# Patient Record
Sex: FEMALE | Race: WHITE | Employment: OTHER | URBAN - METROPOLITAN AREA
[De-identification: names, ages, dates, MRNs, and addresses within clinical notes are randomized per-mention and may not be internally consistent; named-entity substitution may affect disease eponyms.]

---

## 2017-01-03 ENCOUNTER — OFFICE VISIT (OUTPATIENT)
Dept: PODIATRY CLINIC | Facility: CLINIC | Age: 57
End: 2017-01-03

## 2017-01-03 DIAGNOSIS — S90.822A BLISTER OF FOOT, LEFT, INITIAL ENCOUNTER: Primary | ICD-10-CM

## 2017-01-03 PROCEDURE — 99212 OFFICE O/P EST SF 10 MIN: CPT | Performed by: PODIATRIST

## 2017-01-03 NOTE — PROGRESS NOTES
HPI:    Patient ID: Tiffany Mcknight is a 55year old female. HPI  This 28-year-old female presents for follow-up in reference to the blister on the right great toe. Patient states it is very good seems to be fine she is not aware of any draining.   Re the defined types were placed in this encounter.        Meds This Visit:  No prescriptions requested or ordered in this encounter    Imaging & Referrals:  None       XR#[id number]

## 2017-01-17 ENCOUNTER — OFFICE VISIT (OUTPATIENT)
Dept: PODIATRY CLINIC | Facility: CLINIC | Age: 57
End: 2017-01-17

## 2017-01-17 DIAGNOSIS — S90.822A BLISTER OF FOOT, LEFT, INITIAL ENCOUNTER: Primary | ICD-10-CM

## 2017-01-17 PROCEDURE — 99212 OFFICE O/P EST SF 10 MIN: CPT | Performed by: PODIATRIST

## 2017-01-17 RX ORDER — LANCETS
EACH MISCELLANEOUS
Refills: 0 | COMMUNITY
Start: 2016-12-27

## 2017-01-17 NOTE — PROGRESS NOTES
HPI:    Patient ID: Yaquelin Bolivar is a 55year old female. HPI  This 61-year-old female presents for follow-up in reference to the wound on the distal lateral great toe right.   Patient states that she has no more pain and she is not aware that it is

## 2017-01-30 ENCOUNTER — TELEPHONE (OUTPATIENT)
Dept: OPHTHALMOLOGY | Facility: CLINIC | Age: 57
End: 2017-01-30

## 2017-01-30 NOTE — TELEPHONE ENCOUNTER
Pt states her R eye is very red, states it looks bloodshot, requesting to speak to RN. Pls call thank you.

## 2017-01-30 NOTE — TELEPHONE ENCOUNTER
Spoke to pt and complains of a broken blood vessel in her right eye that she noticed today. Pt denies any blurred vision or eye pain. I explained that its a \"subconjunctival hemorrhage and will subside on its own.  I offered pt an office visit this week or

## 2017-03-27 RX ORDER — LISINOPRIL 5 MG/1
TABLET ORAL
Qty: 30 TABLET | Refills: 11 | Status: SHIPPED | OUTPATIENT
Start: 2017-03-27 | End: 2018-05-01

## 2017-04-06 ENCOUNTER — OFFICE VISIT (OUTPATIENT)
Dept: FAMILY MEDICINE CLINIC | Facility: CLINIC | Age: 57
End: 2017-04-06

## 2017-04-06 ENCOUNTER — TELEPHONE (OUTPATIENT)
Dept: FAMILY MEDICINE CLINIC | Facility: CLINIC | Age: 57
End: 2017-04-06

## 2017-04-06 VITALS
TEMPERATURE: 98 F | HEART RATE: 76 BPM | RESPIRATION RATE: 18 BRPM | SYSTOLIC BLOOD PRESSURE: 114 MMHG | DIASTOLIC BLOOD PRESSURE: 76 MMHG | HEIGHT: 67 IN | WEIGHT: 180 LBS | BODY MASS INDEX: 28.25 KG/M2

## 2017-04-06 DIAGNOSIS — R42 VERTIGO: ICD-10-CM

## 2017-04-06 PROCEDURE — 99212 OFFICE O/P EST SF 10 MIN: CPT | Performed by: FAMILY MEDICINE

## 2017-04-06 PROCEDURE — 99213 OFFICE O/P EST LOW 20 MIN: CPT | Performed by: FAMILY MEDICINE

## 2017-04-06 RX ORDER — MECLIZINE HYDROCHLORIDE 25 MG/1
TABLET ORAL
Qty: 30 TABLET | Refills: 0 | Status: SHIPPED | OUTPATIENT
Start: 2017-04-06 | End: 2018-04-09

## 2017-04-06 NOTE — PROGRESS NOTES
HPI:    Patient ID: More Raymundo is a 55year old female. HPI Comments: Pt had an vertigo symptoms for about one month. Pt had a similar episode a year ago and was seen by Dr Delmar Ramsey and given medication and was fine until about one month ago.  No head Psychiatric: She has a normal mood and affect. Her behavior is normal.              ASSESSMENT/PLAN:   Vertigo:  -  After discussion, antivert as directed; Rest and slow movements encouraged.  To monitor symptoms and call if worse or other symptoms; Consi

## 2017-04-06 NOTE — TELEPHONE ENCOUNTER
Pt calling c/o vertigo, declined appt offered states needs something after 5 pm today. Please advise.

## 2017-04-06 NOTE — TELEPHONE ENCOUNTER
Actions Requested: Pt requesting appt. Situation/Background   Problem: Dizziness   Onset: One year ago, didn't f/u with pcp after finishing the  meclizine. Associated Symptoms: Wakes up middle of night turns head and dizziness worsens.  Walking dizziness

## 2017-04-08 ENCOUNTER — OFFICE VISIT (OUTPATIENT)
Dept: OPHTHALMOLOGY | Facility: CLINIC | Age: 57
End: 2017-04-08

## 2017-04-08 DIAGNOSIS — H40.1131 PRIMARY OPEN ANGLE GLAUCOMA OF BOTH EYES, MILD STAGE: Primary | ICD-10-CM

## 2017-04-08 PROCEDURE — 99212 OFFICE O/P EST SF 10 MIN: CPT | Performed by: OPHTHALMOLOGY

## 2017-04-08 PROCEDURE — 99213 OFFICE O/P EST LOW 20 MIN: CPT | Performed by: OPHTHALMOLOGY

## 2017-04-08 NOTE — ASSESSMENT & PLAN NOTE
Discussed with patient Intraocular pressure stable, tolerating medications. Will continue same regimen of Latanoprost in both eyes at bedtime. Will have patient back in 4 months for a visual field, optic nerve scan and pressure check.

## 2017-04-08 NOTE — PROGRESS NOTES
Elvin Crow is a 55year old female. HPI:     HPI     Patient is here for an IOP check. She is taking Latanoprost OU, qhs. Patient states no ocular complaints or changes.        Last edited by Jenniffer Elizondo O.T. on 4/8/2017  8:41 AM.     Mercedes Bedolla Zi/ OREN); Yag Capsulotomy - OS - Left Eye (Left, 1/6/16) (OREN); and Yag Capsulotomy - OD - Right Eye (Right, 6/22/2016) (Dr. Larissa Oleary).     Family History   Problem Relation Age of Onset   • Cancer Father      NG: Esophageal   • Heart Disease Mother no K spindles    Anterior Chamber Deep and quiet Deep and quiet    Iris No transillumination defects No transillumination defects    Lens PC IOL with YAG PC IOL with YAG      Fundus Exam      Right Left    Disc Good rim,  Peripapillary atrophy Good rim,  P

## 2017-04-08 NOTE — PATIENT INSTRUCTIONS
Primary open angle glaucoma of both eyes  Discussed with patient Intraocular pressure stable, tolerating medications. Will continue same regimen of Latanoprost in both eyes at bedtime.   Will have patient back in 4 months for a visual field, optic nerve sc

## 2017-04-20 ENCOUNTER — OFFICE VISIT (OUTPATIENT)
Dept: OTOLARYNGOLOGY | Facility: CLINIC | Age: 57
End: 2017-04-20

## 2017-04-20 ENCOUNTER — OFFICE VISIT (OUTPATIENT)
Dept: AUDIOLOGY | Facility: CLINIC | Age: 57
End: 2017-04-20

## 2017-04-20 VITALS — WEIGHT: 180 LBS | HEIGHT: 66 IN | TEMPERATURE: 99 F | BODY MASS INDEX: 28.93 KG/M2

## 2017-04-20 DIAGNOSIS — R42 DIZZINESS: Primary | ICD-10-CM

## 2017-04-20 DIAGNOSIS — H90.3 SENSORINEURAL HEARING LOSS, BILATERAL: Primary | ICD-10-CM

## 2017-04-20 PROCEDURE — 99212 OFFICE O/P EST SF 10 MIN: CPT | Performed by: OTOLARYNGOLOGY

## 2017-04-20 PROCEDURE — 99243 OFF/OP CNSLTJ NEW/EST LOW 30: CPT | Performed by: OTOLARYNGOLOGY

## 2017-04-20 PROCEDURE — 92550 TYMPANOMETRY & REFLEX THRESH: CPT | Performed by: AUDIOLOGIST

## 2017-04-21 NOTE — PROGRESS NOTES
AUDIOLOGY REPORT      Luis Carson is a 55year old female     Referring Provider: Jerry Calix   YOB: 1960  Medical Record: [id number]      Patient Hearing History:  Patient reported dizziness.      Otoscopic Inspection:  Right ear:

## 2017-04-21 NOTE — PROGRESS NOTES
Hussein Zavala is a 55year old female. Patient presents with:  Dizziness: Patient c/o dizziness has taken medication for it but never resolved      HISTORY OF PRESENT ILLNESS  She presents with a one-year history of dizziness.  This started with sudden presbyopia 2008     NG:  OU   • Episcleritis 12/14/2009     NG:  OD   • Iritis 12/14/2009     NG: OD   • Ganglion 10/22/2013     NG: Right thumb   • Type II or unspecified type diabetes mellitus without mention of complication, not stated as uncontrolled intolerance and heat intolerance. Neuro Negative Tremors. Psych Negative Anxiety and depression. Integumentary Negative Frequent skin infections, pigment change and rash. Hema/Lymph Negative Easy bleeding and easy bruising.            PHYSICAL EXAM Disp: , Rfl: 0  •  latanoprost 0.005 % Ophthalmic Solution, Instill 1 drop by ophthalmic route every night into both eyes, Disp: 1 Bottle, Rfl: 11  •  MetFORMIN HCl  MG Oral Tablet 24 Hr, Take 2 tablets (1,000 mg total) by mouth 2 (two) times daily w

## 2017-05-08 NOTE — TELEPHONE ENCOUNTER
Pt is calling to follow up on refill request, states she has been out for a few days already. Pt was informed LOV: 9/2016, states she will get labs done on Saturday, please advise on refill.

## 2017-05-09 RX ORDER — METFORMIN HYDROCHLORIDE 500 MG/1
TABLET, EXTENDED RELEASE ORAL
Qty: 120 TABLET | Refills: 0 | Status: SHIPPED | OUTPATIENT
Start: 2017-05-09 | End: 2017-06-16

## 2017-05-09 NOTE — TELEPHONE ENCOUNTER
MJS there is a pending order for a1c, do you want to check liver function tests again, AST was 47 in September

## 2017-05-09 NOTE — TELEPHONE ENCOUNTER
[facility] sent, LM on patient's dedicated VM, get fasting labs done this week, make office appt with MJS within 2 weeks

## 2017-05-13 ENCOUNTER — APPOINTMENT (OUTPATIENT)
Dept: LAB | Facility: HOSPITAL | Age: 57
End: 2017-05-13
Attending: FAMILY MEDICINE
Payer: COMMERCIAL

## 2017-05-13 PROCEDURE — 83036 HEMOGLOBIN GLYCOSYLATED A1C: CPT | Performed by: FAMILY MEDICINE

## 2017-05-25 ENCOUNTER — OFFICE VISIT (OUTPATIENT)
Dept: FAMILY MEDICINE CLINIC | Facility: CLINIC | Age: 57
End: 2017-05-25

## 2017-05-25 VITALS
BODY MASS INDEX: 29 KG/M2 | WEIGHT: 177 LBS | SYSTOLIC BLOOD PRESSURE: 144 MMHG | TEMPERATURE: 98 F | DIASTOLIC BLOOD PRESSURE: 78 MMHG | RESPIRATION RATE: 20 BRPM | HEART RATE: 90 BPM

## 2017-05-25 DIAGNOSIS — J06.9 ACUTE URI: Primary | ICD-10-CM

## 2017-05-25 PROCEDURE — 99213 OFFICE O/P EST LOW 20 MIN: CPT | Performed by: FAMILY MEDICINE

## 2017-05-25 PROCEDURE — 99212 OFFICE O/P EST SF 10 MIN: CPT | Performed by: FAMILY MEDICINE

## 2017-05-25 RX ORDER — AZITHROMYCIN 250 MG/1
TABLET, FILM COATED ORAL
Qty: 6 TABLET | Refills: 0 | Status: SHIPPED | OUTPATIENT
Start: 2017-05-25 | End: 2017-07-28 | Stop reason: ALTCHOICE

## 2017-05-25 RX ORDER — CODEINE PHOSPHATE AND GUAIFENESIN 10; 100 MG/5ML; MG/5ML
5 SOLUTION ORAL EVERY 6 HOURS PRN
Qty: 140 ML | Refills: 0 | Status: SHIPPED | OUTPATIENT
Start: 2017-05-25 | End: 2017-07-28 | Stop reason: ALTCHOICE

## 2017-05-25 NOTE — PROGRESS NOTES
HPI:    Patient ID: Hussein Zavala is a 55year old female. HPI Comments: Pt presents with cold symptoms for 4-5 days. Pt has had cough, sore throat. No fevers. Pt has tried otc remedies without relief.  Pt states sick contacts at work      Cough  Thi 0   Glucose Blood (ACCU-CHEK RADHA) In Vitro Strip place 1 by Intradermal route  every day Disp:  Rfl:      Allergies:No Known Allergies   PHYSICAL EXAM:   Physical Exam   Constitutional: She appears well-developed and well-nourished.    HENT:   Right Ear:

## 2017-05-30 ENCOUNTER — OFFICE VISIT (OUTPATIENT)
Dept: FAMILY MEDICINE CLINIC | Facility: CLINIC | Age: 57
End: 2017-05-30

## 2017-05-30 VITALS
BODY MASS INDEX: 28.13 KG/M2 | HEART RATE: 73 BPM | WEIGHT: 175 LBS | TEMPERATURE: 98 F | DIASTOLIC BLOOD PRESSURE: 76 MMHG | SYSTOLIC BLOOD PRESSURE: 127 MMHG | HEIGHT: 66 IN

## 2017-05-30 DIAGNOSIS — J06.9 ACUTE URI: ICD-10-CM

## 2017-05-30 PROCEDURE — 99213 OFFICE O/P EST LOW 20 MIN: CPT | Performed by: FAMILY MEDICINE

## 2017-05-30 PROCEDURE — 99212 OFFICE O/P EST SF 10 MIN: CPT | Performed by: FAMILY MEDICINE

## 2017-05-30 RX ORDER — ALBUTEROL SULFATE 90 UG/1
2 AEROSOL, METERED RESPIRATORY (INHALATION) EVERY 4 HOURS PRN
Qty: 1 INHALER | Refills: 0 | Status: SHIPPED | OUTPATIENT
Start: 2017-05-30 | End: 2018-03-19

## 2017-05-30 NOTE — PROGRESS NOTES
HPI:    Patient ID: Magui Nick is a 55year old female. HPI Comments: Pt presents for follow up for an URI. Pt has had cough, sore throat but now feels better. Pt has had some occasional cough and chest tightness. NO sig wheezing or SOB.  No fever Blood (ACCU-CHEK RADHA) In Vitro Strip place 1 by Intradermal route  every day Disp:  Rfl:      Allergies:No Known Allergies   PHYSICAL EXAM:   Physical Exam   Constitutional: She appears well-developed and well-nourished.    HENT:   Right Ear: Tympanic mem

## 2017-06-19 RX ORDER — METFORMIN HYDROCHLORIDE 500 MG/1
TABLET, EXTENDED RELEASE ORAL
Qty: 120 TABLET | Refills: 0 | Status: SHIPPED | OUTPATIENT
Start: 2017-06-19 | End: 2017-07-21

## 2017-06-19 NOTE — TELEPHONE ENCOUNTER
Pt contacted and she states she will call back to book an appt. Pt was advised that she was only refilled for 30 days, so she will need to be seen within the next 30 days for additional refills, pt verbalized understanding.

## 2017-06-28 ENCOUNTER — PATIENT OUTREACH (OUTPATIENT)
Dept: FAMILY MEDICINE CLINIC | Facility: CLINIC | Age: 57
End: 2017-06-28

## 2017-07-21 ENCOUNTER — PATIENT OUTREACH (OUTPATIENT)
Dept: FAMILY MEDICINE CLINIC | Facility: CLINIC | Age: 57
End: 2017-07-21

## 2017-07-21 DIAGNOSIS — IMO0001 UNCONTROLLED TYPE 2 DIABETES MELLITUS WITHOUT COMPLICATION, WITHOUT LONG-TERM CURRENT USE OF INSULIN: Primary | ICD-10-CM

## 2017-07-21 RX ORDER — METFORMIN HYDROCHLORIDE 500 MG/1
TABLET, EXTENDED RELEASE ORAL
Qty: 120 TABLET | Refills: 0 | Status: CANCELLED | OUTPATIENT
Start: 2017-07-21

## 2017-07-21 RX ORDER — METFORMIN HYDROCHLORIDE 500 MG/1
TABLET, EXTENDED RELEASE ORAL
Qty: 120 TABLET | Refills: 0 | Status: SHIPPED | OUTPATIENT
Start: 2017-07-21 | End: 2017-08-14

## 2017-07-21 NOTE — TELEPHONE ENCOUNTER
Ok to refill? Pt made a follow up for 7/28/17. Failed protocol due to elevated A1C.   Diabetes Medications  Protocol Criteria:  · Appointment scheduled in the past 6 months or the next 3 months  · A1C < 7.5 in the past 6 months  · Creatinine in the past 12

## 2017-07-21 NOTE — TELEPHONE ENCOUNTER
Current Outpatient Prescriptions:  METFORMIN HCL  MG Oral Tablet 24 Hr TAKE TWO TABLETS BY MOUTH TWICE DAILY WITH MEALS Disp: 120 tablet Rfl: 0     Pt made 7/28 appt with DEBBIE-will be out before appt

## 2017-07-21 NOTE — PROGRESS NOTES
Informed pt I would put the lab orders in for  Upcoming visit and she stated understanding she would be fasting. She then said she needs more metformin as she will be out by Monday of this next week and her appt isn't until next Friday the 28th.  She said s

## 2017-07-23 ENCOUNTER — APPOINTMENT (OUTPATIENT)
Dept: LAB | Facility: HOSPITAL | Age: 57
End: 2017-07-23
Attending: FAMILY MEDICINE
Payer: COMMERCIAL

## 2017-07-23 DIAGNOSIS — IMO0001 UNCONTROLLED TYPE 2 DIABETES MELLITUS WITHOUT COMPLICATION, WITHOUT LONG-TERM CURRENT USE OF INSULIN: ICD-10-CM

## 2017-07-23 LAB
ALBUMIN SERPL BCP-MCNC: 3.9 G/DL (ref 3.5–4.8)
ALBUMIN/GLOB SERPL: 1.4 {RATIO} (ref 1–2)
ALP SERPL-CCNC: 96 U/L (ref 32–100)
ALT SERPL-CCNC: 82 U/L (ref 14–54)
ANION GAP SERPL CALC-SCNC: 8 MMOL/L (ref 0–18)
AST SERPL-CCNC: 53 U/L (ref 15–41)
BILIRUB SERPL-MCNC: 0.7 MG/DL (ref 0.3–1.2)
BUN SERPL-MCNC: 19 MG/DL (ref 8–20)
BUN/CREAT SERPL: 32.2 (ref 10–20)
CALCIUM SERPL-MCNC: 9.1 MG/DL (ref 8.5–10.5)
CHLORIDE SERPL-SCNC: 102 MMOL/L (ref 95–110)
CHOLEST SERPL-MCNC: 143 MG/DL (ref 110–200)
CO2 SERPL-SCNC: 27 MMOL/L (ref 22–32)
CREAT SERPL-MCNC: 0.59 MG/DL (ref 0.5–1.5)
CREAT UR-MCNC: 144.3 MG/DL
GLOBULIN PLAS-MCNC: 2.8 G/DL (ref 2.5–3.7)
GLUCOSE SERPL-MCNC: 287 MG/DL (ref 70–99)
HBA1C MFR BLD: 12.3 % (ref 4–6)
HDLC SERPL-MCNC: 42 MG/DL
LDLC SERPL CALC-MCNC: 83 MG/DL (ref 0–99)
MICROALBUMIN UR-MCNC: 1.1 MG/DL (ref 0–1.8)
MICROALBUMIN/CREAT UR: 7.6 MG/G{CREAT} (ref 0–20)
NONHDLC SERPL-MCNC: 101 MG/DL
OSMOLALITY UR CALC.SUM OF ELEC: 297 MOSM/KG (ref 275–295)
POTASSIUM SERPL-SCNC: 3.9 MMOL/L (ref 3.3–5.1)
PROT SERPL-MCNC: 6.7 G/DL (ref 5.9–8.4)
SODIUM SERPL-SCNC: 137 MMOL/L (ref 136–144)
TRIGL SERPL-MCNC: 92 MG/DL (ref 1–149)
TSH SERPL-ACNC: 1.69 UIU/ML (ref 0.45–5.33)

## 2017-07-23 PROCEDURE — 36415 COLL VENOUS BLD VENIPUNCTURE: CPT

## 2017-07-23 PROCEDURE — 84443 ASSAY THYROID STIM HORMONE: CPT

## 2017-07-23 PROCEDURE — 82043 UR ALBUMIN QUANTITATIVE: CPT

## 2017-07-23 PROCEDURE — 80053 COMPREHEN METABOLIC PANEL: CPT

## 2017-07-23 PROCEDURE — 80061 LIPID PANEL: CPT

## 2017-07-23 PROCEDURE — 82570 ASSAY OF URINE CREATININE: CPT

## 2017-07-23 PROCEDURE — 83036 HEMOGLOBIN GLYCOSYLATED A1C: CPT

## 2017-07-28 ENCOUNTER — OFFICE VISIT (OUTPATIENT)
Dept: FAMILY MEDICINE CLINIC | Facility: CLINIC | Age: 57
End: 2017-07-28

## 2017-07-28 VITALS
HEART RATE: 69 BPM | BODY MASS INDEX: 28 KG/M2 | WEIGHT: 172 LBS | SYSTOLIC BLOOD PRESSURE: 114 MMHG | DIASTOLIC BLOOD PRESSURE: 68 MMHG

## 2017-07-28 DIAGNOSIS — IMO0001 UNCONTROLLED TYPE 2 DIABETES MELLITUS WITHOUT COMPLICATION, WITHOUT LONG-TERM CURRENT USE OF INSULIN: ICD-10-CM

## 2017-07-28 DIAGNOSIS — E11.41 TYPE 2 DIABETES MELLITUS WITH DIABETIC MONONEUROPATHY, WITHOUT LONG-TERM CURRENT USE OF INSULIN (HCC): Primary | ICD-10-CM

## 2017-07-28 PROCEDURE — 99212 OFFICE O/P EST SF 10 MIN: CPT | Performed by: FAMILY MEDICINE

## 2017-07-28 PROCEDURE — 99214 OFFICE O/P EST MOD 30 MIN: CPT | Performed by: FAMILY MEDICINE

## 2017-07-28 NOTE — PROGRESS NOTES
HPI:    Patient ID: Jakob Talamantes is a 55year old female. HPI  Patient presents with:  Diabetes: f/u medication  Labs done. Very elevated Hga1c    Review of Systems   Constitutional: Negative. Eyes: Negative. Respiratory: Negative.     Cardiov long-term current use of insulin (hcc)  (primary encounter diagnosis)    Long discussion regarding adding medication. She stopped taking pioglitasone that was added last visit. Will add tradjenta pending cost eval and recheck labs in three months.  Has eye

## 2017-07-29 ENCOUNTER — TELEPHONE (OUTPATIENT)
Dept: FAMILY MEDICINE CLINIC | Facility: CLINIC | Age: 57
End: 2017-07-29

## 2017-07-29 NOTE — TELEPHONE ENCOUNTER
Pt was prescribed Linagliptin (TRADJENTA) 5 MG Oral Tab and it is very expensive. Do we happen to have any coupons for this medication?

## 2017-07-31 NOTE — TELEPHONE ENCOUNTER
Dr. Junior Coffey stated that the medication is to expensive but, she will pick it up from the Pharmacy this time. Per pt will need a written for Tradjenta 5mg for 90 day supply per pt states she can get it cheap [place]

## 2017-08-07 NOTE — TELEPHONE ENCOUNTER
Wait until first month to tell if she tolerates med or not. I can also change med to alternative to see if lower expense med available.

## 2017-08-09 ENCOUNTER — TELEPHONE (OUTPATIENT)
Dept: FAMILY MEDICINE CLINIC | Facility: CLINIC | Age: 57
End: 2017-08-09

## 2017-08-09 NOTE — TELEPHONE ENCOUNTER
Pt calling regarding medication Linagliptin (TRADJENTA) 5 MG Oral Tab. Pt would like to know what time in the day should she take this medication. .. please advise

## 2017-08-15 RX ORDER — METFORMIN HYDROCHLORIDE 500 MG/1
TABLET, EXTENDED RELEASE ORAL
Qty: 120 TABLET | Refills: 0 | Status: SHIPPED | OUTPATIENT
Start: 2017-08-15 | End: 2017-09-20

## 2017-08-26 ENCOUNTER — OFFICE VISIT (OUTPATIENT)
Dept: OPHTHALMOLOGY | Facility: CLINIC | Age: 57
End: 2017-08-26

## 2017-08-26 DIAGNOSIS — H40.1131 PRIMARY OPEN ANGLE GLAUCOMA OF BOTH EYES, MILD STAGE: ICD-10-CM

## 2017-08-26 PROCEDURE — 92083 EXTENDED VISUAL FIELD XM: CPT | Performed by: OPHTHALMOLOGY

## 2017-08-26 PROCEDURE — 92133 CPTRZD OPH DX IMG PST SGM ON: CPT | Performed by: OPHTHALMOLOGY

## 2017-08-29 NOTE — PROGRESS NOTES
Tomás Ball is a 55year old female. HPI:     HPI     Patient is here for a glaucoma work up with HVF and OCT, no M.FAN.     Last edited by Stephen Vilchis on 8/26/2017  9:57 AM. (History)        Patient History:  Past Medical History:   Diagnosis Yossi (OREN); and Yag Capsulotomy - OD - Right Eye (Right, 6/22/2016) (Dr. Paige Flores).     Family History   Problem Relation Age of Onset   • Cancer Father      NG: Esophageal   • Heart Disease Mother      NG:  CAD   • Cancer Mother    • Diabetes Mother    • Ovaria HS.      No orders of the defined types were placed in this encounter. Meds This Visit:    No prescriptions requested or ordered in this encounter     Follow up instructions:  Return in about 3 weeks (around 9/16/2017) for IOP check.     8/28/2017  Scr

## 2017-09-16 ENCOUNTER — OFFICE VISIT (OUTPATIENT)
Dept: OPHTHALMOLOGY | Facility: CLINIC | Age: 57
End: 2017-09-16

## 2017-09-16 DIAGNOSIS — H40.1131 PRIMARY OPEN ANGLE GLAUCOMA OF BOTH EYES, MILD STAGE: Primary | ICD-10-CM

## 2017-09-16 PROCEDURE — 99212 OFFICE O/P EST SF 10 MIN: CPT | Performed by: OPHTHALMOLOGY

## 2017-09-16 PROCEDURE — 99213 OFFICE O/P EST LOW 20 MIN: CPT | Performed by: OPHTHALMOLOGY

## 2017-09-16 NOTE — PATIENT INSTRUCTIONS
Primary open angle glaucoma of both eyes, mild stage  Discussed with patient Intraocular pressure stable, tolerating medications. Will continue same regimen of Latanoprost in both eyes at bedtime.  Will have patient back in 4 months for a dilated eye exam.

## 2017-09-16 NOTE — ASSESSMENT & PLAN NOTE
Discussed with patient Intraocular pressure stable, tolerating medications. Will continue same regimen of Latanoprost in both eyes at bedtime.  Will have patient back in 4 months for a dilated eye exam.

## 2017-09-16 NOTE — PROGRESS NOTES
Zhou Elias is a 55year old female. HPI:     HPI     Pt has been taking Latanoprost OU qhs as directed. Pt states that her vision is stable and has no ocular complaints.      Last edited by Lio Ahmadi O.T. on 9/16/2017  9:05 AM. (History) Zi/ OREN); Yag Capsulotomy - OS - Left Eye (Left, 1/6/16) (OREN); and Yag Capsulotomy - OD - Right Eye (Right, 6/22/2016) (Dr. Genevieve Delgado).     Family History   Problem Relation Age of Onset   • Cancer Father      NG: Esophageal   • Heart Disease Mother Tonometry (Applanation, 9:05 AM)       Right Left    Pressure 19 18          Pachymetry (10/20/2008)       Right Left    Thickness 567/-1 574/-2          Pupils       Pupils    Right PERRL    Left PERRL            Slit Lamp and Fundus Exam     Slit L

## 2017-09-18 ENCOUNTER — TELEPHONE (OUTPATIENT)
Dept: FAMILY MEDICINE CLINIC | Facility: CLINIC | Age: 57
End: 2017-09-18

## 2017-09-18 NOTE — TELEPHONE ENCOUNTER
Pt is calling state that it is not time for a refill on medication Linagliptin (TRADJENTA) 5 MG Oral Tab  Pt state that she found a pharm in [facility] that is cheaper   Pt want to know what is it that she need to do to get prescription sent to that pharm pt i

## 2017-09-18 NOTE — TELEPHONE ENCOUNTER
Pt called back, and informed we cannot directly send a script to a [address] (contrary to what [employer] below documented). Informed pt, if she would like, she can get a printed script from us and handle that herself. Pt agrees.      Dr Najma Robbins, pt asking

## 2017-09-18 NOTE — TELEPHONE ENCOUNTER
LM on patient's dedicated voice mail, we will ask Dr Maritza Rust if he has any reason for the patient to not use the [address] to call us back with the pharmacies name, address and phone #

## 2017-09-19 NOTE — TELEPHONE ENCOUNTER
Patient called to inquire if her RX was a 90 day supply. I reviewed rx approved for 90 day. Patient will  RX today.

## 2017-09-22 NOTE — TELEPHONE ENCOUNTER
Diabetes Medications. PROTOCOL FAILED. PLEASE ADVISE ON REFILL. THANKS.     Protocol Criteria:  · Appointment scheduled in the past 6 months or the next 3 months  · A1C < 7.5 in the past 6 months  · Creatinine in the past 12 months  · Creatinine result < 1.

## 2017-09-23 RX ORDER — METFORMIN HYDROCHLORIDE 500 MG/1
TABLET, EXTENDED RELEASE ORAL
Qty: 120 TABLET | Refills: 0 | Status: SHIPPED | OUTPATIENT
Start: 2017-09-23 | End: 2017-10-23

## 2017-09-23 NOTE — TELEPHONE ENCOUNTER
Routed to S and lou (LBB) as note below states pt is now out of medication. Most recent A1C-12.3 (07/2017).

## 2017-09-25 ENCOUNTER — TELEPHONE (OUTPATIENT)
Dept: OTHER | Age: 57
End: 2017-09-25

## 2017-09-25 NOTE — TELEPHONE ENCOUNTER
Spoke with pt and verified pt . Pt states she was just started on Tradjenta and wanted to take OTC pseudoephedrine for a cold. Pt wants to make sure there is no interaction between the two meds.     Advised pt to check with pharmacist, and pt agrees,

## 2017-09-28 ENCOUNTER — TELEPHONE (OUTPATIENT)
Dept: OTHER | Age: 57
End: 2017-09-28

## 2017-09-28 NOTE — TELEPHONE ENCOUNTER
Pt had a previously scheduled appt for a rash for Monday 10/2. Pt would like to move appt to Saturday, offered appt for tonight or tomorrow, she declines, is unavailable for any sooner appt. Scheduled for Saturday with Dr. Yeni Mendoza.  Pt has no other questions

## 2017-10-04 ENCOUNTER — NURSE TRIAGE (OUTPATIENT)
Dept: OTHER | Age: 57
End: 2017-10-04

## 2017-10-04 NOTE — TELEPHONE ENCOUNTER
Action Requested: Summary for Provider     []  Critical Lab, Recommendations Needed  [x] Need Additional Advice  []   FYI    []   Need Orders  [] Need Medications Sent to Pharmacy  []  Other     SUMMARY: Pt stts has itchy blistery rash on both legs x 2 wee

## 2017-10-05 NOTE — TELEPHONE ENCOUNTER
Left message re: MJS message below and to call back if symptoms do not improve. Preferred contact numbers:   (1).  Primary: [phone] (Cell) -- Leave Msg: YES

## 2017-10-06 ENCOUNTER — TELEPHONE (OUTPATIENT)
Dept: FAMILY MEDICINE CLINIC | Facility: CLINIC | Age: 57
End: 2017-10-06

## 2017-10-06 NOTE — TELEPHONE ENCOUNTER
Pt stts she sent RX to [address] [place] [address] due to price. Pt stts script has not been shipped. Pt asking if she is okay if she misses an couple days of medication? Linagliptin (TRADJENTA) 5 MG Oral Tab Take 5 mg by mouth daily.  Disp: 90 tablet Rfl: 0

## 2017-10-25 RX ORDER — METFORMIN HYDROCHLORIDE 500 MG/1
TABLET, EXTENDED RELEASE ORAL
Qty: 120 TABLET | Refills: 0 | Status: SHIPPED | OUTPATIENT
Start: 2017-10-25 | End: 2017-11-24

## 2017-10-25 NOTE — TELEPHONE ENCOUNTER
[facility] [phone] is calling for status of medication refill request. Per [facility] pt is out of medication.

## 2017-11-24 RX ORDER — METFORMIN HYDROCHLORIDE 500 MG/1
TABLET, EXTENDED RELEASE ORAL
Qty: 120 TABLET | Refills: 0 | Status: SHIPPED | OUTPATIENT
Start: 2017-11-24 | End: 2017-12-28

## 2017-12-27 NOTE — TELEPHONE ENCOUNTER
Pt is requesting a refill on medication below and states need to fax a new prescription to [phone] to [address] in [facility]). Current Outpatient Prescriptions:   •  Linagliptin (TRADJENTA) 5 MG Oral Tab, Take 5 mg by mouth daily. , Disp: 90 tablet, R

## 2017-12-30 RX ORDER — METFORMIN HYDROCHLORIDE 500 MG/1
TABLET, EXTENDED RELEASE ORAL
Qty: 120 TABLET | Refills: 0 | Status: SHIPPED | OUTPATIENT
Start: 2017-12-30 | End: 2018-02-05

## 2018-01-02 RX ORDER — LATANOPROST 50 UG/ML
SOLUTION/ DROPS OPHTHALMIC
Qty: 1 BOTTLE | Refills: 11 | Status: SHIPPED | OUTPATIENT
Start: 2018-01-02 | End: 2019-01-14

## 2018-01-02 NOTE — TELEPHONE ENCOUNTER
LR 9/18/17    LOV 7/28/17    Rx pended as script printed    Patient requests med to be sent to [facility] in [place]--unable to send outside of [place]--repeat HgbA1C also not completed    Please advise    Please respond to pool: ESTUARDO Rudolph 63 LPN/CMA    Offic

## 2018-01-02 NOTE — TELEPHONE ENCOUNTER
Pt has an upcoming apt on 1/13/18 with [facility]. Rx sent to [facility] to sign off on.

## 2018-01-03 ENCOUNTER — TELEPHONE (OUTPATIENT)
Dept: FAMILY MEDICINE CLINIC | Facility: CLINIC | Age: 58
End: 2018-01-03

## 2018-01-03 NOTE — TELEPHONE ENCOUNTER
Pt is requesting refill. Pt stts script needs to go to [facility] ( [address] [facility] [address] )     Linagliptin (TRADJENTA) 5 MG Oral Tab Take 5 mg by mouth daily.  Disp: 90 tablet Rfl: 0

## 2018-01-04 NOTE — TELEPHONE ENCOUNTER
Pt is calling state that she want to send the prescription for Tradjent  to [place] ([facility])   Pt state that she must  the scription  Also want to know if refill can be add  Pt is requesting a call back

## 2018-01-05 NOTE — TELEPHONE ENCOUNTER
Under medications tab it states script printed - can [address] please call pt to let her know it's ready for ? Unable to confirm, staff is on lunch.

## 2018-01-05 NOTE — TELEPHONE ENCOUNTER
Patient also wants to know if MJS can add refills to this prescription.     Conversation: Refill Request   (Newest Message First)   January 5, 2018     12:43 PM   Juan Daniel Montgomery routed this conversation to Em  Jackie Lpn/Anna Montgomery       12:43 PM

## 2018-01-06 NOTE — TELEPHONE ENCOUNTER
Patient called requesting to  script for     Linagliptin (TRADJENTA) 5 MG Oral Tab Take 5 mg by mouth daily. Disp: 90 tablet Rfl: 0     At the [facility] location 01/08/2018. Patient needs script printed so can send to [facility]) to have refilled.

## 2018-02-06 RX ORDER — METFORMIN HYDROCHLORIDE 500 MG/1
TABLET, EXTENDED RELEASE ORAL
Qty: 120 TABLET | Refills: 0 | Status: SHIPPED | OUTPATIENT
Start: 2018-02-06 | End: 2018-03-12

## 2018-02-06 NOTE — TELEPHONE ENCOUNTER
Refill protocol failed because the patient did not meet the protocol criteria.  Please advise in regards to refill request     Diabetes Medications  Protocol Criteria:  · Appointment scheduled in the past 6 months or the next 3 months  · A1C < 7.5 in the pa

## 2018-03-13 ENCOUNTER — OFFICE VISIT (OUTPATIENT)
Dept: FAMILY MEDICINE CLINIC | Facility: CLINIC | Age: 58
End: 2018-03-13

## 2018-03-13 ENCOUNTER — NURSE TRIAGE (OUTPATIENT)
Dept: OTHER | Age: 58
End: 2018-03-13

## 2018-03-13 VITALS
SYSTOLIC BLOOD PRESSURE: 135 MMHG | BODY MASS INDEX: 28 KG/M2 | WEIGHT: 172 LBS | TEMPERATURE: 99 F | DIASTOLIC BLOOD PRESSURE: 65 MMHG | HEART RATE: 72 BPM

## 2018-03-13 DIAGNOSIS — J06.9 UPPER RESPIRATORY INFECTION, ACUTE: Primary | ICD-10-CM

## 2018-03-13 PROCEDURE — 99212 OFFICE O/P EST SF 10 MIN: CPT | Performed by: FAMILY MEDICINE

## 2018-03-13 PROCEDURE — 99213 OFFICE O/P EST LOW 20 MIN: CPT | Performed by: FAMILY MEDICINE

## 2018-03-13 RX ORDER — AZITHROMYCIN 250 MG/1
TABLET, FILM COATED ORAL
Qty: 6 TABLET | Refills: 0 | Status: SHIPPED | OUTPATIENT
Start: 2018-03-13 | End: 2018-03-19 | Stop reason: ALTCHOICE

## 2018-03-13 NOTE — TELEPHONE ENCOUNTER
Action Requested: Summary for Provider     []  Critical Lab, Recommendations Needed  [] Need Additional Advice  []   FYI    []   Need Orders  [] Need Medications Sent to Pharmacy  []  Other     SUMMARY: Appointment with Dr. Malissa Dennis today 3/13/18 6:15 pm.

## 2018-03-14 RX ORDER — METFORMIN HYDROCHLORIDE 500 MG/1
TABLET, EXTENDED RELEASE ORAL
Qty: 120 TABLET | Refills: 0 | Status: SHIPPED | OUTPATIENT
Start: 2018-03-14 | End: 2018-04-16

## 2018-03-14 NOTE — PROGRESS NOTES
HPI:    Patient ID: Magui Nick is a 65year old female. Cough for several days, thick sputum, nte getting better. No bodyaches, but feels very tired  HPI    Review of Systems   Constitutional: Positive for fatigue.  Negative for activity change, edward respiratory distress. Prolonged exp phase              ASSESSMENT/PLAN:   (J06.9) Upper respiratory infection, acute  (primary encounter diagnosis)  Plan:startt z pack      No orders of the defined types were placed in this encounter.       Meds This Visi

## 2018-03-14 NOTE — TELEPHONE ENCOUNTER
Patient was recently seen yesterday for acute URI visit. Dr. Ria Conn:  Please advise if ok to send refill? Patient due for Hbga1c. I left detailed message to remind patient to have this done and schedule DM f/u visit.

## 2018-03-16 ENCOUNTER — TELEPHONE (OUTPATIENT)
Dept: FAMILY MEDICINE CLINIC | Facility: CLINIC | Age: 58
End: 2018-03-16

## 2018-03-16 NOTE — TELEPHONE ENCOUNTER
Pt is in the middle of zpack course. She was actively coughing while on the phone with me. She asked if she could visit with her new grandchild.   I advised that she should not have contact with the grandchild while in the middle of the antibiotics and wh

## 2018-03-19 ENCOUNTER — OFFICE VISIT (OUTPATIENT)
Dept: FAMILY MEDICINE CLINIC | Facility: CLINIC | Age: 58
End: 2018-03-19

## 2018-03-19 ENCOUNTER — HOSPITAL ENCOUNTER (OUTPATIENT)
Dept: GENERAL RADIOLOGY | Facility: HOSPITAL | Age: 58
Discharge: HOME | End: 2018-03-19
Attending: PHYSICIAN ASSISTANT
Payer: COMMERCIAL

## 2018-03-19 VITALS
BODY MASS INDEX: 27.8 KG/M2 | DIASTOLIC BLOOD PRESSURE: 86 MMHG | WEIGHT: 173 LBS | HEIGHT: 66 IN | HEART RATE: 84 BPM | TEMPERATURE: 98 F | SYSTOLIC BLOOD PRESSURE: 149 MMHG

## 2018-03-19 DIAGNOSIS — J06.9 ACUTE UPPER RESPIRATORY INFECTION: ICD-10-CM

## 2018-03-19 DIAGNOSIS — J06.9 ACUTE UPPER RESPIRATORY INFECTION: Primary | ICD-10-CM

## 2018-03-19 PROCEDURE — 71046 X-RAY EXAM CHEST 2 VIEWS: CPT | Performed by: PHYSICIAN ASSISTANT

## 2018-03-19 PROCEDURE — 99212 OFFICE O/P EST SF 10 MIN: CPT | Performed by: PHYSICIAN ASSISTANT

## 2018-03-19 PROCEDURE — 99213 OFFICE O/P EST LOW 20 MIN: CPT | Performed by: PHYSICIAN ASSISTANT

## 2018-03-19 RX ORDER — BENZONATATE 100 MG/1
100 CAPSULE ORAL 3 TIMES DAILY PRN
Qty: 30 CAPSULE | Refills: 0 | Status: SHIPPED | OUTPATIENT
Start: 2018-03-19 | End: 2018-07-18

## 2018-03-19 RX ORDER — ALBUTEROL SULFATE 90 UG/1
2 AEROSOL, METERED RESPIRATORY (INHALATION) EVERY 4 HOURS PRN
Qty: 1 INHALER | Refills: 0 | Status: SHIPPED | OUTPATIENT
Start: 2018-03-19 | End: 2018-12-11 | Stop reason: ALTCHOICE

## 2018-03-19 NOTE — PROGRESS NOTES
HPI:    Patient ID: Baudilio Chun is a 65year old female. Patient presents for cough for past one week. She denies fever or chills. She has some associated congestion. She denies sinus pain, sore throat, dizziness.   She has some shortness of hina Oral Tab TAKE ONE TABLET BY MOUTH ONCE DAILY Disp: 30 tablet Rfl: 11   ACCU-CHEK SOFTCLIX LANCETS Does not apply Misc  Disp:  Rfl: 0   Blood Glucose Monitoring Suppl (ACCU-CHEK RADHA PLUS) W/DEVICE Does not apply Kit To use as directed.  Disp: 1 kit Rfl: 0 Albuterol Sulfate  (90 Base) MCG/ACT Inhalation Aero Soln 1 Inhaler 0      Sig: Inhale 2 puffs into the lungs every 4 (four) hours as needed for Wheezing or Shortness of Breath.       benzonatate (TESSALON PERLES) 100 MG Oral Cap 30 capsule 0      Si

## 2018-03-22 ENCOUNTER — OFFICE VISIT (OUTPATIENT)
Dept: OPHTHALMOLOGY | Facility: CLINIC | Age: 58
End: 2018-03-22

## 2018-03-22 DIAGNOSIS — Z96.1 PSEUDOPHAKIA OF BOTH EYES: ICD-10-CM

## 2018-03-22 DIAGNOSIS — H40.1131 PRIMARY OPEN ANGLE GLAUCOMA OF BOTH EYES, MILD STAGE: Primary | ICD-10-CM

## 2018-03-22 DIAGNOSIS — E11.9 DIABETES MELLITUS TYPE 2 WITHOUT RETINOPATHY (HCC): ICD-10-CM

## 2018-03-22 PROCEDURE — 92014 COMPRE OPH EXAM EST PT 1/>: CPT | Performed by: OPHTHALMOLOGY

## 2018-03-22 NOTE — ASSESSMENT & PLAN NOTE
Diabetes type II: no background of retinopathy, no signs of neovascularization noted. Discussed ocular and systemic benefits of blood sugar control. Diagnosis and treatment discussed in detail with patient.     To consider referral to endocrinologist to i

## 2018-03-22 NOTE — PROGRESS NOTES
Glendy Godinez is a 65year old female.     HPI:     HPI     Diabetic Eye Exam    Additional comments: Pt has been a diabetic for 10 years  10 years on pills/ 0 years on Insulin   Pt does not check her BS   Pt's last blood sugar was  287 on 7/23/17  Last 10/22/2013 - Arlen Rodney ); Cataract extraction w/  intraocular lens implant (Right, 4/21/14) ( [place] w/ PC IOL/general/vision blue/ RJM);  Cataract extraction w/  intraocular lens implant (Left, 6/23/14) (Phaco with PC IOL/ general/ Vision Blue/ RJM (ACCU-CHEK RADHA PLUS) W/DEVICE Does not apply Kit To use as directed.  Disp: 1 kit Rfl: 0   Glucose Blood (ACCU-CHEK RADHA) In Vitro Strip place 1 by Intradermal route  every day Disp:  Rfl:        Allergies:  No Known Allergies    ROS:       PHYSICAL EXAM tolerating medications. Will continue same regimen of Latanoprost in both eyes at bedtime. Will have patient back in 4 months for a visual field, OCT and pressure check.      Diabetes mellitus type 2 without retinopathy ([address]  Diabetes type II: no backgrou

## 2018-03-22 NOTE — ASSESSMENT & PLAN NOTE
Discussed with patient Intraocular pressure stable, tolerating medications. Will continue same regimen of Latanoprost in both eyes at bedtime. Will have patient back in 4 months for a visual field, OCT and pressure check.

## 2018-03-22 NOTE — PATIENT INSTRUCTIONS
Primary open angle glaucoma of both eyes, mild stage  Discussed with patient Intraocular pressure stable, tolerating medications. Will continue same regimen of Latanoprost in both eyes at bedtime.  Will have patient back in 4 months for a visual field, OCT

## 2018-04-09 ENCOUNTER — HOSPITAL ENCOUNTER (OUTPATIENT)
Age: 58
Discharge: HOME | End: 2018-04-09
Attending: EMERGENCY MEDICINE
Payer: COMMERCIAL

## 2018-04-09 ENCOUNTER — NURSE TRIAGE (OUTPATIENT)
Dept: OTHER | Age: 58
End: 2018-04-09

## 2018-04-09 ENCOUNTER — APPOINTMENT (OUTPATIENT)
Dept: GENERAL RADIOLOGY | Age: 58
End: 2018-04-09
Attending: EMERGENCY MEDICINE
Payer: COMMERCIAL

## 2018-04-09 VITALS
OXYGEN SATURATION: 97 % | SYSTOLIC BLOOD PRESSURE: 133 MMHG | HEART RATE: 78 BPM | RESPIRATION RATE: 16 BRPM | BODY MASS INDEX: 28 KG/M2 | DIASTOLIC BLOOD PRESSURE: 75 MMHG | WEIGHT: 173 LBS | TEMPERATURE: 101 F

## 2018-04-09 DIAGNOSIS — J18.9 PNEUMONIA OF RIGHT LOWER LOBE DUE TO INFECTIOUS ORGANISM: Primary | ICD-10-CM

## 2018-04-09 PROCEDURE — 82962 GLUCOSE BLOOD TEST: CPT

## 2018-04-09 PROCEDURE — 99204 OFFICE O/P NEW MOD 45 MIN: CPT

## 2018-04-09 PROCEDURE — 71046 X-RAY EXAM CHEST 2 VIEWS: CPT | Performed by: EMERGENCY MEDICINE

## 2018-04-09 PROCEDURE — 99213 OFFICE O/P EST LOW 20 MIN: CPT

## 2018-04-09 RX ORDER — AZITHROMYCIN 250 MG/1
TABLET, FILM COATED ORAL
Qty: 1 PACKAGE | Refills: 0 | Status: SHIPPED | OUTPATIENT
Start: 2018-04-09 | End: 2018-07-18

## 2018-04-09 RX ORDER — ALBUTEROL SULFATE 90 UG/1
2 AEROSOL, METERED RESPIRATORY (INHALATION) EVERY 4 HOURS PRN
Qty: 1 INHALER | Refills: 0 | Status: SHIPPED | OUTPATIENT
Start: 2018-04-09 | End: 2018-05-09

## 2018-04-09 NOTE — ED PROVIDER NOTES
Patient Seen in: [facility] In [address] [place] [address]    History   Patient presents with:  Cough/URI    Stated Complaint: chill,cough    HPI    Patient is a 15-year-old female that complains of 2 days of fever and cough and congestion.   There is with PC IOL/ general/ Vision Blue/ RJM  No date: OTHER SURGICAL HISTORY      Comment: NG: Bunion/hammer toes surgery   No date: OTHER SURGICAL HISTORY      Comment: NG:  Excision ganglion, right thumb,                10/22/2013 - [facility]   6/22/ Musculoskeletal: Normal range of motion. Exhibits no edema or tenderness. Lymphadenopathy: No cervical adenopathy. Neurological: Alert and oriented to person, place, and time. Normal reflexes. No cranial nerve deficit.  No motor os sensory defecits no

## 2018-04-09 NOTE — TELEPHONE ENCOUNTER
Action Requested: Summary for Provider     []  Critical Lab, Recommendations Needed  [] Need Additional Advice  []   FYI    []   Need Orders  [] Need Medications Sent to Pharmacy  []  Other     SUMMARY: Marjan Christianson pt stated that she was seen by you on March

## 2018-04-10 NOTE — TELEPHONE ENCOUNTER
Patient taking z-pack as ordered, using inhaler. Follow-up appt scheduled for Friday 4/13/18 9:30 am with Dr King Elkins. Advised patient on 8 glasses water/day, hot tea/fluids, broth, small frequent snacks, use of a vaporizer/humidifier; she agreed.  Advised i

## 2018-04-11 NOTE — TELEPHONE ENCOUNTER
Patient stated a little better today. Reviewed the care advice from yesterday. Confirmed 4/13/18 appt. Advised to call back if needed, if worse; she agreed.

## 2018-04-13 ENCOUNTER — OFFICE VISIT (OUTPATIENT)
Dept: FAMILY MEDICINE CLINIC | Facility: CLINIC | Age: 58
End: 2018-04-13

## 2018-04-13 VITALS
WEIGHT: 169 LBS | SYSTOLIC BLOOD PRESSURE: 118 MMHG | BODY MASS INDEX: 27 KG/M2 | HEART RATE: 63 BPM | DIASTOLIC BLOOD PRESSURE: 75 MMHG | TEMPERATURE: 98 F | RESPIRATION RATE: 98 BRPM

## 2018-04-13 DIAGNOSIS — J06.9 ACUTE UPPER RESPIRATORY INFECTION: Primary | ICD-10-CM

## 2018-04-13 PROCEDURE — 99212 OFFICE O/P EST SF 10 MIN: CPT | Performed by: FAMILY MEDICINE

## 2018-04-13 PROCEDURE — 99214 OFFICE O/P EST MOD 30 MIN: CPT | Performed by: FAMILY MEDICINE

## 2018-04-13 RX ORDER — PREDNISONE 10 MG/1
TABLET ORAL
Qty: 15 TABLET | Refills: 0 | Status: SHIPPED | OUTPATIENT
Start: 2018-04-13 | End: 2018-07-18

## 2018-04-13 NOTE — PROGRESS NOTES
HPI:    Patient ID: Nehal Malloy is a 65year old female. HPI  Patient presents with:  Pneumonia: follow up for pneumonia, mammo referral   reviewed CXR  Review of Systems   Constitutional: Negative. HENT: Negative.     Respiratory: Positive for well-developed. HENT:   Head: Normocephalic. Right Ear: A middle ear effusion is present. Left Ear: A middle ear effusion is present. Nose: Nose normal.   Cardiovascular: Normal heart sounds.     Pulmonary/Chest:   Coarse bs bilateral              A

## 2018-04-16 NOTE — TELEPHONE ENCOUNTER
Per pt, she needs refill on her :  Linagliptin (TRADJENTA  Pls send rx med to pt so that she can send to [address] [place] [address].       Current Outpatient Prescriptions:                                      Linagliptin (TRADJENTA) 5 MG Oral Tab Take 5 mg by mouth d

## 2018-04-17 NOTE — TELEPHONE ENCOUNTER
Please advise. Patient needs paper script to be sent to [facility] ([facility]).           Diabetes Medications  Protocol Criteria:  · Appointment scheduled in the past 6 months or the next 3 months  · A1C < 7.5 in the past 6 months  · Creatinine in the past 12 months  ·

## 2018-04-18 RX ORDER — METFORMIN HYDROCHLORIDE 500 MG/1
TABLET, EXTENDED RELEASE ORAL
Qty: 120 TABLET | Refills: 0 | Status: SHIPPED | OUTPATIENT
Start: 2018-04-18 | End: 2018-05-24

## 2018-04-18 NOTE — TELEPHONE ENCOUNTER
Refill protocol failed due to abnormal A1C results. Pt was instructed by Dr Junior Coffey at [facility] 7/28/18 to recheck hemoglobin A1c and pt did not f/u.   Spoke with pt who states she just had OV with Dr Junior Coffey and he told her she didn't need to get it recheck

## 2018-04-20 ENCOUNTER — OFFICE VISIT (OUTPATIENT)
Dept: FAMILY MEDICINE CLINIC | Facility: CLINIC | Age: 58
End: 2018-04-20

## 2018-04-20 VITALS
TEMPERATURE: 98 F | DIASTOLIC BLOOD PRESSURE: 71 MMHG | HEART RATE: 78 BPM | BODY MASS INDEX: 27 KG/M2 | WEIGHT: 169 LBS | SYSTOLIC BLOOD PRESSURE: 126 MMHG

## 2018-04-20 DIAGNOSIS — J18.9 COMMUNITY ACQUIRED PNEUMONIA, UNSPECIFIED LATERALITY: Primary | ICD-10-CM

## 2018-04-20 PROCEDURE — 99212 OFFICE O/P EST SF 10 MIN: CPT | Performed by: FAMILY MEDICINE

## 2018-04-20 NOTE — PROGRESS NOTES
HPI:    Patient ID: Mansoor Lara is a 65year old female. HPI  Patient presents with:  Forms Completion: FMLA forms to complete for being sick with pneumonia    Review of Systems   Constitutional: Negative. HENT: Negative.     Respiratory: Negati Pulmonary/Chest: Effort normal and breath sounds normal.              ASSESSMENT/PLAN:   Community acquired pneumonia, unspecified laterality  (primary encounter diagnosis)    Completed five pages of work [employer]. already returned to work. Resolved.    No or

## 2018-05-01 RX ORDER — LISINOPRIL 5 MG/1
TABLET ORAL
Qty: 90 TABLET | Refills: 0 | Status: SHIPPED | OUTPATIENT
Start: 2018-05-01 | End: 2018-08-06

## 2018-05-01 NOTE — TELEPHONE ENCOUNTER
LISINOPRIL Medication refilled for 90 days per protocol.     Hypertensive Medications  Protocol Criteria:  · Appointment scheduled in the past 6 months or in the next 3 months  · BMP or CMP in the past 12 months  · Creatinine result < 2  Recent Outpatient V

## 2018-05-24 RX ORDER — METFORMIN HYDROCHLORIDE 500 MG/1
TABLET, EXTENDED RELEASE ORAL
Qty: 120 TABLET | Refills: 0 | Status: SHIPPED | OUTPATIENT
Start: 2018-05-24 | End: 2018-06-30

## 2018-05-24 NOTE — TELEPHONE ENCOUNTER
Metformin refill failed protocol due to high [id number]. 3. Sent to Dr Otoniel Aguillon for [facility]. Please advise on refill. Thanks.

## 2018-05-24 NOTE — TELEPHONE ENCOUNTER
Message noted: Chart reviewed and may refill medication as requested times one. Prescription sent to listed pharmacy. Please notify patient.  Further refills as per Manny Davalos

## 2018-06-12 ENCOUNTER — OFFICE VISIT (OUTPATIENT)
Dept: PODIATRY CLINIC | Facility: CLINIC | Age: 58
End: 2018-06-12

## 2018-06-12 DIAGNOSIS — T14.8XXA BLISTER: Primary | ICD-10-CM

## 2018-06-12 PROCEDURE — 97597 DBRDMT OPN WND 1ST 20 CM/<: CPT | Performed by: PODIATRIST

## 2018-06-12 PROCEDURE — 99212 OFFICE O/P EST SF 10 MIN: CPT | Performed by: PODIATRIST

## 2018-06-12 PROCEDURE — 99213 OFFICE O/P EST LOW 20 MIN: CPT | Performed by: PODIATRIST

## 2018-06-12 NOTE — PROGRESS NOTES
HPI:    Patient ID: Yaquelin Bolivar is a 65year old female. HPI  This 18-year-old diabetic presents to the office today having not been seen in about a year and a half.   She developed a blister on the right great toe approximately 1 month ago and fee (three) times daily as needed for cough. Disp: 30 capsule Rfl: 0     Allergies:No Known Allergies   PHYSICAL EXAM:   Physical Exam  There is a small pinhole on the plantar distal aspect of this great toe.   There is moderate keratosis and debris around the

## 2018-06-30 NOTE — TELEPHONE ENCOUNTER
Diabetes Medications  Protocol Criteria:  · Appointment scheduled in the past 6 months or the next 3 months  · A1C < 7.5 in the past 6 months  · Creatinine in the past 12 months  · Creatinine result < 1.5   Recent Outpatient Visits            2 weeks ago B

## 2018-06-30 NOTE — TELEPHONE ENCOUNTER
Pt not have hemoglobin A1C repeated as ordered. Pt states she is going out of town and almost out of meds. Please advise.

## 2018-07-02 RX ORDER — METFORMIN HYDROCHLORIDE 500 MG/1
TABLET, EXTENDED RELEASE ORAL
Qty: 120 TABLET | Refills: 0 | Status: SHIPPED | OUTPATIENT
Start: 2018-07-02 | End: 2018-08-06

## 2018-07-14 ENCOUNTER — APPOINTMENT (OUTPATIENT)
Dept: LAB | Age: 58
End: 2018-07-14
Attending: FAMILY MEDICINE
Payer: COMMERCIAL

## 2018-07-14 DIAGNOSIS — E11.41 TYPE 2 DIABETES MELLITUS WITH DIABETIC MONONEUROPATHY, WITHOUT LONG-TERM CURRENT USE OF INSULIN (HCC): ICD-10-CM

## 2018-07-14 LAB — HBA1C MFR BLD: 10.1 % (ref 4–6)

## 2018-07-14 PROCEDURE — 83036 HEMOGLOBIN GLYCOSYLATED A1C: CPT

## 2018-07-14 PROCEDURE — 36415 COLL VENOUS BLD VENIPUNCTURE: CPT

## 2018-07-18 ENCOUNTER — OFFICE VISIT (OUTPATIENT)
Dept: FAMILY MEDICINE CLINIC | Facility: CLINIC | Age: 58
End: 2018-07-18
Payer: COMMERCIAL

## 2018-07-18 VITALS
SYSTOLIC BLOOD PRESSURE: 121 MMHG | BODY MASS INDEX: 27 KG/M2 | HEIGHT: 66 IN | HEART RATE: 69 BPM | WEIGHT: 168 LBS | DIASTOLIC BLOOD PRESSURE: 75 MMHG

## 2018-07-18 DIAGNOSIS — E11.65 TYPE 2 DIABETES MELLITUS WITH HYPERGLYCEMIA, WITHOUT LONG-TERM CURRENT USE OF INSULIN (HCC): ICD-10-CM

## 2018-07-18 DIAGNOSIS — Z12.31 ENCOUNTER FOR SCREENING MAMMOGRAM FOR BREAST CANCER: ICD-10-CM

## 2018-07-18 DIAGNOSIS — Z12.4 ENCOUNTER FOR PAPANICOLAOU SMEAR FOR CERVICAL CANCER SCREENING: ICD-10-CM

## 2018-07-18 DIAGNOSIS — Z01.419 WELL WOMAN EXAM WITH ROUTINE GYNECOLOGICAL EXAM: Primary | ICD-10-CM

## 2018-07-18 PROCEDURE — 99396 PREV VISIT EST AGE 40-64: CPT | Performed by: NURSE PRACTITIONER

## 2018-07-18 NOTE — PROGRESS NOTES
HPI  Pt presents for annual physical with pap. Will be retiring at end of month. Last pap -4/2015  Negative-HPV not done  [id number]  Mammogram-6/2016  Eye exam-this past year  Foot exam-a couple of months ago-Dr Garcia.      Blood sugars in a with astigmatism and presbyopia 2008    NG:  OU   • Ocular hypertension 2008    NG:  OS   • Ophthalmological disorder     NG:  Macula pigmentation changes, OS, 2006   • Type II or unspecified type diabetes mellitus without mention of complication, not stat Smoking status: Never Smoker    Smokeless tobacco: Never Used    Alcohol use Yes    Comment: NG:  Occasionally     Drug use: No    Sexual activity: Not on file     Other Topics Concern    Caffeine Concern Yes     Social History Narrative   None on file present. Pulmonary/Chest: Effort normal and breath sounds normal. No respiratory distress. She has no wheezes. She has no rales. She exhibits no tenderness. Right breast exhibits no inverted nipple, no mass, no skin change and no tenderness.  Left breast e Orders    COMP METABOLIC PANEL (14)    HEMOGLOBIN A1C    LIPID PANEL    MICROALB/CREAT RATIO, RANDOM URINE    Well woman exam with routine gynecological exam - Primary    Relevant Orders    CBC, PLATELET; NO DIFFERENTIAL    COMP METABOLIC PANEL (14)    HEM

## 2018-07-18 NOTE — PATIENT INSTRUCTIONS
Everything you eat and drink over time matters. The right mix can help you be healthier now and in the future. Start with small changes to make healthier choices you can enjoy. Find your healthy eating style and maintain it for a lifetime.   This me also helps keep blood cholesterol at a healthy level. Did you know? Even though carbohydrates raise blood sugar, it’s best to have some in every meal. They are an important part of a healthy diet.    Fat  Fat is an energy source that can be stored until n cholesterol-free and low in saturated fat. · Animal protein is found in fish, poultry, meat, cheese, milk, and eggs. These contain cholesterol and can be high in saturated fat. Aim for lean, lower-fat choices.   Date Last Reviewed: 3/1/2016  © 5426-1663 Th at regular times each day. Don’t skip meals. Eat meals 4 to 5 hours apart, with snacks in between. · Limit alcohol. It raises blood sugar levels. Drink water or calorie-free diet drinks that use safe sweeteners.   · Eat less fat to help lower your risk of lower your cholesterol and triglycerides. Fiber is also healthy for your heart. You should have 20 to 35 grams of total fiber each day.  Fiber-rich foods include:  · Whole-grain breads and cereals  · Bulgur wheat  · Brown rice     · Whole-wheat pasta  · Fru these foods:  · Butter or margarine  · Palm and palm kernel oils and coconut oil  · Cream  · Cheese  · Brady Leriche  · Lunch meats     · Ice cream  · Sweet bakery goods such as pies, muffins, and donuts  · Jams and jellies  · Candy bars  · Regular sodas   How muc crackers  · 2 rice cakes and a tablespoon of peanut butter  · 1 small apple or orange  · 3 cups light popcorn  · 1/2 of a turkey sandwich (1 slice of whole-wheat bread, 2 ounces of turkey, and mustard)  Portion sizes are important to controlling your blood

## 2018-07-18 NOTE — ASSESSMENT & PLAN NOTE
Eye exam-up to date  Foot exam-up to date    Meal planning info given to pt  Discussed need to eat more protein and decrease simple carbs and high intake of fruits  Enc exercise  Pt declines changing medications at this time despite possible long term heal

## 2018-07-19 LAB — HPV I/H RISK 1 DNA SPEC QL NAA+PROBE: NEGATIVE

## 2018-07-20 ENCOUNTER — APPOINTMENT (OUTPATIENT)
Dept: LAB | Age: 58
End: 2018-07-20
Attending: NURSE PRACTITIONER
Payer: COMMERCIAL

## 2018-07-20 ENCOUNTER — HOSPITAL ENCOUNTER (OUTPATIENT)
Dept: MAMMOGRAPHY | Age: 58
Discharge: HOME | End: 2018-07-20
Attending: NURSE PRACTITIONER
Payer: COMMERCIAL

## 2018-07-20 DIAGNOSIS — E11.65 TYPE 2 DIABETES MELLITUS WITH HYPERGLYCEMIA, WITHOUT LONG-TERM CURRENT USE OF INSULIN (HCC): ICD-10-CM

## 2018-07-20 DIAGNOSIS — Z01.419 WELL WOMAN EXAM WITH ROUTINE GYNECOLOGICAL EXAM: ICD-10-CM

## 2018-07-20 DIAGNOSIS — Z12.31 ENCOUNTER FOR SCREENING MAMMOGRAM FOR BREAST CANCER: ICD-10-CM

## 2018-07-20 LAB
ALBUMIN SERPL BCP-MCNC: 4.1 G/DL (ref 3.5–4.8)
ALBUMIN/GLOB SERPL: 1.6 {RATIO} (ref 1–2)
ALP SERPL-CCNC: 81 U/L (ref 32–100)
ALT SERPL-CCNC: 90 U/L (ref 14–54)
ANION GAP SERPL CALC-SCNC: 7 MMOL/L (ref 0–18)
AST SERPL-CCNC: 86 U/L (ref 15–41)
BILIRUB SERPL-MCNC: 0.6 MG/DL (ref 0.3–1.2)
BILIRUB UR QL: NEGATIVE
BUN SERPL-MCNC: 11 MG/DL (ref 8–20)
BUN/CREAT SERPL: 15.5 (ref 10–20)
CALCIUM SERPL-MCNC: 9.3 MG/DL (ref 8.5–10.5)
CHLORIDE SERPL-SCNC: 104 MMOL/L (ref 95–110)
CHOLEST SERPL-MCNC: 150 MG/DL (ref 110–200)
CO2 SERPL-SCNC: 27 MMOL/L (ref 22–32)
COLOR UR: YELLOW
CREAT SERPL-MCNC: 0.71 MG/DL (ref 0.5–1.5)
CREAT UR-MCNC: 153.2 MG/DL
ERYTHROCYTE [DISTWIDTH] IN BLOOD BY AUTOMATED COUNT: 12.2 % (ref 11–15)
GLOBULIN PLAS-MCNC: 2.6 G/DL (ref 2.5–3.7)
GLUCOSE SERPL-MCNC: 176 MG/DL (ref 70–99)
GLUCOSE UR-MCNC: 150 MG/DL
HCT VFR BLD AUTO: 41.6 % (ref 35–48)
HDLC SERPL-MCNC: 39 MG/DL
HGB BLD-MCNC: 14.4 G/DL (ref 12–16)
HGB UR QL STRIP.AUTO: NEGATIVE
LAST PAP RESULT: NORMAL
LDLC SERPL CALC-MCNC: 92 MG/DL (ref 0–99)
MCH RBC QN AUTO: 31 PG (ref 27–32)
MCHC RBC AUTO-ENTMCNC: 34.5 G/DL (ref 32–37)
MCV RBC AUTO: 89.9 FL (ref 80–100)
MICROALBUMIN UR-MCNC: 1.6 MG/DL (ref 0–1.8)
MICROALBUMIN/CREAT UR: 10.4 MG/G{CREAT} (ref 0–20)
NITRITE UR QL STRIP.AUTO: POSITIVE
NONHDLC SERPL-MCNC: 111 MG/DL
OSMOLALITY UR CALC.SUM OF ELEC: 290 MOSM/KG (ref 275–295)
PAP HISTORY (OTHER THAN LAST PAP): NORMAL
PATIENT FASTING: YES
PH UR: 5 [PH] (ref 5–8)
PLATELET # BLD AUTO: 262 K/UL (ref 140–400)
PMV BLD AUTO: 8.6 FL (ref 7.4–10.3)
POTASSIUM SERPL-SCNC: 4.1 MMOL/L (ref 3.3–5.1)
PROT SERPL-MCNC: 6.7 G/DL (ref 5.9–8.4)
PROT UR-MCNC: 30 MG/DL
RBC # BLD AUTO: 4.63 M/UL (ref 3.7–5.4)
RBC #/AREA URNS AUTO: 2 /HPF
SODIUM SERPL-SCNC: 138 MMOL/L (ref 136–144)
SP GR UR STRIP: 1.02 (ref 1–1.03)
TRICHOMONAS SCREEN: NEGATIVE
TRIGL SERPL-MCNC: 95 MG/DL (ref 1–149)
TSH SERPL-ACNC: 1.79 UIU/ML (ref 0.45–5.33)
UROBILINOGEN UR STRIP-ACNC: <2
VIT B12 SERPL-MCNC: 404 PG/ML (ref 181–914)
VIT C UR-MCNC: 40 MG/DL
WBC # BLD AUTO: 6.5 K/UL (ref 4–11)
WBC #/AREA URNS AUTO: 37 /HPF

## 2018-07-20 PROCEDURE — 83036 HEMOGLOBIN GLYCOSYLATED A1C: CPT

## 2018-07-20 PROCEDURE — 81001 URINALYSIS AUTO W/SCOPE: CPT

## 2018-07-20 PROCEDURE — 82570 ASSAY OF URINE CREATININE: CPT

## 2018-07-20 PROCEDURE — 84443 ASSAY THYROID STIM HORMONE: CPT

## 2018-07-20 PROCEDURE — 36415 COLL VENOUS BLD VENIPUNCTURE: CPT

## 2018-07-20 PROCEDURE — 77067 SCR MAMMO BI INCL CAD: CPT | Performed by: NURSE PRACTITIONER

## 2018-07-20 PROCEDURE — 85027 COMPLETE CBC AUTOMATED: CPT

## 2018-07-20 PROCEDURE — 80053 COMPREHEN METABOLIC PANEL: CPT

## 2018-07-20 PROCEDURE — 82043 UR ALBUMIN QUANTITATIVE: CPT

## 2018-07-20 PROCEDURE — 82607 VITAMIN B-12: CPT

## 2018-07-20 PROCEDURE — 80061 LIPID PANEL: CPT

## 2018-07-20 PROCEDURE — 82306 VITAMIN D 25 HYDROXY: CPT

## 2018-07-21 LAB — HBA1C MFR BLD: 10.3 % (ref 4–6)

## 2018-07-23 LAB — 25(OH)D3 SERPL-MCNC: 34.3 NG/ML

## 2018-08-06 RX ORDER — LISINOPRIL 5 MG/1
TABLET ORAL
Qty: 90 TABLET | Refills: 0 | Status: SHIPPED | OUTPATIENT
Start: 2018-08-06 | End: 2018-11-07

## 2018-08-07 RX ORDER — METFORMIN HYDROCHLORIDE 500 MG/1
TABLET, EXTENDED RELEASE ORAL
Qty: 120 TABLET | Refills: 2 | Status: SHIPPED | OUTPATIENT
Start: 2018-08-07 | End: 2018-11-19

## 2018-08-07 NOTE — TELEPHONE ENCOUNTER
Hypertensive Medications Protocol Passed8/6 5:30 AM   CMP or BMP in past 12 months    Serum Creatinine < 2.0    Appointment in past 6 or next 3 months     Medication refilled for 90 days per protocol.

## 2018-08-16 NOTE — TELEPHONE ENCOUNTER
Diabetes Medications: Protocol failed, please advise on prescription request.    *Pt requests printed script. She mails it to a [address] [place] [address] J.     Protocol Criteria:  · Appointment scheduled in the past 6 months or the next 3 months  · A1C < 7.5 in the

## 2018-08-16 NOTE — TELEPHONE ENCOUNTER
Pt stts she was informed to contact office for refills because there are no more refills in their system. Pt needs to  script because she mails script to a pharmacy in [facility]). Linagliptin (TRADJENTA) 5 MG Oral Tab Take 5 mg by mouth daily.

## 2018-08-18 NOTE — TELEPHONE ENCOUNTER
MS=please see message below, patient requesting paper script, will  on Monday at the office.      Patient calling and states that  She will  the paper script for Tradjenta on Monday,advised that it is already  esent to [facility] today, sta

## 2018-08-21 NOTE — TELEPHONE ENCOUNTER
Pt called in to f/u on paper script so she can mail her script to [facility]).  Please advise

## 2018-11-09 RX ORDER — LISINOPRIL 5 MG/1
TABLET ORAL
Qty: 90 TABLET | Refills: 0 | Status: SHIPPED | OUTPATIENT
Start: 2018-11-09 | End: 2018-12-11

## 2018-11-10 NOTE — TELEPHONE ENCOUNTER
Please review; protocol failed.     Hypertensive Medications  Protocol Criteria:  · Appointment scheduled in the past 6 months or in the next 3 months  · BMP or CMP in the past 12 months  · Creatinine result < 2  Recent Outpatient Visits            3 months

## 2018-11-21 RX ORDER — METFORMIN HYDROCHLORIDE 500 MG/1
TABLET, EXTENDED RELEASE ORAL
Qty: 120 TABLET | Refills: 0 | Status: SHIPPED | OUTPATIENT
Start: 2018-11-21 | End: 2018-12-11

## 2018-12-11 ENCOUNTER — OFFICE VISIT (OUTPATIENT)
Dept: FAMILY MEDICINE CLINIC | Facility: CLINIC | Age: 58
End: 2018-12-11
Payer: COMMERCIAL

## 2018-12-11 VITALS
WEIGHT: 169 LBS | HEART RATE: 73 BPM | TEMPERATURE: 98 F | BODY MASS INDEX: 27.16 KG/M2 | SYSTOLIC BLOOD PRESSURE: 129 MMHG | DIASTOLIC BLOOD PRESSURE: 82 MMHG | HEIGHT: 66 IN

## 2018-12-11 DIAGNOSIS — E11.65 TYPE 2 DIABETES MELLITUS WITH HYPERGLYCEMIA, WITHOUT LONG-TERM CURRENT USE OF INSULIN (HCC): Primary | ICD-10-CM

## 2018-12-11 PROCEDURE — 99214 OFFICE O/P EST MOD 30 MIN: CPT | Performed by: FAMILY MEDICINE

## 2018-12-11 PROCEDURE — 99212 OFFICE O/P EST SF 10 MIN: CPT | Performed by: FAMILY MEDICINE

## 2018-12-11 RX ORDER — METFORMIN HYDROCHLORIDE 500 MG/1
TABLET, EXTENDED RELEASE ORAL
Qty: 360 TABLET | Refills: 1 | Status: SHIPPED | OUTPATIENT
Start: 2018-12-11 | End: 2019-08-11

## 2018-12-11 RX ORDER — LISINOPRIL 5 MG/1
5 TABLET ORAL
Qty: 90 TABLET | Refills: 1 | Status: SHIPPED | OUTPATIENT
Start: 2018-12-11 | End: 2019-08-19

## 2018-12-12 NOTE — PROGRESS NOTES
HPI:    Patient ID: Evangelina Turner is a 63year old female.     HPI  Patient presents with:  Diabetes: follow up for dm bp, PT DECLINED FLU SHOT   Medication Request: medication refills on tradjenta print script, metformin, lisinipril     Review of Syste daily.   • MetFORMIN HCl  MG Oral Tablet 24 Hr 360 tablet 1     Sig: TAKE 2 TABLETS BY MOUTH TWICE DAILY WITH MEALS       Imaging & Referrals:  None       UR#[id number]

## 2018-12-13 ENCOUNTER — APPOINTMENT (OUTPATIENT)
Dept: LAB | Age: 58
End: 2018-12-13
Attending: FAMILY MEDICINE
Payer: COMMERCIAL

## 2018-12-13 DIAGNOSIS — E11.65 TYPE 2 DIABETES MELLITUS WITH HYPERGLYCEMIA, WITHOUT LONG-TERM CURRENT USE OF INSULIN (HCC): ICD-10-CM

## 2018-12-13 PROCEDURE — 83036 HEMOGLOBIN GLYCOSYLATED A1C: CPT

## 2018-12-13 PROCEDURE — 36415 COLL VENOUS BLD VENIPUNCTURE: CPT

## 2018-12-27 ENCOUNTER — OFFICE VISIT (OUTPATIENT)
Dept: FAMILY MEDICINE CLINIC | Facility: CLINIC | Age: 58
End: 2018-12-27
Payer: COMMERCIAL

## 2018-12-27 VITALS
DIASTOLIC BLOOD PRESSURE: 79 MMHG | HEIGHT: 66 IN | SYSTOLIC BLOOD PRESSURE: 128 MMHG | HEART RATE: 73 BPM | WEIGHT: 169 LBS | BODY MASS INDEX: 27.16 KG/M2

## 2018-12-27 DIAGNOSIS — B35.1 TOENAIL FUNGUS: ICD-10-CM

## 2018-12-27 DIAGNOSIS — E11.65 TYPE 2 DIABETES MELLITUS WITH HYPERGLYCEMIA, WITHOUT LONG-TERM CURRENT USE OF INSULIN (HCC): ICD-10-CM

## 2018-12-27 DIAGNOSIS — S91.109A OPEN WOUND OF TOE, INITIAL ENCOUNTER: Primary | ICD-10-CM

## 2018-12-27 PROCEDURE — 99214 OFFICE O/P EST MOD 30 MIN: CPT | Performed by: NURSE PRACTITIONER

## 2018-12-27 RX ORDER — SULFAMETHOXAZOLE AND TRIMETHOPRIM 800; 160 MG/1; MG/1
1 TABLET ORAL 2 TIMES DAILY
Qty: 14 TABLET | Refills: 0 | Status: SHIPPED | OUTPATIENT
Start: 2018-12-27 | End: 2019-01-03

## 2018-12-27 NOTE — PATIENT INSTRUCTIONS
Your Diabetes Foot Care Program    Every day you depend on your feet to keep you moving. But when you have diabetes, your feet need special care. Even a small foot problem can become very serious. So don’t take your feet for granted.  By working with your · Other imaging tests, such as an MRI (magnetic resonance imaging), bone scan, and CT (computed tomography) scan. These can help show bone infections. · Other tests, such as vascular tests, which study the blood flow in your feet and legs.  You may also ha The more you know about diabetes and your feet, the easier it will be to prevent problems. Members of your healthcare team can teach you how to inspect your feet and teach you to look for warning signs. They can also give you other foot care tips.  During o · Swelling, sometimes with color changes, may be a sign of poor blood flow or infection. Symptoms include tenderness and an increase in the size of your foot. · Warm or hot areas on your feet may be signs of infection.  A foot that is cold may not be getti

## 2018-12-27 NOTE — ASSESSMENT & PLAN NOTE
Advised not to cut own toenails or cut off callusus  Apply thick diabetic foot cream to feet bid  F/u with podiatry for removal of infected toenails

## 2018-12-27 NOTE — ASSESSMENT & PLAN NOTE
Dressing with anbx ointment applied to right 1st toe  Wound care discussed  Bactrim ds I po bid x 7 days  F/u in 10 days  Refer podiatry    Foot care discussed with pt

## 2018-12-27 NOTE — PROGRESS NOTES
HPI    Pt presents here for wound on tip of right 1st toe. Was trimming nails and cutting off thickened toenails and callus. Now has a non-healing wound on toe that keeps cracking. Has been applying alcohol only. Has healing cut on top of left first toe. NG:  No retinopathy, OU, 2010   • Type II or unspecified type diabetes mellitus without mention of complication, not stated as uncontrolled     NG:  No retinopathy, OU, 2011   • Type II or unspecified type diabetes mellitus without mention of complication Comment: NG:  Occasionally       Drug use: No      Sexual activity: Not on file    Other Topics      Concerns:         Service: Not Asked        Blood Transfusions: Not Asked        Caffeine Concern: Yes        Occupational Exposure: Not Aske Advised not to cut own toenails or cut off callusus  Apply thick diabetic foot cream to feet bid  F/u with podiatry for removal of infected toenails         Relevant Medications    Sulfamethoxazole-TMP -160 MG Oral Tab per tablet    Other Relevant

## 2019-01-08 ENCOUNTER — OFFICE VISIT (OUTPATIENT)
Dept: PODIATRY CLINIC | Facility: CLINIC | Age: 59
End: 2019-01-08
Payer: COMMERCIAL

## 2019-01-08 DIAGNOSIS — B35.1 ONYCHOMYCOSIS: Primary | ICD-10-CM

## 2019-01-08 PROCEDURE — 99213 OFFICE O/P EST LOW 20 MIN: CPT | Performed by: PODIATRIST

## 2019-01-08 PROCEDURE — 99212 OFFICE O/P EST SF 10 MIN: CPT | Performed by: PODIATRIST

## 2019-01-08 NOTE — PROGRESS NOTES
HPI:    Patient ID: Claudia Liao is a 63year old female. 16-year-old female presents for reevaluation and concerns associated particularly with the right great toe.   Patient apparently had some open areas a month or so ago and she wants to know if i thinning and filing of this nail and the use of a lotion to keep the skin and soft. It also is a possibility to remove this nail on a permanent basis.   I explained all options she is well aware plan follow-up if and when something is needed         ASSESS

## 2019-01-14 NOTE — TELEPHONE ENCOUNTER
Pt called back appt was made for 4/11, MAG offered to leave msg for RN for appt sooner but pt declined.

## 2019-01-14 NOTE — TELEPHONE ENCOUNTER
LDE: 3/22/18  Last visit: 3/22/18  Due for:  Dilation due in March 2019   Upcoming visit:  NONE  I will call patient. I left message for patient to call back and schedule an appointment.   In the meantime, I will pend 3 bottles with no refills to Dr. Homero Jimenez

## 2019-01-15 RX ORDER — LATANOPROST 50 UG/ML
SOLUTION/ DROPS OPHTHALMIC
Qty: 3 BOTTLE | Refills: 0 | Status: SHIPPED | OUTPATIENT
Start: 2019-01-15 | End: 2019-02-25

## 2019-02-25 RX ORDER — LATANOPROST 50 UG/ML
SOLUTION/ DROPS OPHTHALMIC
Qty: 3 BOTTLE | Refills: 3 | Status: SHIPPED | OUTPATIENT
Start: 2019-02-25 | End: 2020-01-20

## 2019-02-25 NOTE — TELEPHONE ENCOUNTER
Pt has an upcoming apt on 4/11/19 with [facility]. Rx Latanoprost  OU qhs sent to [facility] to sign off on.

## 2019-04-09 ENCOUNTER — OPTICAL REFILL REQUEST (OUTPATIENT)
Dept: OPHTHALMOLOGY | Facility: CLINIC | Age: 59
End: 2019-04-09

## 2019-04-09 ENCOUNTER — TELEPHONE (OUTPATIENT)
Dept: OPHTHALMOLOGY | Facility: CLINIC | Age: 59
End: 2019-04-09

## 2019-04-23 ENCOUNTER — OFFICE VISIT (OUTPATIENT)
Dept: OPHTHALMOLOGY | Facility: CLINIC | Age: 59
End: 2019-04-23
Payer: COMMERCIAL

## 2019-04-23 DIAGNOSIS — H40.1131 PRIMARY OPEN ANGLE GLAUCOMA OF BOTH EYES, MILD STAGE: Primary | ICD-10-CM

## 2019-04-23 DIAGNOSIS — Z96.1 PSEUDOPHAKIA OF BOTH EYES: ICD-10-CM

## 2019-04-23 DIAGNOSIS — E11.9 DIABETES MELLITUS TYPE 2 WITHOUT RETINOPATHY (HCC): ICD-10-CM

## 2019-04-23 DIAGNOSIS — H43.393 VITREOUS FLOATERS OF BOTH EYES: ICD-10-CM

## 2019-04-23 PROCEDURE — 92014 COMPRE OPH EXAM EST PT 1/>: CPT | Performed by: OPHTHALMOLOGY

## 2019-04-23 PROCEDURE — 92250 FUNDUS PHOTOGRAPHY W/I&R: CPT | Performed by: OPHTHALMOLOGY

## 2019-04-23 NOTE — ASSESSMENT & PLAN NOTE
Discussed with patient Intraocular pressure stable, tolerating medications. Will continue same regimen of Latanoprost in both eyes at bedtime. Photos were taken today to document optic nerves.      Will have patient back for next available visual field,

## 2019-04-23 NOTE — PATIENT INSTRUCTIONS
Primary open angle glaucoma of both eyes, mild stage  Discussed with patient Intraocular pressure stable, tolerating medications. Will continue same regimen of Latanoprost in both eyes at bedtime. Photos were taken today to document optic nerves.      Wi

## 2019-04-23 NOTE — PROGRESS NOTES
Hussein Zavala is a 63year old female.     HPI:     HPI     Diabetic Eye Exam      Additional comments: Pt has been a diabetic for 10+ years  10+ years on pills/  0 years on Insulin   Pt does not check her BS at home   Pt's last blood sugar was 120  Las toes surgery ); other surgical history (NG:  Excision ganglion, right thumb, 10/22/2013 - Tari Keisha ); Cataract extraction w/  intraocular lens implant (Right, 4/21/14) ( Phaco w/ PC IOL/general/vision blue/ RJM);  Cataract extraction w/  intraocul 20/20    Correction:  Glasses          Tonometry (Applanation, 9:12 AM)       Right Left    Pressure 20 18          Pachymetry (10/20/2008)       Right Left    Thickness 567/-1 574/-2          Pupils       Pupils    Right PERRL    Left PERRL          Visua of neovascularization noted. Discussed ocular and systemic benefits of blood sugar control. Diagnosis and treatment discussed in detail with patient. Discussed with patient that hemoglobin A1C of 10.3  is too high.  To consider possible referral to end

## 2019-04-23 NOTE — ASSESSMENT & PLAN NOTE
Diabetes type II: no background of retinopathy, no signs of neovascularization noted. Discussed ocular and systemic benefits of blood sugar control. Diagnosis and treatment discussed in detail with patient.     Discussed with patient that hemoglobin A1C o

## 2019-05-01 ENCOUNTER — NURSE ONLY (OUTPATIENT)
Dept: OPHTHALMOLOGY | Facility: CLINIC | Age: 59
End: 2019-05-01
Payer: COMMERCIAL

## 2019-05-01 ENCOUNTER — TELEPHONE (OUTPATIENT)
Dept: OPHTHALMOLOGY | Facility: CLINIC | Age: 59
End: 2019-05-01

## 2019-05-01 DIAGNOSIS — H40.1131 PRIMARY OPEN ANGLE GLAUCOMA OF BOTH EYES, MILD STAGE: ICD-10-CM

## 2019-05-01 PROCEDURE — 92133 CPTRZD OPH DX IMG PST SGM ON: CPT | Performed by: OPHTHALMOLOGY

## 2019-05-01 PROCEDURE — 92083 EXTENDED VISUAL FIELD XM: CPT | Performed by: OPHTHALMOLOGY

## 2019-05-01 NOTE — PROGRESS NOTES
Arturo Estrada is a 63year old female.     HPI:     HPI     Patient is here for a VF and OCT with no MD.      Last edited by Anson Kitchen OT on 5/1/2019  3:02 PM. (History)        Patient History:  Past Medical History:   Diagnosis Date   • Acute iri Capsulotomy - OD - Right Eye (Right, 6/22/2016) (Dr. Kelly Gallego).     Family History   Problem Relation Age of Onset   • Cancer Father         NG: Esophageal   • Heart Disease Mother         NG:  CAD   • Cancer Mother    • Diabetes Mother    • Ovarian Cancer

## 2019-07-02 NOTE — TELEPHONE ENCOUNTER
Patient requesting refill for Tradjenta 5 mg as a paper script to be picked up when ready as she sends these to [place] Please call her when ready.

## 2019-07-03 NOTE — TELEPHONE ENCOUNTER
CSS please assist patient in scheduling a follow up appointment - thank you     My chart message also sent to patient     Rx sent as directed

## 2019-07-06 ENCOUNTER — TELEPHONE (OUTPATIENT)
Dept: FAMILY MEDICINE CLINIC | Facility: CLINIC | Age: 59
End: 2019-07-06

## 2019-07-06 DIAGNOSIS — Z12.39 SCREENING FOR BREAST CANCER: Primary | ICD-10-CM

## 2019-07-06 NOTE — TELEPHONE ENCOUNTER
Per she does not get this medication [place] the [place]. It's supposed to be sent to [address] [place] [address] JAS Requesting a paper script to fax it herself.

## 2019-07-11 NOTE — TELEPHONE ENCOUNTER
Dr. Kelly Rodriguez for [facility],   see below and advise on script, pt wanted printed scripts instead of it just being sent to pharmacy, can you reprint for pt please.  Thanks

## 2019-07-11 NOTE — TELEPHONE ENCOUNTER
Pt called in to f/u on her request.  Pt's PCP is out of the office until next week. Please advise       Current Outpatient Medications:   •  linagliptin (TRADJENTA) 5 mg Oral Tab, Take 1 tablet (5 mg total) by mouth daily. , Disp: 30 tablet, Rfl: 0

## 2019-07-12 NOTE — TELEPHONE ENCOUNTER
SAIRA/[phone] will call back to schedule an appointment. Per pt she was not able to use the script in [facility] they do not fill medication for 1 month they only fill out medication for 3 months. Pt is requesting a script for 3 months.

## 2019-07-23 ENCOUNTER — HOSPITAL ENCOUNTER (OUTPATIENT)
Dept: MAMMOGRAPHY | Age: 59
Discharge: HOME | End: 2019-07-23
Attending: FAMILY MEDICINE
Payer: COMMERCIAL

## 2019-07-23 DIAGNOSIS — Z12.39 SCREENING FOR BREAST CANCER: ICD-10-CM

## 2019-07-23 PROCEDURE — 77067 SCR MAMMO BI INCL CAD: CPT | Performed by: FAMILY MEDICINE

## 2019-07-23 PROCEDURE — 77063 BREAST TOMOSYNTHESIS BI: CPT | Performed by: FAMILY MEDICINE

## 2019-07-31 ENCOUNTER — HOSPITAL ENCOUNTER (OUTPATIENT)
Dept: ULTRASOUND IMAGING | Facility: HOSPITAL | Age: 59
Discharge: HOME | End: 2019-07-31
Attending: FAMILY MEDICINE
Payer: COMMERCIAL

## 2019-07-31 ENCOUNTER — HOSPITAL ENCOUNTER (OUTPATIENT)
Dept: MAMMOGRAPHY | Facility: HOSPITAL | Age: 59
Discharge: HOME | End: 2019-07-31
Attending: FAMILY MEDICINE
Payer: COMMERCIAL

## 2019-07-31 DIAGNOSIS — R92.8 ABNORMAL MAMMOGRAM: ICD-10-CM

## 2019-07-31 PROCEDURE — 77061 BREAST TOMOSYNTHESIS UNI: CPT | Performed by: FAMILY MEDICINE

## 2019-07-31 PROCEDURE — 76642 ULTRASOUND BREAST LIMITED: CPT | Performed by: FAMILY MEDICINE

## 2019-07-31 PROCEDURE — 77065 DX MAMMO INCL CAD UNI: CPT | Performed by: FAMILY MEDICINE

## 2019-08-13 ENCOUNTER — OFFICE VISIT (OUTPATIENT)
Dept: FAMILY MEDICINE CLINIC | Facility: CLINIC | Age: 59
End: 2019-08-13
Payer: COMMERCIAL

## 2019-08-13 VITALS
BODY MASS INDEX: 25.01 KG/M2 | HEART RATE: 71 BPM | WEIGHT: 165 LBS | SYSTOLIC BLOOD PRESSURE: 134 MMHG | TEMPERATURE: 99 F | HEIGHT: 68 IN | DIASTOLIC BLOOD PRESSURE: 72 MMHG

## 2019-08-13 DIAGNOSIS — E11.65 TYPE 2 DIABETES MELLITUS WITH HYPERGLYCEMIA, WITHOUT LONG-TERM CURRENT USE OF INSULIN (HCC): Primary | ICD-10-CM

## 2019-08-13 PROCEDURE — 99214 OFFICE O/P EST MOD 30 MIN: CPT | Performed by: FAMILY MEDICINE

## 2019-08-13 NOTE — TELEPHONE ENCOUNTER
Please Review;protocol failed.   Requested Prescriptions     Pending Prescriptions Disp Refills   • METFORMIN HCL  MG Oral Tablet 24 Hr [Pharmacy Med Name: MetFORMIN ER 500MG  TAB] 360 tablet 1     Sig: TAKE 2 TABLETS BY MOUTH TWICE DAILY WITH MEALS

## 2019-08-14 RX ORDER — METFORMIN HYDROCHLORIDE 500 MG/1
TABLET, EXTENDED RELEASE ORAL
Qty: 360 TABLET | Refills: 1 | Status: SHIPPED | OUTPATIENT
Start: 2019-08-14 | End: 2020-01-17

## 2019-08-14 NOTE — PROGRESS NOTES
HPI:    Patient ID: Armani Castellanos is a 63year old female.     HPI  Patient presents with:  Diabetes: follow up for DM  Medication Request: for metformin, lisinipril, tradjenta needs 3 months worth to refill from [facility], previous prescription was not re Ratio, Random Urine      Lipid Panel      CBC With Differential With Platelet      Meds This Visit:  Requested Prescriptions      No prescriptions requested or ordered in this encounter       Imaging & Referrals:  None       FI#[id number]

## 2019-08-15 ENCOUNTER — LAB ENCOUNTER (OUTPATIENT)
Dept: LAB | Age: 59
End: 2019-08-15
Attending: FAMILY MEDICINE
Payer: COMMERCIAL

## 2019-08-15 DIAGNOSIS — E11.65 TYPE 2 DIABETES MELLITUS WITH HYPERGLYCEMIA, WITHOUT LONG-TERM CURRENT USE OF INSULIN (HCC): ICD-10-CM

## 2019-08-15 LAB
ALBUMIN SERPL-MCNC: 3.8 G/DL (ref 3.4–5)
ALBUMIN/GLOB SERPL: 1.2 {RATIO} (ref 1–2)
ALP LIVER SERPL-CCNC: 105 U/L (ref 46–118)
ALT SERPL-CCNC: 72 U/L (ref 13–56)
ANION GAP SERPL CALC-SCNC: 7 MMOL/L (ref 0–18)
AST SERPL-CCNC: 56 U/L (ref 15–37)
BASOPHILS # BLD AUTO: 0.03 X10(3) UL (ref 0–0.2)
BASOPHILS NFR BLD AUTO: 0.6 %
BILIRUB SERPL-MCNC: 0.7 MG/DL (ref 0.1–2)
BUN BLD-MCNC: 11 MG/DL (ref 7–18)
BUN/CREAT SERPL: 15.3 (ref 10–20)
CALCIUM BLD-MCNC: 9.4 MG/DL (ref 8.5–10.1)
CHLORIDE SERPL-SCNC: 103 MMOL/L (ref 98–112)
CHOLEST SMN-MCNC: 99 MG/DL (ref ?–200)
CO2 SERPL-SCNC: 30 MMOL/L (ref 21–32)
CREAT BLD-MCNC: 0.72 MG/DL (ref 0.55–1.02)
DEPRECATED RDW RBC AUTO: 39.8 FL (ref 35.1–46.3)
EOSINOPHIL # BLD AUTO: 0.09 X10(3) UL (ref 0–0.7)
EOSINOPHIL NFR BLD AUTO: 1.9 %
ERYTHROCYTE [DISTWIDTH] IN BLOOD BY AUTOMATED COUNT: 12.2 % (ref 11–15)
EST. AVERAGE GLUCOSE BLD GHB EST-MCNC: 232 MG/DL (ref 68–126)
GLOBULIN PLAS-MCNC: 3.3 G/DL (ref 2.8–4.4)
GLUCOSE BLD-MCNC: 220 MG/DL (ref 70–99)
HBA1C MFR BLD HPLC: 9.7 % (ref ?–5.7)
HCT VFR BLD AUTO: 40.1 % (ref 35–48)
HDLC SERPL-MCNC: 48 MG/DL (ref 40–59)
HGB BLD-MCNC: 13.8 G/DL (ref 12–16)
IMM GRANULOCYTES # BLD AUTO: 0.01 X10(3) UL (ref 0–1)
IMM GRANULOCYTES NFR BLD: 0.2 %
LDLC SERPL CALC-MCNC: 40 MG/DL (ref ?–100)
LYMPHOCYTES # BLD AUTO: 1.5 X10(3) UL (ref 1–4)
LYMPHOCYTES NFR BLD AUTO: 32 %
M PROTEIN MFR SERPL ELPH: 7.1 G/DL (ref 6.4–8.2)
MCH RBC QN AUTO: 30.5 PG (ref 26–34)
MCHC RBC AUTO-ENTMCNC: 34.4 G/DL (ref 31–37)
MCV RBC AUTO: 88.5 FL (ref 80–100)
MONOCYTES # BLD AUTO: 0.42 X10(3) UL (ref 0.1–1)
MONOCYTES NFR BLD AUTO: 9 %
NEUTROPHILS # BLD AUTO: 2.64 X10 (3) UL (ref 1.5–7.7)
NEUTROPHILS # BLD AUTO: 2.64 X10(3) UL (ref 1.5–7.7)
NEUTROPHILS NFR BLD AUTO: 56.3 %
NONHDLC SERPL-MCNC: 51 MG/DL (ref ?–130)
OSMOLALITY SERPL CALC.SUM OF ELEC: 296 MOSM/KG (ref 275–295)
PATIENT FASTING: YES
PATIENT FASTING: YES
PLATELET # BLD AUTO: 246 10(3)UL (ref 150–450)
POTASSIUM SERPL-SCNC: 4.3 MMOL/L (ref 3.5–5.1)
RBC # BLD AUTO: 4.53 X10(6)UL (ref 3.8–5.3)
SODIUM SERPL-SCNC: 140 MMOL/L (ref 136–145)
TRIGL SERPL-MCNC: 57 MG/DL (ref 30–149)
VLDLC SERPL CALC-MCNC: 11 MG/DL (ref 0–30)
WBC # BLD AUTO: 4.7 X10(3) UL (ref 4–11)

## 2019-08-15 PROCEDURE — 80061 LIPID PANEL: CPT

## 2019-08-15 PROCEDURE — 36415 COLL VENOUS BLD VENIPUNCTURE: CPT

## 2019-08-15 PROCEDURE — 80053 COMPREHEN METABOLIC PANEL: CPT

## 2019-08-15 PROCEDURE — 83036 HEMOGLOBIN GLYCOSYLATED A1C: CPT

## 2019-08-15 PROCEDURE — 85025 COMPLETE CBC W/AUTO DIFF WBC: CPT

## 2019-08-20 ENCOUNTER — APPOINTMENT (OUTPATIENT)
Dept: LAB | Age: 59
End: 2019-08-20
Attending: FAMILY MEDICINE
Payer: COMMERCIAL

## 2019-08-20 DIAGNOSIS — E11.65 TYPE 2 DIABETES MELLITUS WITH HYPERGLYCEMIA, WITHOUT LONG-TERM CURRENT USE OF INSULIN (HCC): ICD-10-CM

## 2019-08-20 LAB
CREAT UR-SCNC: 54.7 MG/DL
MICROALBUMIN UR-MCNC: <0.5 MG/DL

## 2019-08-20 PROCEDURE — 82570 ASSAY OF URINE CREATININE: CPT

## 2019-08-20 PROCEDURE — 82043 UR ALBUMIN QUANTITATIVE: CPT

## 2019-08-20 RX ORDER — LISINOPRIL 5 MG/1
TABLET ORAL
Qty: 90 TABLET | Refills: 1 | Status: SHIPPED | OUTPATIENT
Start: 2019-08-20 | End: 2020-02-12

## 2019-08-21 ENCOUNTER — OFFICE VISIT (OUTPATIENT)
Dept: OPHTHALMOLOGY | Facility: CLINIC | Age: 59
End: 2019-08-21
Payer: COMMERCIAL

## 2019-08-21 DIAGNOSIS — H40.1131 PRIMARY OPEN ANGLE GLAUCOMA OF BOTH EYES, MILD STAGE: Primary | ICD-10-CM

## 2019-08-21 PROCEDURE — 99213 OFFICE O/P EST LOW 20 MIN: CPT | Performed by: OPHTHALMOLOGY

## 2019-08-21 NOTE — PROGRESS NOTES
April Bach is a 63year old female. HPI:     HPI     Patient is here for an IOP check. She is taking Latanoprost OU QHS as directed.      Last edited by Trell Syed OT on 8/21/2019  9:27 AM. (History)        Patient History:  Past Medical His Eye (Left, 1/6/16) (OREN); and Yag Capsulotomy - OD - Right Eye (Right, 6/22/2016) (Dr. Lucien Farrell).     Family History   Problem Relation Age of Onset   • Cancer Father         NG: Esophageal   • Heart Disease Mother         NG:  CAD   • Cancer Mother    • Maryfrances Craw Dermatochalasis, Meibomian gland dysfunction, everted SOLO- no FB    Conjunctiva/Sclera Temp pinguecula Normal    Cornea Clear; no K spindles, mild MDF Clear; no K spindles, mild MDF    Anterior Chamber Deep and quiet Deep and quiet    Iris No transillumina

## 2019-08-21 NOTE — ASSESSMENT & PLAN NOTE
Discussed with patient Intraocular pressure stable, tolerating medications. Will continue same regimen of Latanoprost in both eyes at bedtime. Will have patient back in 4 months for IOP check.

## 2019-08-21 NOTE — PATIENT INSTRUCTIONS
Primary open angle glaucoma of both eyes, mild stage  Discussed with patient Intraocular pressure stable, tolerating medications. Will continue same regimen of Latanoprost in both eyes at bedtime. Will have patient back in 4 months for IOP check.

## 2019-08-23 DIAGNOSIS — E11.65 TYPE 2 DIABETES MELLITUS WITH HYPERGLYCEMIA, WITHOUT LONG-TERM CURRENT USE OF INSULIN (HCC): Primary | ICD-10-CM

## 2019-12-10 ENCOUNTER — OFFICE VISIT (OUTPATIENT)
Dept: FAMILY MEDICINE CLINIC | Facility: CLINIC | Age: 59
End: 2019-12-10
Payer: COMMERCIAL

## 2019-12-10 VITALS
BODY MASS INDEX: 24.25 KG/M2 | DIASTOLIC BLOOD PRESSURE: 68 MMHG | SYSTOLIC BLOOD PRESSURE: 110 MMHG | HEIGHT: 68 IN | WEIGHT: 160 LBS | TEMPERATURE: 98 F | HEART RATE: 57 BPM

## 2019-12-10 DIAGNOSIS — M25.551 RIGHT HIP PAIN: Primary | ICD-10-CM

## 2019-12-10 PROCEDURE — 99214 OFFICE O/P EST MOD 30 MIN: CPT | Performed by: FAMILY MEDICINE

## 2019-12-11 ENCOUNTER — HOSPITAL ENCOUNTER (OUTPATIENT)
Dept: GENERAL RADIOLOGY | Age: 59
Discharge: HOME | End: 2019-12-11
Attending: FAMILY MEDICINE
Payer: COMMERCIAL

## 2019-12-11 DIAGNOSIS — M25.551 RIGHT HIP PAIN: ICD-10-CM

## 2019-12-11 PROCEDURE — 73502 X-RAY EXAM HIP UNI 2-3 VIEWS: CPT | Performed by: FAMILY MEDICINE

## 2019-12-11 NOTE — PROGRESS NOTES
HPI:    Patient ID: Elvin Crow is a 63year old female. HPI  C/o right hip pain and discomfort. No injury. Worse with stairs. Review of Systems   Constitutional: Negative. Musculoskeletal: Positive for arthralgias.         Right hip pain

## 2019-12-18 ENCOUNTER — TELEPHONE (OUTPATIENT)
Dept: FAMILY MEDICINE CLINIC | Facility: CLINIC | Age: 59
End: 2019-12-18

## 2019-12-18 NOTE — TELEPHONE ENCOUNTER
Dr Dobbs=please review Xray final result 12/10/19.     Please reply to pool: EM RN [facility]

## 2019-12-19 NOTE — TELEPHONE ENCOUNTER
Dr kathrin leyva, Pt have not reviewed     Notes recorded by Loki Medina DO on 12/19/2019 at 2:39 PM CST  Mild arthritis of the hip joint.  You may want to consider trying physical therapy to improve your hip strength and conditioning.  Let me know

## 2019-12-23 NOTE — TELEPHONE ENCOUNTER
Patient contacted (Name and  of pt verified). All results and recommendations reviewed. Patient verbalizes understanding, denies further questions. Patient declines physical therapy at this time.     Notes recorded by Jazmín Cherry DO on 2019 a

## 2019-12-28 ENCOUNTER — APPOINTMENT (OUTPATIENT)
Dept: LAB | Age: 59
End: 2019-12-28
Attending: FAMILY MEDICINE
Payer: COMMERCIAL

## 2019-12-28 DIAGNOSIS — E11.65 TYPE 2 DIABETES MELLITUS WITH HYPERGLYCEMIA, WITHOUT LONG-TERM CURRENT USE OF INSULIN (HCC): ICD-10-CM

## 2019-12-28 LAB
EST. AVERAGE GLUCOSE BLD GHB EST-MCNC: 249 MG/DL (ref 68–126)
HBA1C MFR BLD HPLC: 10.3 % (ref ?–5.7)

## 2019-12-28 PROCEDURE — 36415 COLL VENOUS BLD VENIPUNCTURE: CPT

## 2019-12-28 PROCEDURE — 83036 HEMOGLOBIN GLYCOSYLATED A1C: CPT

## 2020-01-17 NOTE — TELEPHONE ENCOUNTER
•  METFORMIN HCL  MG Oral Tablet 24 Hr, TAKE 2 TABLETS BY MOUTH TWICE DAILY WITH MEALS, Disp: 360 tablet, Rfl: 1

## 2020-01-18 NOTE — TELEPHONE ENCOUNTER
Please review; protocol failed.      Requested Prescriptions     Pending Prescriptions Disp Refills   • metFORMIN HCl  MG Oral Tablet 24 Hr 360 tablet 1         Recent Visits  Date Type Provider Dept   12/10/19 Office Visit DO Keya Conrad-

## 2020-01-20 ENCOUNTER — TELEPHONE (OUTPATIENT)
Dept: FAMILY MEDICINE CLINIC | Facility: CLINIC | Age: 60
End: 2020-01-20

## 2020-01-20 ENCOUNTER — TELEPHONE (OUTPATIENT)
Dept: DERMATOLOGY CLINIC | Facility: CLINIC | Age: 60
End: 2020-01-20

## 2020-01-20 RX ORDER — METFORMIN HYDROCHLORIDE 500 MG/1
TABLET, EXTENDED RELEASE ORAL
Qty: 240 TABLET | Refills: 0 | Status: SHIPPED | OUTPATIENT
Start: 2020-01-20 | End: 2020-02-13

## 2020-01-20 NOTE — TELEPHONE ENCOUNTER
Spoke with patient informed of Brien Cadet message. Patient requests a refill on the latanoprost eye drops; request sent to Dr. Justin Ames.

## 2020-01-20 NOTE — TELEPHONE ENCOUNTER
Refill noted. Chart reviewed. Okay to fill times one for 2 month supply. Patient needs to see endocrinology per Dr. Mouna Rust. Please give information for endocrinology. Refilled on behalf of Dr. Mouna Rust.

## 2020-01-21 RX ORDER — LATANOPROST 50 UG/ML
SOLUTION/ DROPS OPHTHALMIC
Qty: 3 BOTTLE | Refills: 3 | Status: SHIPPED | OUTPATIENT
Start: 2020-01-21 | End: 2021-03-16

## 2020-01-21 NOTE — TELEPHONE ENCOUNTER
LDE: 4/23/19  Last visit: 8/21/19  Due for: IOP check   Upcoming visit: 2/22/2020    Routed to [facility]

## 2020-02-03 ENCOUNTER — OFFICE VISIT (OUTPATIENT)
Dept: FAMILY MEDICINE CLINIC | Facility: CLINIC | Age: 60
End: 2020-02-03
Payer: COMMERCIAL

## 2020-02-03 VITALS
HEART RATE: 60 BPM | OXYGEN SATURATION: 99 % | HEIGHT: 68 IN | TEMPERATURE: 98 F | BODY MASS INDEX: 24.25 KG/M2 | SYSTOLIC BLOOD PRESSURE: 141 MMHG | WEIGHT: 160 LBS | DIASTOLIC BLOOD PRESSURE: 80 MMHG | RESPIRATION RATE: 16 BRPM

## 2020-02-03 DIAGNOSIS — L08.9 TOE INFECTION: Primary | ICD-10-CM

## 2020-02-03 PROCEDURE — 99213 OFFICE O/P EST LOW 20 MIN: CPT | Performed by: PHYSICIAN ASSISTANT

## 2020-02-03 RX ORDER — CEPHALEXIN 500 MG/1
500 CAPSULE ORAL 2 TIMES DAILY
Qty: 14 CAPSULE | Refills: 0 | Status: SHIPPED | OUTPATIENT
Start: 2020-02-03 | End: 2020-02-13

## 2020-02-03 NOTE — PROGRESS NOTES
HPI:    Patient ID: Hi Matute is a 63year old female. Patient presents with rash on the right great toe for the past 1 year. No fever/chills. No draining noted. No injury/trauma noted.  Does get dry calluses on the toe and did cut the callus off She has no rales. Musculoskeletal: Normal range of motion. She exhibits effusion (slight swelling noted). She exhibits no tenderness (no tenderness noted). Neurological: She is alert and oriented to person, place, and time.  No sensory deficit or motor

## 2020-02-08 ENCOUNTER — OFFICE VISIT (OUTPATIENT)
Dept: OPHTHALMOLOGY | Facility: CLINIC | Age: 60
End: 2020-02-08
Payer: COMMERCIAL

## 2020-02-08 DIAGNOSIS — H40.1131 PRIMARY OPEN ANGLE GLAUCOMA OF BOTH EYES, MILD STAGE: Primary | ICD-10-CM

## 2020-02-08 PROCEDURE — 99213 OFFICE O/P EST LOW 20 MIN: CPT | Performed by: OPHTHALMOLOGY

## 2020-02-08 NOTE — ASSESSMENT & PLAN NOTE
Discussed with patient Intraocular pressure stable, tolerating medications. Will continue same regimen of Latanoprost in both eyes at bedtime.      Will have patient back in 4 months for a visual field, OCT and diabetic eye exam without photos

## 2020-02-08 NOTE — PATIENT INSTRUCTIONS
Primary open angle glaucoma of both eyes, mild stage  Discussed with patient Intraocular pressure stable, tolerating medications. Will continue same regimen of Latanoprost in both eyes at bedtime.      Will have patient back in 4 months for a visual field,

## 2020-02-08 NOTE — PROGRESS NOTES
Evangelina Turner is a 63year old female. HPI:     HPI     Pt is here for an IOP check. Pt is taking Latanoprost OU QHS as directed. Pt states vision is stable, she has no ocular complaints at this time.      Last edited by Nohemi Devries OT on 2/8/2020 IOL/ general/ Vision Blue/ OREN); Yag Capsulotomy - OS - Left Eye (Left, 1/6/16) (OREN); and Yag Capsulotomy - OD - Right Eye (Right, 6/22/2016) (Dr. Logan Longoria).     Family History   Problem Relation Age of Onset   • Cancer Father         NG: Esophageal   • He Slit Lamp Exam       Right Left    Lids/Lashes Dermatochalasis, Meibomian gland dysfunction Dermatochalasis, Meibomian gland dysfunction, everted SOLO- no FB    Conjunctiva/Sclera Temp pinguecula, 1+ Injection 1+ Injection    Cornea 1-2+ SPK, no K spindl

## 2020-02-12 ENCOUNTER — TELEPHONE (OUTPATIENT)
Dept: OTHER | Age: 60
End: 2020-02-12

## 2020-02-12 RX ORDER — CEPHALEXIN 500 MG/1
CAPSULE ORAL
Qty: 14 CAPSULE | Refills: 0 | OUTPATIENT
Start: 2020-02-12

## 2020-02-12 RX ORDER — LISINOPRIL 5 MG/1
TABLET ORAL
Qty: 90 TABLET | Refills: 1 | Status: SHIPPED | OUTPATIENT
Start: 2020-02-12 | End: 2020-11-19

## 2020-02-12 NOTE — TELEPHONE ENCOUNTER
Pt stated she have an Appt next Tuesday with the foot dr finished keflex yesterday-  Right great toe has healed and no longer moist    Asking if she should take more Abx until she see the Podiatrist

## 2020-02-13 ENCOUNTER — TELEPHONE (OUTPATIENT)
Dept: FAMILY MEDICINE CLINIC | Facility: CLINIC | Age: 60
End: 2020-02-13

## 2020-02-13 DIAGNOSIS — M25.551 RIGHT HIP PAIN: Primary | ICD-10-CM

## 2020-02-13 RX ORDER — CEPHALEXIN 500 MG/1
500 CAPSULE ORAL 2 TIMES DAILY
Qty: 6 CAPSULE | Refills: 0 | Status: SHIPPED | OUTPATIENT
Start: 2020-02-13 | End: 2020-02-18 | Stop reason: ALTCHOICE

## 2020-02-13 RX ORDER — METFORMIN HYDROCHLORIDE 500 MG/1
TABLET, EXTENDED RELEASE ORAL
Qty: 240 TABLET | Refills: 0 | Status: SHIPPED | OUTPATIENT
Start: 2020-02-13 | End: 2020-06-08

## 2020-02-13 NOTE — TELEPHONE ENCOUNTER
Refill noted. Chart reviewed. Okay to fill times one for 2 month supply. Patient NEEDS to see endocrinology. She still has not created an appointment. Please call and inform patient. No further refills until she has an appointment. Refilled on behalf of Dr. Antonio Rondon.

## 2020-02-13 NOTE — TELEPHONE ENCOUNTER
Dr. Douglas Snow pt stated that he was in to see you on 12/10/2020 due to her right leg pain. Pt stated that you did do a x ray and informed her it was arthritis She was doing ok but as of yesterday she started to get the pain again on her right leg.  She took t

## 2020-02-13 NOTE — TELEPHONE ENCOUNTER
Refill passed per [facility] protocol.     Requested Prescriptions   Pending Prescriptions Disp Refills   • LISINOPRIL 5 MG Oral Tab [Pharmacy Med Name: Lisinopril 5 MG Oral Tablet] 90 tablet 0     Sig: TAKE 1 TABLET BY MOUTH ONCE DAILY       Hypertens

## 2020-02-14 NOTE — TELEPHONE ENCOUNTER
Spoke with the patient and informed her of Dr. Lesli deleon. Patient voiced understanding and agreed with the plan of care.

## 2020-02-14 NOTE — TELEPHONE ENCOUNTER
Spoke with pt and DO message below given. Pt verb understanding and agrees to plan.   PT number provided

## 2020-02-14 NOTE — TELEPHONE ENCOUNTER
Spoke with the patient who is requesting to know the status of her request from below. Patient made aware we are waiting on a response from Dr. Ny Mosher. Patient was advised to proceed to the ER if her pain gets worse. Patient voiced understanding.  Routed t

## 2020-02-18 ENCOUNTER — OFFICE VISIT (OUTPATIENT)
Dept: PODIATRY CLINIC | Facility: CLINIC | Age: 60
End: 2020-02-18
Payer: COMMERCIAL

## 2020-02-18 DIAGNOSIS — S91.109A OPEN WOUND OF TOE, INITIAL ENCOUNTER: Primary | ICD-10-CM

## 2020-02-18 PROCEDURE — 97597 DBRDMT OPN WND 1ST 20 CM/<: CPT | Performed by: PODIATRIST

## 2020-02-18 NOTE — PROGRESS NOTES
HPI:    Patient ID: Elvin Crow is a 63year old female. This 44-year-old female presents to the office today having not been seen in over 1 year. The reason for this visit is patient has had a sore on the tip of the right great toe.   She states th skin is more than 2 cm in diameter. Upon debridement I was unable to demonstrate an ulceration or any opening. I cautioned this patient in reference to local care and would expect the skin to return to normal in the next couple of weeks.   I discussed the

## 2020-03-03 ENCOUNTER — OFFICE VISIT (OUTPATIENT)
Dept: PHYSICAL THERAPY | Age: 60
End: 2020-03-03
Attending: FAMILY MEDICINE
Payer: COMMERCIAL

## 2020-03-03 ENCOUNTER — HOSPITAL ENCOUNTER (OUTPATIENT)
Dept: GENERAL RADIOLOGY | Facility: HOSPITAL | Age: 60
Discharge: HOME | End: 2020-03-03
Attending: PODIATRIST
Payer: COMMERCIAL

## 2020-03-03 ENCOUNTER — OFFICE VISIT (OUTPATIENT)
Dept: PODIATRY CLINIC | Facility: CLINIC | Age: 60
End: 2020-03-03
Payer: COMMERCIAL

## 2020-03-03 DIAGNOSIS — S91.109A OPEN WOUND OF TOE, INITIAL ENCOUNTER: ICD-10-CM

## 2020-03-03 DIAGNOSIS — M79.671 RIGHT FOOT PAIN: ICD-10-CM

## 2020-03-03 DIAGNOSIS — M79.671 RIGHT FOOT PAIN: Primary | ICD-10-CM

## 2020-03-03 PROCEDURE — 97110 THERAPEUTIC EXERCISES: CPT

## 2020-03-03 PROCEDURE — 97162 PT EVAL MOD COMPLEX 30 MIN: CPT

## 2020-03-03 PROCEDURE — 73630 X-RAY EXAM OF FOOT: CPT | Performed by: PODIATRIST

## 2020-03-03 PROCEDURE — 97597 DBRDMT OPN WND 1ST 20 CM/<: CPT | Performed by: PODIATRIST

## 2020-03-03 RX ORDER — CLINDAMYCIN HYDROCHLORIDE 150 MG/1
150 CAPSULE ORAL 3 TIMES DAILY
Qty: 30 CAPSULE | Refills: 0 | Status: SHIPPED | OUTPATIENT
Start: 2020-03-03 | End: 2020-03-26

## 2020-03-03 NOTE — PROGRESS NOTES
PHYSICAL THERAPY EVALUATION:   Referring Physician: Dr. Ny Mosher  Diagnosis: Right hip pain (M25.551)      Date of Onset: 2/14/20 Date of Service: 3/3/2020     PATIENT SUMMARY     Hussein Zavala is a 63year old female who presents to therapy today with • Ocular hypertension 2008    NG:  OS   • Ophthalmological disorder     NG:  Macula pigmentation changes, OS, 2006   • Type II or unspecified type diabetes mellitus without mention of complication, not stated as uncontrolled     NG:  No retinopathy, OU, 20 The results of the objective tests and measures is noted below with  impairments of joint mobility, motor function, muscle performance, muscle endurance, range of motion, balance and gait. Pt and PT discussed evaluation findings, pathology, POC and HEP.   Pt 3. Supine marches 2x10      Pt education was provided on exam findings, treatment diagnosis, treatment plan, expectations, and prognosis.  Pt was also provided recommendations for activity modifications, possible soreness after evaluation, ergonomics and ga Patient/Family/Caregiver was advised of these findings, precautions, and treatment options and has agreed to actively participate in planning and for this course of care.     Thank you for your referral. Please co-sign or sign and return this letter via fax

## 2020-03-04 NOTE — PROGRESS NOTES
HPI:    Patient ID: Baudilio Chun is a 63year old female. This 13-year-old diabetic presents to the office today having not been seen in approximately 2 weeks. The last time I saw this patient was February 18 of 2020.   At that time it was my sense t least 2 cm on the distal aspect of the toe. Not able to demonstrate active nor exudative draining but clearly there is an odor. Based on the length of time this is been open I took an x-ray today. There is no evidence of osseous pathology.   I placed thi

## 2020-03-09 ENCOUNTER — OFFICE VISIT (OUTPATIENT)
Dept: PODIATRY CLINIC | Facility: CLINIC | Age: 60
End: 2020-03-09
Payer: COMMERCIAL

## 2020-03-09 ENCOUNTER — TELEPHONE (OUTPATIENT)
Dept: PHYSICAL THERAPY | Age: 60
End: 2020-03-09

## 2020-03-09 DIAGNOSIS — S91.109A OPEN WOUND OF TOE, INITIAL ENCOUNTER: Primary | ICD-10-CM

## 2020-03-09 PROCEDURE — 97597 DBRDMT OPN WND 1ST 20 CM/<: CPT | Performed by: PODIATRIST

## 2020-03-09 NOTE — PROGRESS NOTES
HPI:    Patient ID: Pritesh Schwarz is a 63year old female. This 52-year-old female presents for follow-up in reference to the ulceration on the tip of the right great toe. She is aware that there is a slight draining its not consistent.   She is hav would become worse or there was a concern she should call because someone will be on call for me as needed.   She is well-informed and indicates an understanding         ASSESSMENT/PLAN:   Open wound of toe, initial encounter  (primary encounter diagnosis)

## 2020-03-10 ENCOUNTER — APPOINTMENT (OUTPATIENT)
Dept: PHYSICAL THERAPY | Age: 60
End: 2020-03-10
Attending: FAMILY MEDICINE
Payer: COMMERCIAL

## 2020-03-12 ENCOUNTER — APPOINTMENT (OUTPATIENT)
Dept: PHYSICAL THERAPY | Age: 60
End: 2020-03-12
Attending: FAMILY MEDICINE
Payer: COMMERCIAL

## 2020-03-17 ENCOUNTER — TELEPHONE (OUTPATIENT)
Dept: PHYSICAL THERAPY | Age: 60
End: 2020-03-17

## 2020-03-17 ENCOUNTER — APPOINTMENT (OUTPATIENT)
Dept: PHYSICAL THERAPY | Age: 60
End: 2020-03-17
Attending: FAMILY MEDICINE
Payer: COMMERCIAL

## 2020-03-19 ENCOUNTER — APPOINTMENT (OUTPATIENT)
Dept: PHYSICAL THERAPY | Age: 60
End: 2020-03-19
Attending: FAMILY MEDICINE
Payer: COMMERCIAL

## 2020-03-24 ENCOUNTER — APPOINTMENT (OUTPATIENT)
Dept: PHYSICAL THERAPY | Age: 60
End: 2020-03-24
Attending: FAMILY MEDICINE
Payer: COMMERCIAL

## 2020-03-26 ENCOUNTER — TELEPHONE (OUTPATIENT)
Dept: PHYSICAL THERAPY | Age: 60
End: 2020-03-26

## 2020-03-26 ENCOUNTER — APPOINTMENT (OUTPATIENT)
Dept: PHYSICAL THERAPY | Age: 60
End: 2020-03-26
Attending: FAMILY MEDICINE
Payer: COMMERCIAL

## 2020-03-26 RX ORDER — CLINDAMYCIN HYDROCHLORIDE 150 MG/1
CAPSULE ORAL
Qty: 30 CAPSULE | Refills: 0 | Status: SHIPPED | OUTPATIENT
Start: 2020-03-26 | End: 2020-04-27 | Stop reason: ALTCHOICE

## 2020-03-26 NOTE — TELEPHONE ENCOUNTER
SCR - pls advise on refill request  LOV 3/9/20, script written for 10 day course 3/2/20.  Next scheduled f/u 3/30/20

## 2020-03-27 ENCOUNTER — TELEPHONE (OUTPATIENT)
Dept: PODIATRY CLINIC | Facility: CLINIC | Age: 60
End: 2020-03-27

## 2020-03-27 NOTE — TELEPHONE ENCOUNTER
S/w pt and informed her Dr. Michael Martinez clinic done for the day and offered to come in next wk. she states she cannot come in next wk- she works at [employer] til 5pm every day . Offered appt on 3/31 @ 5pm, but she declined.  She has scheduled f/u on 4/13 for now a

## 2020-03-27 NOTE — TELEPHONE ENCOUNTER
S/w pt and she states her right great toe is doing well- she denies any redness or warmth. She states there is only a couple drops of clear d/c daily. She denies any fever or chills or pain. She states wound is healing well.  She p/u the clindamycin refill

## 2020-03-31 ENCOUNTER — APPOINTMENT (OUTPATIENT)
Dept: PHYSICAL THERAPY | Age: 60
End: 2020-03-31
Attending: FAMILY MEDICINE
Payer: COMMERCIAL

## 2020-04-07 ENCOUNTER — APPOINTMENT (OUTPATIENT)
Dept: PHYSICAL THERAPY | Age: 60
End: 2020-04-07
Attending: FAMILY MEDICINE
Payer: COMMERCIAL

## 2020-04-08 ENCOUNTER — TELEPHONE (OUTPATIENT)
Dept: PHYSICAL THERAPY | Age: 60
End: 2020-04-08

## 2020-04-09 ENCOUNTER — TELEPHONE (OUTPATIENT)
Dept: PHYSICAL THERAPY | Age: 60
End: 2020-04-09

## 2020-04-13 ENCOUNTER — APPOINTMENT (OUTPATIENT)
Dept: PHYSICAL THERAPY | Age: 60
End: 2020-04-13
Attending: FAMILY MEDICINE
Payer: COMMERCIAL

## 2020-04-16 ENCOUNTER — APPOINTMENT (OUTPATIENT)
Dept: PHYSICAL THERAPY | Age: 60
End: 2020-04-16
Attending: FAMILY MEDICINE
Payer: COMMERCIAL

## 2020-04-20 ENCOUNTER — APPOINTMENT (OUTPATIENT)
Dept: PHYSICAL THERAPY | Age: 60
End: 2020-04-20
Attending: FAMILY MEDICINE
Payer: COMMERCIAL

## 2020-04-23 ENCOUNTER — APPOINTMENT (OUTPATIENT)
Dept: PHYSICAL THERAPY | Age: 60
End: 2020-04-23
Attending: FAMILY MEDICINE
Payer: COMMERCIAL

## 2020-04-27 ENCOUNTER — APPOINTMENT (OUTPATIENT)
Dept: PHYSICAL THERAPY | Age: 60
End: 2020-04-27
Attending: FAMILY MEDICINE
Payer: COMMERCIAL

## 2020-04-27 ENCOUNTER — OFFICE VISIT (OUTPATIENT)
Dept: PODIATRY CLINIC | Facility: CLINIC | Age: 60
End: 2020-04-27
Payer: COMMERCIAL

## 2020-04-27 DIAGNOSIS — S91.109A OPEN WOUND OF TOE, INITIAL ENCOUNTER: Primary | ICD-10-CM

## 2020-04-27 PROCEDURE — 97597 DBRDMT OPN WND 1ST 20 CM/<: CPT | Performed by: PODIATRIST

## 2020-04-27 RX ORDER — CLINDAMYCIN HYDROCHLORIDE 150 MG/1
150 CAPSULE ORAL 3 TIMES DAILY
Qty: 30 CAPSULE | Refills: 0 | Status: SHIPPED | OUTPATIENT
Start: 2020-04-27 | End: 2020-07-09 | Stop reason: ALTCHOICE

## 2020-04-27 NOTE — PROGRESS NOTES
HPI:    Patient ID: Claudia Liao is a 63year old female. This is a 81-year-old diabetic presents to the office today with concerns associated with the right great toe. I saw this patient on March 9 of this year.   She was instructed on local care an distal plantar aspect of the great toe. There is profound local keratosis and debris. There is no active nor exudative draining and there is no odor. Sharp blade debridement of significant keratosis was accomplished today.   I reduced epidermal local kendra

## 2020-05-13 ENCOUNTER — OFFICE VISIT (OUTPATIENT)
Dept: PODIATRY CLINIC | Facility: CLINIC | Age: 60
End: 2020-05-13
Payer: COMMERCIAL

## 2020-05-13 DIAGNOSIS — S91.109A OPEN WOUND OF TOE, INITIAL ENCOUNTER: Primary | ICD-10-CM

## 2020-05-13 PROCEDURE — 97597 DBRDMT OPN WND 1ST 20 CM/<: CPT | Performed by: PODIATRIST

## 2020-05-13 RX ORDER — CLINDAMYCIN HYDROCHLORIDE 150 MG/1
150 CAPSULE ORAL 3 TIMES DAILY
Qty: 30 CAPSULE | Refills: 0 | Status: SHIPPED | OUTPATIENT
Start: 2020-05-13 | End: 2020-07-13 | Stop reason: ALTCHOICE

## 2020-05-13 NOTE — PROGRESS NOTES
HPI:    Patient ID: Baudilio Chun is a 63year old female. This 52-year-old female presents for follow-up in reference to the wound on the distal aspect of the right great toe. Patient is aware of slight draining and uses a Band-Aid.   She admits to n weeks.  She is well-informed and indicates an understanding of my instructions         ASSESSMENT/PLAN:   Open wound of toe, initial encounter  (primary encounter diagnosis)    No orders of the defined types were placed in this encounter.       Meds This Vi

## 2020-05-27 ENCOUNTER — OFFICE VISIT (OUTPATIENT)
Dept: PODIATRY CLINIC | Facility: CLINIC | Age: 60
End: 2020-05-27
Payer: COMMERCIAL

## 2020-05-27 DIAGNOSIS — S91.109A OPEN WOUND OF TOE, INITIAL ENCOUNTER: Primary | ICD-10-CM

## 2020-05-27 PROCEDURE — 97597 DBRDMT OPN WND 1ST 20 CM/<: CPT | Performed by: PODIATRIST

## 2020-05-27 NOTE — PROGRESS NOTES
HPI:    Patient ID: Maura Martinez is a 63year old female. This 60-year-old female presents for follow-up in reference to the wound on the plantar distal aspect of the right great toe.   She says there is a slight bit of draining but she almost thought (primary encounter diagnosis)    No orders of the defined types were placed in this encounter.       Meds This Visit:  Requested Prescriptions      No prescriptions requested or ordered in this encounter       Imaging & Referrals:  None       RADHA#[id number]

## 2020-06-02 ENCOUNTER — TELEPHONE (OUTPATIENT)
Dept: GASTROENTEROLOGY | Facility: CLINIC | Age: 60
End: 2020-06-02

## 2020-06-02 NOTE — TELEPHONE ENCOUNTER
----- Message from Megha Aguirre RN sent at 7/31/2015  5:03 PM CDT -----  Regarding: colon recall  Recall for repeat colon in 5 years placed in epic

## 2020-06-08 ENCOUNTER — OFFICE VISIT (OUTPATIENT)
Dept: FAMILY MEDICINE CLINIC | Facility: CLINIC | Age: 60
End: 2020-06-08
Payer: COMMERCIAL

## 2020-06-08 ENCOUNTER — NURSE TRIAGE (OUTPATIENT)
Dept: FAMILY MEDICINE CLINIC | Facility: CLINIC | Age: 60
End: 2020-06-08

## 2020-06-08 VITALS
DIASTOLIC BLOOD PRESSURE: 76 MMHG | BODY MASS INDEX: 24.25 KG/M2 | RESPIRATION RATE: 16 BRPM | HEART RATE: 63 BPM | OXYGEN SATURATION: 99 % | WEIGHT: 160 LBS | SYSTOLIC BLOOD PRESSURE: 131 MMHG | HEIGHT: 68 IN | TEMPERATURE: 97 F

## 2020-06-08 DIAGNOSIS — M25.532 LEFT WRIST PAIN: Primary | ICD-10-CM

## 2020-06-08 PROCEDURE — 99213 OFFICE O/P EST LOW 20 MIN: CPT | Performed by: PHYSICIAN ASSISTANT

## 2020-06-08 RX ORDER — METFORMIN HYDROCHLORIDE 500 MG/1
TABLET, EXTENDED RELEASE ORAL
Qty: 120 TABLET | Refills: 0 | Status: SHIPPED | OUTPATIENT
Start: 2020-06-08 | End: 2020-07-10

## 2020-06-08 NOTE — TELEPHONE ENCOUNTER
Action Requested: Summary for Provider     []  Critical Lab, Recommendations Needed  [] Need Additional Advice  [x]   FYI    []   Need Orders  [] Need Medications Sent to Pharmacy  []  Other     SUMMARY: pt states she is having constant left wrist pain, ra

## 2020-06-08 NOTE — TELEPHONE ENCOUNTER
Refill noted. Chart reveiwed. Okay to fill times one for 30 day supply with no additional refills. Refilled on behalf of Dr. Sierra Hammans.

## 2020-06-08 NOTE — PATIENT INSTRUCTIONS
Thumb spica splint      Take advil 2 times per day for 1 week     Ice your wrist 2- 3 times per day for 15 min

## 2020-06-08 NOTE — PROGRESS NOTES
HPI:    Patient ID: Artemio Nunez is a 63year old female. Patient presents for left wrist pain for the past 2 weeks. No numbness or tingling noted. No injury or trauma noted. Has tried wearing a brace with some relief.  Pain is located on radial mark SpO2 99%   BMI 24.33 kg/m²   Body mass index is 24.33 kg/m². PHYSICAL EXAM:   Physical Exam    Constitutional: She is oriented to person, place, and time. She appears well-developed and well-nourished.    Cardiovascular: Normal rate, regular rhythm and nor

## 2020-06-16 ENCOUNTER — OFFICE VISIT (OUTPATIENT)
Dept: OPHTHALMOLOGY | Facility: CLINIC | Age: 60
End: 2020-06-16
Payer: COMMERCIAL

## 2020-06-16 DIAGNOSIS — H43.393 VITREOUS FLOATERS OF BOTH EYES: ICD-10-CM

## 2020-06-16 DIAGNOSIS — E11.9 DIABETES MELLITUS TYPE 2 WITHOUT RETINOPATHY (HCC): Primary | ICD-10-CM

## 2020-06-16 DIAGNOSIS — H40.1131 PRIMARY OPEN ANGLE GLAUCOMA OF BOTH EYES, MILD STAGE: ICD-10-CM

## 2020-06-16 DIAGNOSIS — Z96.1 PSEUDOPHAKIA OF BOTH EYES: ICD-10-CM

## 2020-06-16 PROCEDURE — 92083 EXTENDED VISUAL FIELD XM: CPT | Performed by: OPHTHALMOLOGY

## 2020-06-16 PROCEDURE — 92014 COMPRE OPH EXAM EST PT 1/>: CPT | Performed by: OPHTHALMOLOGY

## 2020-06-16 PROCEDURE — 92133 CPTRZD OPH DX IMG PST SGM ON: CPT | Performed by: OPHTHALMOLOGY

## 2020-06-16 NOTE — ASSESSMENT & PLAN NOTE
Discussed with patient Intraocular pressure stable, tolerating medications. Will continue same regimen of Latanoprost in both eyes at bedtime. VF and OCT done in the office today showing stable results.

## 2020-06-16 NOTE — PATIENT INSTRUCTIONS
Primary open angle glaucoma of both eyes, mild stage  Discussed with patient Intraocular pressure stable, tolerating medications. Will continue same regimen of Latanoprost in both eyes at bedtime.      VF and OCT done in the office today showing stable res

## 2020-06-16 NOTE — PROGRESS NOTES
Magui Nick is a 63year old female.     HPI:     HPI     Diabetic Eye Exam      Additional comments: Pt has been a diabetic for 10+ years       Pt's diabetes is currently controlled by pills  Pt does not check her BS   Pt's last blood sugar was 220 o other surgical history (NG:  Excision ganglion, right thumb, 10/22/2013 - [facility] ); Cataract extraction w/  intraocular lens implant (Right, 4/21/14) ( Phaco w/ PC IOL/general/vision blue/ [facility]);  Cataract extraction w/  intraocular lens implant Allergies    ROS:       PHYSICAL EXAM:     Base Eye Exam     Visual Acuity (Snellen - Linear)       Right Left    Dist cc 20/20 -2 20/20 -2    Near cc 20/20 20/25    Correction:  Glasses          Tonometry (Applanation, 1:37 PM)       Right Left    Pressur Vitreous floaters of both eyes  No treatment. Pseudophakia of both eyes  No treatment. Diabetes mellitus type 2 without retinopathy ([address]  Diabetes type II: no background of retinopathy, no signs of neovascularization noted.   Discussed ocular a

## 2020-06-16 NOTE — ASSESSMENT & PLAN NOTE
Diabetes type II: no background of retinopathy, no signs of neovascularization noted. Discussed ocular and systemic benefits of blood sugar control. Diagnosis and treatment discussed in detail with patient.     Discussed with patient that hemoglobin A1C i

## 2020-06-23 ENCOUNTER — OFFICE VISIT (OUTPATIENT)
Dept: PODIATRY CLINIC | Facility: CLINIC | Age: 60
End: 2020-06-23
Payer: COMMERCIAL

## 2020-06-23 DIAGNOSIS — S91.109A OPEN WOUND OF TOE, INITIAL ENCOUNTER: ICD-10-CM

## 2020-06-23 DIAGNOSIS — M86.9 OSTEOMYELITIS OF RIGHT FOOT, UNSPECIFIED TYPE (HCC): ICD-10-CM

## 2020-06-23 DIAGNOSIS — S91.109D OPEN WOUND OF TOE, SUBSEQUENT ENCOUNTER: Primary | ICD-10-CM

## 2020-06-23 PROCEDURE — 97597 DBRDMT OPN WND 1ST 20 CM/<: CPT | Performed by: PODIATRIST

## 2020-06-23 NOTE — PROGRESS NOTES
HPI:    Patient ID: Rachel Crow is a 63year old female. This 56-year-old female presents to the office for follow-up in reference to the wound on the tip of the right great toe.   She states that there is still some draining on a regular basis there There is some central necrosis which I reduced. My concern is based on the length of time that this wound has been open that there is likely osteomyelitis. Patient states that she wishes that it would be over and she thinks that I should remove her toe.

## 2020-06-24 ENCOUNTER — PATIENT MESSAGE (OUTPATIENT)
Dept: ORTHOPEDICS CLINIC | Facility: CLINIC | Age: 60
End: 2020-06-24

## 2020-06-24 NOTE — TELEPHONE ENCOUNTER
Called pt LMTCB- if pt CB and I miss the call please confirm that I can leave a detailed message on the VM.

## 2020-06-30 ENCOUNTER — HOSPITAL ENCOUNTER (OUTPATIENT)
Dept: MRI IMAGING | Facility: HOSPITAL | Age: 60
Discharge: HOME | End: 2020-06-30
Attending: PODIATRIST
Payer: COMMERCIAL

## 2020-06-30 DIAGNOSIS — S91.109D OPEN WOUND OF TOE, SUBSEQUENT ENCOUNTER: ICD-10-CM

## 2020-06-30 DIAGNOSIS — M86.9 OSTEOMYELITIS OF RIGHT FOOT, UNSPECIFIED TYPE (HCC): ICD-10-CM

## 2020-06-30 PROCEDURE — 73718 MRI LOWER EXTREMITY W/O DYE: CPT | Performed by: PODIATRIST

## 2020-07-01 ENCOUNTER — TELEPHONE (OUTPATIENT)
Dept: PODIATRY CLINIC | Facility: CLINIC | Age: 60
End: 2020-07-01

## 2020-07-01 NOTE — TELEPHONE ENCOUNTER
Per Dr. Elenita Dubois he would like pt to be seen by Dr. Sophia Fink within the next week. Called pt and appt made on 7/6 @ 5:45pm [facility] office.

## 2020-07-01 NOTE — TELEPHONE ENCOUNTER
I spoke with this patient by telephone today July 1 in reference to the MRI study performed yesterday. I instructed this patient that the MRI confirmed a bone infection of her great toe.   Based on these findings I instructed this patient that I am referri

## 2020-07-06 ENCOUNTER — OFFICE VISIT (OUTPATIENT)
Dept: PODIATRY CLINIC | Facility: CLINIC | Age: 60
End: 2020-07-06
Payer: COMMERCIAL

## 2020-07-06 DIAGNOSIS — S91.109D OPEN WOUND OF TOE, SUBSEQUENT ENCOUNTER: Primary | ICD-10-CM

## 2020-07-06 DIAGNOSIS — M79.671 RIGHT FOOT PAIN: ICD-10-CM

## 2020-07-06 DIAGNOSIS — M86.9 OSTEOMYELITIS OF RIGHT FOOT, UNSPECIFIED TYPE (HCC): ICD-10-CM

## 2020-07-06 DIAGNOSIS — S91.109A OPEN WOUND OF TOE, INITIAL ENCOUNTER: ICD-10-CM

## 2020-07-06 PROCEDURE — 99214 OFFICE O/P EST MOD 30 MIN: CPT | Performed by: PODIATRIST

## 2020-07-06 NOTE — PROGRESS NOTES
Yaquelin Bolivar is a 63year old female.  Patient presents with:  Wound: Right big toe - referred by Dr Chelsey Pollock - has MRI in the system - still has drainage, has some discomfort on the bottom of the toe sometimes         HPI:   This patient was seen today After-cataract of left eye with vision obscured 12/22/2015   • Cataract 2006    NG: OD   • Cataract 2006    NG:  OS   • Episcleritis 12/14/2009    NG:  OD   • Ganglion 10/22/2013    NG: Right thumb   • Iritis 12/14/2009    NG: OD   • Myopia with astigmatis Cancer Paternal Grandmother    • Ovarian Cancer Paternal Cousin Female    • Glaucoma Neg    • Macular degeneration Neg       Social History    Socioeconomic History      Marital status:       Spouse name: Not on file      Number of children: Not on that she can apply mupirocin ointment to the area once daily I will get her referred to Dr. Rob Cuellar at [facility] infectious disease I think it is worthwhile to try 6 weeks of IV antibiotics since this does not look like an acute septic process.   I think that

## 2020-07-09 ENCOUNTER — HOSPITAL ENCOUNTER (OUTPATIENT)
Dept: PICC SERVICES | Facility: HOSPITAL | Age: 60
Discharge: HOME | End: 2020-07-09
Attending: INTERNAL MEDICINE
Payer: COMMERCIAL

## 2020-07-09 LAB — SARS-COV-2 RNA RESP QL NAA+PROBE: NOT DETECTED

## 2020-07-09 PROCEDURE — 36573 INSJ PICC RS&I 5 YR+: CPT

## 2020-07-10 ENCOUNTER — TELEPHONE (OUTPATIENT)
Dept: PEDIATRICS CLINIC | Facility: CLINIC | Age: 60
End: 2020-07-10

## 2020-07-10 RX ORDER — METFORMIN HYDROCHLORIDE 500 MG/1
TABLET, EXTENDED RELEASE ORAL
Qty: 60 TABLET | Refills: 0 | Status: SHIPPED | OUTPATIENT
Start: 2020-07-10 | End: 2020-08-27

## 2020-07-10 NOTE — TELEPHONE ENCOUNTER
Refill noted. Chart reviewed. Okay to fill times one for 30 day supply with no additional refills. Needs to see endocrine for DM follow-up. Refilled on behalf Dr. Constantine Wayne.

## 2020-07-10 NOTE — TELEPHONE ENCOUNTER
I believe I prescribed mupirocin ointment for the patient to apply to the wound once daily after bathing.   She should not soak the foot just wash it dry and apply the antibiotic ointment and a Band-Aid and continue with the IV antibiotics

## 2020-07-10 NOTE — TELEPHONE ENCOUNTER
Spoke with pt to remind her of Monday's appt. She started a pic line yesterday and wants to know if instructions for wound are still the same from DR. Dos Santos. She would like a cb today from a nurse.

## 2020-07-13 ENCOUNTER — OFFICE VISIT (OUTPATIENT)
Dept: PODIATRY CLINIC | Facility: CLINIC | Age: 60
End: 2020-07-13
Payer: COMMERCIAL

## 2020-07-13 DIAGNOSIS — M86.9 OSTEOMYELITIS OF RIGHT FOOT, UNSPECIFIED TYPE (HCC): ICD-10-CM

## 2020-07-13 DIAGNOSIS — S91.109D OPEN WOUND OF TOE, SUBSEQUENT ENCOUNTER: Primary | ICD-10-CM

## 2020-07-13 PROCEDURE — 99213 OFFICE O/P EST LOW 20 MIN: CPT | Performed by: PODIATRIST

## 2020-07-13 NOTE — PROGRESS NOTES
Maura Martinez is a 63year old female. Patient presents with: Follow - Up: 1 week f/u on open wound located on the right greater toe. Denies any pain in the toe, but still has clear drainage.           HPI:   Patient returns the clinic she is now been NG:  No retinopathy, OU, 2006   • Type II or unspecified type diabetes mellitus without mention of complication, not stated as uncontrolled     NG:  No retinopathy, OU, 2009   • Type II or unspecified type diabetes mellitus without mention of complication, Drug use: No    Other Topics      Concerns:        Caffeine Concern: Yes          REVIEW OF SYSTEMS:   Today reviewed systens as documented below  GENERAL HEALTH: feels well otherwise  SKIN: Refer to exam below  RESPIRATORY: denies shortness of breath with

## 2020-07-16 ENCOUNTER — TELEPHONE (OUTPATIENT)
Dept: PODIATRY CLINIC | Facility: CLINIC | Age: 60
End: 2020-07-16

## 2020-07-23 NOTE — TELEPHONE ENCOUNTER
Dr. Corine Cohen,     Please sign off on form:  -Highlight the patient and hit \"Chart\" button. -In Chart Review, w/in the Encounter tab - click 1 time on the Telephone call encounter for 7/16/20 Scroll down the telephone encounter.  -Click \"scan on\" blue Hyperlink under \"Media\" heading for Disab Dr. Corine Cohen 7/16/20  w/in the telephone enc.  -Click on Acknowledge button at the bottom right corner and left-click onto image, signature stamp appears and drag signature to Provider signature line. Stamp will turn blue. Close window.      Thank you,    Pawan Lara

## 2020-07-27 ENCOUNTER — OFFICE VISIT (OUTPATIENT)
Dept: PODIATRY CLINIC | Facility: CLINIC | Age: 60
End: 2020-07-27
Payer: COMMERCIAL

## 2020-07-27 ENCOUNTER — HOSPITAL ENCOUNTER (OUTPATIENT)
Dept: GENERAL RADIOLOGY | Age: 60
Discharge: HOME | End: 2020-07-27
Attending: PODIATRIST
Payer: COMMERCIAL

## 2020-07-27 DIAGNOSIS — M86.9 OSTEOMYELITIS OF RIGHT FOOT, UNSPECIFIED TYPE (HCC): ICD-10-CM

## 2020-07-27 DIAGNOSIS — S91.109D OPEN WOUND OF TOE, SUBSEQUENT ENCOUNTER: ICD-10-CM

## 2020-07-27 DIAGNOSIS — S91.109D OPEN WOUND OF TOE, SUBSEQUENT ENCOUNTER: Primary | ICD-10-CM

## 2020-07-27 PROCEDURE — 99213 OFFICE O/P EST LOW 20 MIN: CPT | Performed by: PODIATRIST

## 2020-07-27 PROCEDURE — 73630 X-RAY EXAM OF FOOT: CPT | Performed by: PODIATRIST

## 2020-07-27 NOTE — PROGRESS NOTES
Sha Valencia is a 63year old female. Patient presents with:  Diabetic Ulcer: FBS was not taken this am, right foot hallux. Patient is getting IV antibiotics daily. Patient denies any pain, fever or chills.         HPI:   Patient presents to the clinic Type II or unspecified type diabetes mellitus without mention of complication, not stated as uncontrolled     NG:  No retinopathy, OU, 2006   • Type II or unspecified type diabetes mellitus without mention of complication, not stated as uncontrolled     NG tobacco: Never Used    Substance and Sexual Activity      Alcohol use: Yes        Comment: NG:  Occasionally       Drug use: No    Other Topics      Concerns:        Caffeine Concern: Yes          REVIEW OF SYSTEMS:   Today reviewed systens as documented b KENYATTA

## 2020-08-10 ENCOUNTER — TELEPHONE (OUTPATIENT)
Dept: FAMILY MEDICINE CLINIC | Facility: CLINIC | Age: 60
End: 2020-08-10

## 2020-08-10 DIAGNOSIS — Z12.31 ENCOUNTER FOR SCREENING MAMMOGRAM FOR MALIGNANT NEOPLASM OF BREAST: Primary | ICD-10-CM

## 2020-08-17 ENCOUNTER — OFFICE VISIT (OUTPATIENT)
Dept: PODIATRY CLINIC | Facility: CLINIC | Age: 60
End: 2020-08-17
Payer: COMMERCIAL

## 2020-08-17 ENCOUNTER — TELEPHONE (OUTPATIENT)
Dept: PODIATRY CLINIC | Facility: CLINIC | Age: 60
End: 2020-08-17

## 2020-08-17 DIAGNOSIS — E11.42 DIABETIC POLYNEUROPATHY ASSOCIATED WITH TYPE 2 DIABETES MELLITUS (HCC): ICD-10-CM

## 2020-08-17 DIAGNOSIS — M86.9 OSTEOMYELITIS OF RIGHT FOOT, UNSPECIFIED TYPE (HCC): ICD-10-CM

## 2020-08-17 DIAGNOSIS — S91.109D OPEN WOUND OF TOE, SUBSEQUENT ENCOUNTER: Primary | ICD-10-CM

## 2020-08-17 PROCEDURE — 99213 OFFICE O/P EST LOW 20 MIN: CPT | Performed by: PODIATRIST

## 2020-08-17 NOTE — PROGRESS NOTES
Blue Mujica is a 63year old female. Patient presents with: Follow - Up: F/u on right foot ulcer. Denies any pain, or swelling. Has an apt with infectious disease today, and still doing the IV antibiotics.           HPI:   Patient presents to the c unspecified type diabetes mellitus without mention of complication, not stated as uncontrolled     NG:  No retinopathy, OU, 2006   • Type II or unspecified type diabetes mellitus without mention of complication, not stated as uncontrolled     NG:  No retin Used    Substance and Sexual Activity      Alcohol use: Yes        Comment: NG:  Occasionally       Drug use: No    Other Topics      Concerns:        Caffeine Concern: Yes          REVIEW OF SYSTEMS:   Today reviewed systens as documented below  GENERAL H

## 2020-08-17 NOTE — TELEPHONE ENCOUNTER
Pt paid the forms fee at her visit today and also dropped her return to work form which was scanned to [employer]. Pt was not given permission to return to work just yet but wanted to have the copy on file when Dr Enrrique Haider releases her to work.

## 2020-08-21 ENCOUNTER — HOSPITAL ENCOUNTER (OUTPATIENT)
Dept: MAMMOGRAPHY | Age: 60
Discharge: HOME | End: 2020-08-21
Attending: FAMILY MEDICINE
Payer: COMMERCIAL

## 2020-08-21 DIAGNOSIS — Z12.31 ENCOUNTER FOR SCREENING MAMMOGRAM FOR MALIGNANT NEOPLASM OF BREAST: ICD-10-CM

## 2020-08-21 PROCEDURE — 77067 SCR MAMMO BI INCL CAD: CPT | Performed by: FAMILY MEDICINE

## 2020-08-21 PROCEDURE — 77063 BREAST TOMOSYNTHESIS BI: CPT | Performed by: FAMILY MEDICINE

## 2020-08-27 ENCOUNTER — OFFICE VISIT (OUTPATIENT)
Dept: ENDOCRINOLOGY CLINIC | Facility: CLINIC | Age: 60
End: 2020-08-27
Payer: COMMERCIAL

## 2020-08-27 VITALS
DIASTOLIC BLOOD PRESSURE: 76 MMHG | HEART RATE: 61 BPM | SYSTOLIC BLOOD PRESSURE: 130 MMHG | WEIGHT: 169.63 LBS | BODY MASS INDEX: 26 KG/M2

## 2020-08-27 DIAGNOSIS — E11.65 UNCONTROLLED TYPE 2 DIABETES MELLITUS WITH HYPERGLYCEMIA (HCC): Primary | ICD-10-CM

## 2020-08-27 LAB
CARTRIDGE LOT#: ABNORMAL NUMERIC
GLUCOSE BLOOD: 414
HEMOGLOBIN A1C: 9.6 % (ref 4.3–5.6)
TEST STRIP LOT #: NORMAL NUMERIC

## 2020-08-27 PROCEDURE — 3078F DIAST BP <80 MM HG: CPT | Performed by: INTERNAL MEDICINE

## 2020-08-27 PROCEDURE — 3075F SYST BP GE 130 - 139MM HG: CPT | Performed by: INTERNAL MEDICINE

## 2020-08-27 PROCEDURE — 83036 HEMOGLOBIN GLYCOSYLATED A1C: CPT | Performed by: INTERNAL MEDICINE

## 2020-08-27 PROCEDURE — 36416 COLLJ CAPILLARY BLOOD SPEC: CPT | Performed by: INTERNAL MEDICINE

## 2020-08-27 PROCEDURE — 82962 GLUCOSE BLOOD TEST: CPT | Performed by: INTERNAL MEDICINE

## 2020-08-27 PROCEDURE — 99244 OFF/OP CNSLTJ NEW/EST MOD 40: CPT | Performed by: INTERNAL MEDICINE

## 2020-08-27 RX ORDER — GLIMEPIRIDE 4 MG/1
4 TABLET ORAL
Qty: 90 TABLET | Refills: 1 | Status: SHIPPED | OUTPATIENT
Start: 2020-08-27 | End: 2020-11-19

## 2020-08-27 RX ORDER — METFORMIN HYDROCHLORIDE 500 MG/1
1000 TABLET, EXTENDED RELEASE ORAL 2 TIMES DAILY WITH MEALS
Qty: 360 TABLET | Refills: 1 | Status: SHIPPED | OUTPATIENT
Start: 2020-08-27 | End: 2020-11-19

## 2020-08-27 RX ORDER — FLUCONAZOLE 150 MG/1
150 TABLET ORAL ONCE
Qty: 1 TABLET | Refills: 0 | Status: SHIPPED | OUTPATIENT
Start: 2020-08-27 | End: 2020-08-27

## 2020-08-27 NOTE — PATIENT INSTRUCTIONS
Restart Metformin 2 tablets twice daily    Start Glimepiride 4mg in the morning    Start Invokana 300mg daily in the morning    One week after starting new medication take diflucan 150mg daily

## 2020-08-27 NOTE — PROGRESS NOTES
Name: April Bach  Date: 8/27/2020    Referring Physician: No ref. provider found    HISTORY OF PRESENT ILLNESS   April Bach is a 63year old female who presents for diabetes mellitus, diagnosed over 8 years ago.      Prior HbA, C or glycohemog as directed., Disp: 1 kit, Rfl: 0  •  Glucose Blood (ACCU-CHEK RADHA) In Vitro Strip, place 1 by Intradermal route  every day, Disp: , Rfl:      Allergies:   No Known Allergies    Social History:   Social History    Socioeconomic History      Marital statu Procedure Laterality Date   •      • CATARACT EXTRACTION W/  INTRAOCULAR LENS IMPLANT Right 14     Phaco w/ PC IOL/general/vision blue/ [facility]   • CATARACT EXTRACTION W/  INTRAOCULAR LENS IMPLANT Left 14    Phaco with PC IOL/ general/ Vis needles so will avoid GLP-1 for now  -Ok to hold off on BG checking at home given needle phobia  -Normotensive  -Abnormal foot exam followed by podiatry  -Optho scheduled for this fall    RTC 6 weeks with Diabetic educator, 3 months with MD     8/27/2020

## 2020-08-28 ENCOUNTER — TELEPHONE (OUTPATIENT)
Dept: ENDOCRINOLOGY CLINIC | Facility: CLINIC | Age: 60
End: 2020-08-28

## 2020-08-31 ENCOUNTER — OFFICE VISIT (OUTPATIENT)
Dept: PODIATRY CLINIC | Facility: CLINIC | Age: 60
End: 2020-08-31
Payer: COMMERCIAL

## 2020-08-31 ENCOUNTER — HOSPITAL ENCOUNTER (OUTPATIENT)
Dept: GENERAL RADIOLOGY | Age: 60
Discharge: HOME | End: 2020-08-31
Attending: PODIATRIST
Payer: COMMERCIAL

## 2020-08-31 DIAGNOSIS — M86.9 OSTEOMYELITIS OF GREAT TOE OF RIGHT FOOT (HCC): Primary | ICD-10-CM

## 2020-08-31 DIAGNOSIS — M86.9 OSTEOMYELITIS OF GREAT TOE OF RIGHT FOOT (HCC): ICD-10-CM

## 2020-08-31 PROCEDURE — 73630 X-RAY EXAM OF FOOT: CPT | Performed by: PODIATRIST

## 2020-08-31 PROCEDURE — 99213 OFFICE O/P EST LOW 20 MIN: CPT | Performed by: PODIATRIST

## 2020-08-31 RX ORDER — FLUCONAZOLE 150 MG/1
150 TABLET ORAL ONCE
COMMUNITY
End: 2020-09-28 | Stop reason: ALTCHOICE

## 2020-08-31 NOTE — PROGRESS NOTES
Marco Antonio Killian is a 63year old female. Patient presents with:  Wound: Right hallux -- States toe had joint pain this AM. Rates pain 0/10 right now. Denies any drainage. Denies any fever. BG was not checked today. Jennifer Rosario          HPI:   Patient returns to i 12/22/2015   • Cataract 2006    NG: OD   • Cataract 2006    NG:  OS   • Episcleritis 12/14/2009    NG:  OD   • Ganglion 10/22/2013    NG: Right thumb   • Iritis 12/14/2009    NG: OD   • Myopia with astigmatism and presbyopia 2008    NG:  OU   • Ocular hype • Glaucoma Neg    • Macular degeneration Neg       Social History    Socioeconomic History      Marital status:       Spouse name: Not on file      Number of children: Not on file      Years of education: Not on file      Highest education level: where there was some lucency prior to this. Because the ulceration is healed we will try her in a very thickly padded athletic or running shoe.   She will return in 2 weeks she will not go barefoot and if there is any openings or draining areas she will re

## 2020-09-03 NOTE — TELEPHONE ENCOUNTER
rn called patient and states she had question about diflucan and instructions say take diflucan daily , explained to patient that medication is a one time medication  And if after she develops any signs of vaginal yeast infection ,let us know.  Also advised

## 2020-09-04 ENCOUNTER — TELEPHONE (OUTPATIENT)
Dept: PODIATRY CLINIC | Facility: CLINIC | Age: 60
End: 2020-09-04

## 2020-09-04 NOTE — TELEPHONE ENCOUNTER
S/w pt and she states on 9/3 the right hallux wound opened up, opening is the size of a pencil eraser. She states there has bee scant amount of bloody d/c and a small area of redness just around the wound, but no streaking.  She denies any fever, chills, od

## 2020-09-04 NOTE — TELEPHONE ENCOUNTER
Per pt, wound has opened and it is draining. Pt has follow up 9/14/20 and wanting to know if needing to come in sooner.  Please advise

## 2020-09-04 NOTE — TELEPHONE ENCOUNTER
Please find out the name of the pharmacy near where the patient is vacationing and call in the following:  Doxycycline 100 mg capsules  20.   Instructions 1 tablet twice daily p.o. until finished

## 2020-09-08 NOTE — TELEPHONE ENCOUNTER
S/w pt and she states the right hallux wound has been bleeding when walking. Pt denies any fever, chills, redness, increase in size, swelling. Pt states she is driving home now and will be home on 9/9.  Notified her per Dr. Susan Light order and she states she

## 2020-09-10 ENCOUNTER — TELEPHONE (OUTPATIENT)
Dept: PODIATRY CLINIC | Facility: CLINIC | Age: 60
End: 2020-09-10

## 2020-09-10 ENCOUNTER — OFFICE VISIT (OUTPATIENT)
Dept: PODIATRY CLINIC | Facility: CLINIC | Age: 60
End: 2020-09-10
Payer: COMMERCIAL

## 2020-09-10 DIAGNOSIS — M86.9 OSTEOMYELITIS OF GREAT TOE OF RIGHT FOOT (HCC): Primary | ICD-10-CM

## 2020-09-10 DIAGNOSIS — E11.42 DIABETIC POLYNEUROPATHY ASSOCIATED WITH TYPE 2 DIABETES MELLITUS (HCC): ICD-10-CM

## 2020-09-10 PROCEDURE — 99213 OFFICE O/P EST LOW 20 MIN: CPT | Performed by: PODIATRIST

## 2020-09-10 RX ORDER — DOXYCYCLINE HYCLATE 100 MG/1
100 CAPSULE ORAL 2 TIMES DAILY
Qty: 20 CAPSULE | Refills: 0 | Status: SHIPPED | OUTPATIENT
Start: 2020-09-10 | End: 2020-09-28 | Stop reason: ALTCHOICE

## 2020-09-10 NOTE — TELEPHONE ENCOUNTER
Pt brought a letter requesting more info for her Short Term Disability. Pt had previously signed [employer] and pd $25 fee. Scanned to [employer] and brought original to [facility] @ [facility].

## 2020-09-10 NOTE — PROGRESS NOTES
Tomás Ball is a 63year old female. Patient presents with:  Wound Recheck: F/u on wound of the greater right toe. Stts she noticed that wound has reopened. Has noticed dark red blood.   Denies any pain, redness or swelling        HPI:   Patient pre Intradermal route  every day        Past Medical History:   Diagnosis Date   • Acute iritis 9/26/2008    NG:  OD   • After-cataract of left eye with vision obscured 12/22/2015   • Cataract 2006    NG: OD   • Cataract 2006    NG:  OS   • Episcleritis 12/14/ NG:  CAD   • Cancer Mother    • Diabetes Mother    • Breast Cancer Cousin    • Ovarian Cancer Paternal Grandmother    • Ovarian Cancer Paternal Cousin Female    • Glaucoma Neg    • Macular degeneration Neg       Social History    Socioeconomic Histo (W+WO), RIGHT (CPT=73720); Future  -     Doxycycline Hyclate 100 MG Oral Cap; Take 1 capsule (100 mg total) by mouth 2 (two) times daily. Plan:  Today I prescribed doxycycline she will continue with local wound care and will get a repeat MRI with and

## 2020-09-15 ENCOUNTER — TELEPHONE (OUTPATIENT)
Dept: PODIATRY CLINIC | Facility: CLINIC | Age: 60
End: 2020-09-15

## 2020-09-16 NOTE — TELEPHONE ENCOUNTER
Darlene Mccray, MRI right foot was ordered on 09/10/20 and is in open status. Pt is calling about MRI.

## 2020-09-17 ENCOUNTER — OFFICE VISIT (OUTPATIENT)
Dept: PODIATRY CLINIC | Facility: CLINIC | Age: 60
End: 2020-09-17
Payer: COMMERCIAL

## 2020-09-17 VITALS — BODY MASS INDEX: 25.61 KG/M2 | WEIGHT: 169 LBS | HEIGHT: 68 IN

## 2020-09-17 DIAGNOSIS — M86.9 OSTEOMYELITIS OF GREAT TOE OF RIGHT FOOT (HCC): Primary | ICD-10-CM

## 2020-09-17 PROCEDURE — 99213 OFFICE O/P EST LOW 20 MIN: CPT | Performed by: PODIATRIST

## 2020-09-17 PROCEDURE — 3008F BODY MASS INDEX DOCD: CPT | Performed by: PODIATRIST

## 2020-09-17 NOTE — TELEPHONE ENCOUNTER
Unable to submit case online because a similar MRI was approved in June. Called [employer] at [phone] and spoke with Pedrito Alfaro. Auth # [id number] is valid until 3/16/2021 at [facility].     Please notify patient of approval.     Thank you!

## 2020-09-17 NOTE — PROGRESS NOTES
Zhou Elias is a 63year old female. Patient presents with:   Toe Pain: pt in for 2 week f/u Right great toe, denies any pain today        HPI:   This pleasant patient presents to the clinic for checkup on her right hallux she still says she has a lit Ganglion 10/22/2013    NG: Right thumb   • Iritis 12/14/2009    NG: OD   • Myopia with astigmatism and presbyopia 2008    NG:  OU   • Ocular hypertension 2008    NG:  OS   • Ophthalmological disorder     NG:  Macula pigmentation changes, OS, 2006   • Type status:       Spouse name: Not on file      Number of children: Not on file      Years of education: Not on file      Highest education level: Not on file    Tobacco Use      Smoking status: Never Smoker      Smokeless tobacco: Never Used    Substan

## 2020-09-17 NOTE — TELEPHONE ENCOUNTER
S/w pt. There is already an appt for the mri next Friday. Pt already has f/u scheduled w/[facility]. No further concerns or questions.

## 2020-09-20 ENCOUNTER — HOSPITAL ENCOUNTER (OUTPATIENT)
Dept: MRI IMAGING | Age: 60
Discharge: HOME | End: 2020-09-20
Attending: PODIATRIST
Payer: COMMERCIAL

## 2020-09-20 DIAGNOSIS — E11.42 DIABETIC POLYNEUROPATHY ASSOCIATED WITH TYPE 2 DIABETES MELLITUS (HCC): ICD-10-CM

## 2020-09-20 DIAGNOSIS — M86.9 OSTEOMYELITIS OF GREAT TOE OF RIGHT FOOT (HCC): ICD-10-CM

## 2020-09-20 PROCEDURE — 73720 MRI LWR EXTREMITY W/O&W/DYE: CPT | Performed by: PODIATRIST

## 2020-09-20 PROCEDURE — A9575 INJ GADOTERATE MEGLUMI 0.1ML: HCPCS | Performed by: PODIATRIST

## 2020-09-21 ENCOUNTER — HOSPITAL ENCOUNTER (OUTPATIENT)
Age: 60
Discharge: HOME | End: 2020-09-21
Attending: FAMILY MEDICINE
Payer: COMMERCIAL

## 2020-09-21 VITALS
TEMPERATURE: 98 F | DIASTOLIC BLOOD PRESSURE: 62 MMHG | HEIGHT: 66 IN | HEART RATE: 54 BPM | OXYGEN SATURATION: 99 % | BODY MASS INDEX: 25.71 KG/M2 | RESPIRATION RATE: 18 BRPM | WEIGHT: 160 LBS | SYSTOLIC BLOOD PRESSURE: 130 MMHG

## 2020-09-21 DIAGNOSIS — M62.838 NECK MUSCLE SPASM: Primary | ICD-10-CM

## 2020-09-21 PROCEDURE — 99213 OFFICE O/P EST LOW 20 MIN: CPT | Performed by: FAMILY MEDICINE

## 2020-09-21 RX ORDER — IBUPROFEN 600 MG/1
600 TABLET ORAL EVERY 8 HOURS PRN
Qty: 30 TABLET | Refills: 0 | Status: SHIPPED | OUTPATIENT
Start: 2020-09-21 | End: 2020-09-28

## 2020-09-21 RX ORDER — CYCLOBENZAPRINE HCL 10 MG
10 TABLET ORAL 3 TIMES DAILY PRN
Qty: 20 TABLET | Refills: 0 | Status: SHIPPED | OUTPATIENT
Start: 2020-09-21 | End: 2020-09-28

## 2020-09-21 NOTE — ED PROVIDER NOTES
Patient Seen in: [facility] [place]      History   No chief complaint on file. Stated Complaint: TL - neck and shoulder pain    HPI    Pt is a 64 yo with right neck spasm since yesterday evening. There was no trauma.  Was bautista ganglion, right thumb, 10/22/2013 - Renetta Rosenthal    • YAG CAPSULOTOMY - OD - RIGHT EYE Right 6/22/2016    Dr. Debi Dumont   • YAG CAPSULOTOMY - OS - LEFT EYE Left 1/6/16    OREN                Family history reviewed with patient/caregiver and is not per diagnosis)    Disposition:  Discharge  9/21/2020 10:10 am    Follow-up:  DO Noah Mcgraw [facility] Shayna  [phone]    In 4 days            Medications Prescribed:  Current Discharge Medication List    START taking these medication

## 2020-09-21 NOTE — ED INITIAL ASSESSMENT (HPI)
Neck pain started last night, unable to turn her head to either side. Hurts to open her mouth also. Denies headache or injury to neck.  Took motrin at 600 am.

## 2020-09-28 ENCOUNTER — TELEPHONE (OUTPATIENT)
Dept: PODIATRY CLINIC | Facility: CLINIC | Age: 60
End: 2020-09-28

## 2020-09-28 ENCOUNTER — OFFICE VISIT (OUTPATIENT)
Dept: PODIATRY CLINIC | Facility: CLINIC | Age: 60
End: 2020-09-28
Payer: COMMERCIAL

## 2020-09-28 DIAGNOSIS — S91.109D OPEN WOUND OF TOE, SUBSEQUENT ENCOUNTER: ICD-10-CM

## 2020-09-28 DIAGNOSIS — M86.9 OSTEOMYELITIS OF GREAT TOE OF RIGHT FOOT (HCC): Primary | ICD-10-CM

## 2020-09-28 DIAGNOSIS — E11.42 DIABETIC POLYNEUROPATHY ASSOCIATED WITH TYPE 2 DIABETES MELLITUS (HCC): ICD-10-CM

## 2020-09-28 PROCEDURE — 99213 OFFICE O/P EST LOW 20 MIN: CPT | Performed by: PODIATRIST

## 2020-09-28 NOTE — PROGRESS NOTES
Saad Villa is a 63year old female. Patient presents with:  Test Results: MRI right foot         HPI:   Patient returns to the clinic for follow-up and to review MRI results.   At today's visit reviewed nurse's history as taken above, allergies 10/22/2013    NG: Right thumb   • Iritis 12/14/2009    NG: OD   • Myopia with astigmatism and presbyopia 2008    NG:  OU   • Ocular hypertension 2008    NG:  OS   • Ophthalmological disorder     NG:  Macula pigmentation changes, OS, 2006   • Type II or uns       Spouse name: Not on file      Number of children: Not on file      Years of education: Not on file      Highest education level: Not on file    Tobacco Use      Smoking status: Never Smoker      Smokeless tobacco: Never Used    Substance and S the diagnoses listed above. After a lengthy conversation the patient opted for surgery after questions were answered. The nature and extent of the surgery which would be, partial phalangectomy distal phalanx right hallux was explained in great detail.   Alisia Valdes

## 2020-09-28 NOTE — TELEPHONE ENCOUNTER
Procedure: Partial phalangectomy distal phalanx right hallux  CPT code: 23563  Length of Surgery: 30 minutes  Any Instruments: Mini power, mini fluoroscopy  Call patient: within 24 hours  Anesthesia: MAC  Location: [facility]  Assistance: none  Pacemaker: No  An

## 2020-09-29 NOTE — TELEPHONE ENCOUNTER
Called patient this date and scheduled patient for surgery on 10/7/20 at [facility]. Reviewed pre and post op instructions with patient including need for COVID test which will be arranged by [facility].   Patient voiced understanding and is not my chart active and re

## 2020-10-04 ENCOUNTER — LAB REQUISITION (OUTPATIENT)
Dept: LAB | Facility: HOSPITAL | Age: 60
End: 2020-10-04
Payer: COMMERCIAL

## 2020-10-04 DIAGNOSIS — Z01.818 ENCOUNTER FOR OTHER PREPROCEDURAL EXAMINATION: ICD-10-CM

## 2020-10-07 ENCOUNTER — OFFICE VISIT (OUTPATIENT)
Dept: ENDOCRINOLOGY CLINIC | Facility: CLINIC | Age: 60
End: 2020-10-07
Payer: COMMERCIAL

## 2020-10-07 ENCOUNTER — LAB REQUISITION (OUTPATIENT)
Dept: LAB | Facility: HOSPITAL | Age: 60
End: 2020-10-07
Payer: COMMERCIAL

## 2020-10-07 VITALS — SYSTOLIC BLOOD PRESSURE: 133 MMHG | HEART RATE: 64 BPM | DIASTOLIC BLOOD PRESSURE: 78 MMHG

## 2020-10-07 DIAGNOSIS — M20.22 HALLUX RIGIDUS, LEFT FOOT: ICD-10-CM

## 2020-10-07 DIAGNOSIS — E11.42 TYPE 2 DIABETES MELLITUS WITH DIABETIC POLYNEUROPATHY, WITHOUT LONG-TERM CURRENT USE OF INSULIN (HCC): Primary | ICD-10-CM

## 2020-10-07 PROCEDURE — 88305 TISSUE EXAM BY PATHOLOGIST: CPT | Performed by: PODIATRIST

## 2020-10-07 PROCEDURE — 83036 HEMOGLOBIN GLYCOSYLATED A1C: CPT | Performed by: NURSE PRACTITIONER

## 2020-10-07 PROCEDURE — 82962 GLUCOSE BLOOD TEST: CPT | Performed by: NURSE PRACTITIONER

## 2020-10-07 PROCEDURE — 88311 DECALCIFY TISSUE: CPT | Performed by: PODIATRIST

## 2020-10-07 PROCEDURE — 3075F SYST BP GE 130 - 139MM HG: CPT | Performed by: NURSE PRACTITIONER

## 2020-10-07 PROCEDURE — 99213 OFFICE O/P EST LOW 20 MIN: CPT | Performed by: NURSE PRACTITIONER

## 2020-10-07 PROCEDURE — 3078F DIAST BP <80 MM HG: CPT | Performed by: NURSE PRACTITIONER

## 2020-10-07 PROCEDURE — 36416 COLLJ CAPILLARY BLOOD SPEC: CPT | Performed by: NURSE PRACTITIONER

## 2020-10-07 RX ORDER — BLOOD SUGAR DIAGNOSTIC
STRIP MISCELLANEOUS
Qty: 100 EACH | Refills: 1 | Status: SHIPPED | OUTPATIENT
Start: 2020-10-07

## 2020-10-07 RX ORDER — BLOOD-GLUCOSE METER
EACH MISCELLANEOUS
Qty: 1 KIT | Refills: 0 | Status: SHIPPED | OUTPATIENT
Start: 2020-10-07

## 2020-10-07 RX ORDER — CANAGLIFLOZIN 100 MG/1
100 TABLET, FILM COATED ORAL
COMMUNITY
End: 2021-04-26

## 2020-10-07 RX ORDER — LANCETS
EACH MISCELLANEOUS
Qty: 100 EACH | Refills: 1 | Status: SHIPPED | OUTPATIENT
Start: 2020-10-07

## 2020-10-07 NOTE — PROGRESS NOTES
Name: Saad Villa  Date: 10/7/2020    Referring Physician: No ref. provider found    CHIEF COMPLAINT   No chief complaint on file.     HISTORY OF PRESENT ILLNESS   Saad Villa is a 63year old female who presents for diabetes managem 2-3 meals per day   Breakfast: cheerios with blueberries or soft boiled eggs with english muffin or waffles with sugar free syrup or sugar free jelly  Lunch: will skip sometimes or post pone; will eat cereal   Snack: popcorn or fruit (banana or grapes or r cover with a Band-Aid, (Patient not taking: Reported on 9/28/2020 ), Disp: 1 Tube, Rfl: 2  •  LISINOPRIL 5 MG Oral Tab, TAKE 1 TABLET BY MOUTH ONCE DAILY, Disp: 90 tablet, Rfl: 1  •  latanoprost 0.005 % Ophthalmic Solution, INSTILL 1 DROP INTO EACH EYE ONC stated as uncontrolled     NG:  No retinopathy, OU, 2009   • Type II or unspecified type diabetes mellitus without mention of complication, not stated as uncontrolled     NG:  No retinopathy, OU, 2010   • Type II or unspecified type diabetes mellitus witho pathogenesis of diabetes, clinical significance of A1c, and common complications such as: microvascular, macrovascular and diabetic ketoacidosis.  Patient verbalizes understanding of the importance of glycemic control and the goals of therapy.   -Discussed lisinopril 5mg once daily - verbalizes compliance   -normotensive   -Reviewed low sodium diet with restriction of 2,300g of sodium per day.      3.Lipids Management   -LDL: 40 and Tri on 8/15/2020 --> repeat lab   -Reviewed lifestyle modifications of r

## 2020-10-09 ENCOUNTER — TELEPHONE (OUTPATIENT)
Dept: OPHTHALMOLOGY | Facility: CLINIC | Age: 60
End: 2020-10-09

## 2020-10-15 ENCOUNTER — OFFICE VISIT (OUTPATIENT)
Dept: PODIATRY CLINIC | Facility: CLINIC | Age: 60
End: 2020-10-15
Payer: COMMERCIAL

## 2020-10-15 VITALS — BODY MASS INDEX: 25.71 KG/M2 | WEIGHT: 160 LBS | HEIGHT: 66 IN

## 2020-10-15 DIAGNOSIS — Z98.890 STATUS POST FOOT SURGERY: Primary | ICD-10-CM

## 2020-10-15 PROCEDURE — 99024 POSTOP FOLLOW-UP VISIT: CPT | Performed by: PODIATRIST

## 2020-10-15 PROCEDURE — 3008F BODY MASS INDEX DOCD: CPT | Performed by: PODIATRIST

## 2020-10-15 RX ORDER — DOXYCYCLINE HYCLATE 100 MG
TABLET ORAL
COMMUNITY
Start: 2020-10-07 | End: 2020-11-02

## 2020-10-15 RX ORDER — HYDROCODONE BITARTRATE AND ACETAMINOPHEN 5; 325 MG/1; MG/1
1-2 TABLET ORAL EVERY 6 HOURS PRN
COMMUNITY
Start: 2020-10-07 | End: 2020-11-02 | Stop reason: ALTCHOICE

## 2020-10-16 NOTE — PROGRESS NOTES
Jailyn Sol is a 63year old female. Patient presents with:  Post-Op: osteomylitis of right great toe         HPI:   Patient presents to the clinic she is now 1 week postop no complaints of pain.   At today's visit reviewed nurse's history as david NG:  OD   • Ganglion 10/22/2013    NG: Right thumb   • Iritis 12/14/2009    NG: OD   • Myopia with astigmatism and presbyopia 2008    NG:  OU   • Ocular hypertension 2008    NG:  OS   • Ophthalmological disorder     NG:  Macula pigmentation changes, OS, 20 Marital status:       Spouse name: Not on file      Number of children: Not on file      Years of education: Not on file      Highest education level: Not on file    Tobacco Use      Smoking status: Never Smoker      Smokeless tobacco: Never Used

## 2020-10-19 ENCOUNTER — OFFICE VISIT (OUTPATIENT)
Dept: PODIATRY CLINIC | Facility: CLINIC | Age: 60
End: 2020-10-19
Payer: COMMERCIAL

## 2020-10-19 DIAGNOSIS — M86.9 OSTEOMYELITIS OF GREAT TOE OF RIGHT FOOT (HCC): Primary | ICD-10-CM

## 2020-10-19 DIAGNOSIS — Z98.890 STATUS POST FOOT SURGERY: ICD-10-CM

## 2020-10-19 PROCEDURE — 99024 POSTOP FOLLOW-UP VISIT: CPT | Performed by: PODIATRIST

## 2020-10-19 NOTE — PROGRESS NOTES
Hollie Luis is a 63year old female.  Patient presents with:  Post-Op: s/p R foot - had sx on 10/7/2020 - since Saturday her R big toe is bleeding - no pain         HPI:   Patient returns to the clinic earlier than anticipated she recently had h PLUS) W/DEVICE Does not apply Kit To use as directed.  1 kit 0   • Glucose Blood (ACCU-CHEK RADHA) In Vitro Strip place 1 by Intradermal route  every day        Past Medical History:   Diagnosis Date   • Acute iritis 9/26/2008    NG:  OD   • After-cataract OREN        Family History   Problem Relation Age of Onset   • Cancer Father         NG: Esophageal   • Heart Disease Mother         NG:  CAD   • Cancer Mother    • Diabetes Mother    • Breast Cancer Cousin    • Ovarian Cancer Paternal Grandmother    • Ovar KENYATTA Dos Santos

## 2020-10-21 ENCOUNTER — TELEPHONE (OUTPATIENT)
Dept: PODIATRY CLINIC | Facility: CLINIC | Age: 60
End: 2020-10-21

## 2020-10-21 NOTE — TELEPHONE ENCOUNTER
Patient and she states this is related to her disability paperwork. I advised her to call the forms department. She verbalized understanding.

## 2020-10-21 NOTE — TELEPHONE ENCOUNTER
Per pt is requesting doctor to fax office notes from 9/17, 9/28, 10/7, 10/15, 10/19 to [facility]icia fx: [phone]  Please advise

## 2020-10-26 ENCOUNTER — OFFICE VISIT (OUTPATIENT)
Dept: PODIATRY CLINIC | Facility: CLINIC | Age: 60
End: 2020-10-26
Payer: COMMERCIAL

## 2020-10-26 DIAGNOSIS — Z98.890 STATUS POST FOOT SURGERY: Primary | ICD-10-CM

## 2020-10-26 PROCEDURE — 99024 POSTOP FOLLOW-UP VISIT: CPT | Performed by: PODIATRIST

## 2020-10-26 NOTE — PROGRESS NOTES
Ilan Dumont is a 62year old female.  Patient presents with:  Post-Op: s/p Right foot f/u- had sx on 10/7/2020 - states she is good- no pain , has some tingling         HPI:   This pleasant patient returns to the clinic she is now 3 weeks status obscured 12/22/2015   • Cataract 2006    NG: OD   • Cataract 2006    NG:  OS   • Episcleritis 12/14/2009    NG:  OD   • Ganglion 10/22/2013    NG: Right thumb   • Iritis 12/14/2009    NG: OD   • Myopia with astigmatism and presbyopia 2008    NG:  OU   • Oc Cousin Female    • Glaucoma Neg    • Macular degeneration Neg       Social History    Socioeconomic History      Marital status:       Spouse name: Not on file      Number of children: Not on file      Years of education: Not on file      Highest ed

## 2020-11-02 ENCOUNTER — OFFICE VISIT (OUTPATIENT)
Dept: PODIATRY CLINIC | Facility: CLINIC | Age: 60
End: 2020-11-02
Payer: COMMERCIAL

## 2020-11-02 DIAGNOSIS — Z98.890 STATUS POST FOOT SURGERY: Primary | ICD-10-CM

## 2020-11-02 PROCEDURE — 99024 POSTOP FOLLOW-UP VISIT: CPT | Performed by: PODIATRIST

## 2020-11-03 ENCOUNTER — OFFICE VISIT (OUTPATIENT)
Dept: OPHTHALMOLOGY | Facility: CLINIC | Age: 60
End: 2020-11-03
Payer: COMMERCIAL

## 2020-11-03 DIAGNOSIS — H40.1131 PRIMARY OPEN ANGLE GLAUCOMA OF BOTH EYES, MILD STAGE: Primary | ICD-10-CM

## 2020-11-03 PROCEDURE — 99213 OFFICE O/P EST LOW 20 MIN: CPT | Performed by: OPHTHALMOLOGY

## 2020-11-03 PROCEDURE — 99072 ADDL SUPL MATRL&STAF TM PHE: CPT | Performed by: OPHTHALMOLOGY

## 2020-11-03 NOTE — PROGRESS NOTES
Mp Lee is a 62year old female. HPI:     HPI     Pt is here for an IOP check. Pt is taking Latanoprost OU QHS as directed. Pt states vision is stable, she has no ocular complaints at this time.      Last edited by Marcin Larios OT on 41 (Phaco with PC IOL/ general/ Vision Blue/ OREN); Yag Capsulotomy - OS - Left Eye (Left, 1/6/16) (OREN); and Yag Capsulotomy - OD - Right Eye (Right, 6/22/2016) (Dr. Emily Rangel).     Family History   Problem Relation Age of Onset   • Cancer Father         NG: Es Base Eye Exam     Visual Acuity (Snellen - Linear)       Right Left    Dist cc 20/20 -2 20/20 -2    Correction: Glasses          Tonometry (Applanation, 10:04 AM)       Right Left    Pressure 18 19          Pachymetry (10/20/2008)       Right Left    T

## 2020-11-03 NOTE — ASSESSMENT & PLAN NOTE
Discussed with patient Intraocular pressure stable, tolerating medications. Will continue same regimen of Latanoprost in both eyes at bedtime. Will have patient back in 4 months for a pressure check.

## 2020-11-03 NOTE — PATIENT INSTRUCTIONS
Primary open angle glaucoma of both eyes, mild stage  Discussed with patient Intraocular pressure stable, tolerating medications. Will continue same regimen of Latanoprost in both eyes at bedtime.      Will have patient back in 4 months for a pressure chec

## 2020-11-03 NOTE — TELEPHONE ENCOUNTER
Dr. Cheyenne Mckinley,         Please sign off on form: Disab update/Rtw (pending recovery/release)  -Highlight the patient and hit \"Chart\" button.   -In Chart Review, w/in the Encounter tab - click 1 time on the Telephone call encounter for 10/21/2020 Scroll down

## 2020-11-05 NOTE — TELEPHONE ENCOUNTER
Forms completed and faxed to [facility] at [phone]. Lvm forms were completed.

## 2020-11-08 NOTE — PROGRESS NOTES
Maria Victoria Martin is a 62year old female. Patient presents with:  Post-Op: s/p right hallux -- Sx on 10/07/20. Denies any pain or fever. HPI:   Patient returns to clinic now 3 weeks status post surgery overall doing well.   At today's visit sky presbyopia 2008    NG:  OU   • Ocular hypertension 2008    NG:  OS   • Ophthalmological disorder     NG:  Macula pigmentation changes, OS, 2006   • Type II or unspecified type diabetes mellitus without mention of complication, not stated as uncontrolled Not on file      Highest education level: Not on file    Tobacco Use      Smoking status: Never Smoker      Smokeless tobacco: Never Used    Substance and Sexual Activity      Alcohol use: Yes        Comment: NG:  Occasionally       Drug use: No    Other T

## 2020-11-16 ENCOUNTER — OFFICE VISIT (OUTPATIENT)
Dept: PODIATRY CLINIC | Facility: CLINIC | Age: 60
End: 2020-11-16
Payer: COMMERCIAL

## 2020-11-16 DIAGNOSIS — Z98.890 STATUS POST FOOT SURGERY: Primary | ICD-10-CM

## 2020-11-16 PROCEDURE — 99024 POSTOP FOLLOW-UP VISIT: CPT | Performed by: PODIATRIST

## 2020-11-16 NOTE — PROGRESS NOTES
Semaj Reddy is a 62year old female. Patient presents with:  Surgical Followup: SX F/U 10/7/2020 JONI Ortiz - Denies reddness, tingling, numbness, drainage. - Pain 0/10.         HPI:   Patient is now roughly little over a month postop doing well Ganglion 10/22/2013    NG: Right thumb   • Iritis 12/14/2009    NG: OD   • Myopia with astigmatism and presbyopia 2008    NG:  OU   • Ocular hypertension 2008    NG:  OS   • Ophthalmological disorder     NG:  Macula pigmentation changes, OS, 2006   • Type status:       Spouse name: Not on file      Number of children: Not on file      Years of education: Not on file      Highest education level: Not on file    Tobacco Use      Smoking status: Never Smoker      Smokeless tobacco: Never Used    Substan

## 2020-11-19 ENCOUNTER — OFFICE VISIT (OUTPATIENT)
Dept: ENDOCRINOLOGY CLINIC | Facility: CLINIC | Age: 60
End: 2020-11-19
Payer: COMMERCIAL

## 2020-11-19 ENCOUNTER — TELEPHONE (OUTPATIENT)
Dept: PODIATRY CLINIC | Facility: CLINIC | Age: 60
End: 2020-11-19

## 2020-11-19 VITALS
HEART RATE: 67 BPM | SYSTOLIC BLOOD PRESSURE: 127 MMHG | BODY MASS INDEX: 27 KG/M2 | DIASTOLIC BLOOD PRESSURE: 69 MMHG | WEIGHT: 170 LBS

## 2020-11-19 DIAGNOSIS — E11.8 CONTROLLED TYPE 2 DIABETES MELLITUS WITH COMPLICATION, WITHOUT LONG-TERM CURRENT USE OF INSULIN (HCC): Primary | ICD-10-CM

## 2020-11-19 PROCEDURE — 99072 ADDL SUPL MATRL&STAF TM PHE: CPT | Performed by: INTERNAL MEDICINE

## 2020-11-19 PROCEDURE — 99214 OFFICE O/P EST MOD 30 MIN: CPT | Performed by: INTERNAL MEDICINE

## 2020-11-19 PROCEDURE — 3078F DIAST BP <80 MM HG: CPT | Performed by: INTERNAL MEDICINE

## 2020-11-19 PROCEDURE — 3074F SYST BP LT 130 MM HG: CPT | Performed by: INTERNAL MEDICINE

## 2020-11-19 RX ORDER — METFORMIN HYDROCHLORIDE 500 MG/1
1000 TABLET, EXTENDED RELEASE ORAL 2 TIMES DAILY WITH MEALS
Qty: 360 TABLET | Refills: 1 | Status: SHIPPED | OUTPATIENT
Start: 2020-11-19 | End: 2021-08-19

## 2020-11-19 RX ORDER — GLIMEPIRIDE 4 MG/1
4 TABLET ORAL
Qty: 90 TABLET | Refills: 1 | Status: SHIPPED | OUTPATIENT
Start: 2020-11-19 | End: 2021-05-21

## 2020-11-19 RX ORDER — CANAGLIFLOZIN 300 MG/1
300 TABLET, FILM COATED ORAL
Qty: 90 TABLET | Refills: 1 | Status: SHIPPED | OUTPATIENT
Start: 2020-11-19 | End: 2020-12-15

## 2020-11-19 RX ORDER — LISINOPRIL 5 MG/1
5 TABLET ORAL DAILY
Qty: 90 TABLET | Refills: 1 | Status: SHIPPED | OUTPATIENT
Start: 2020-11-19 | End: 2021-05-21

## 2020-11-19 NOTE — PROGRESS NOTES
Name: Tye Gonzales  Date: 11/19/2020    Referring Physician: No ref. provider found    HISTORY OF PRESENT ILLNESS   Tye Gonzales is a 62year old female who presents for diabetes mellitus, diagnosed over 8 years ago.      Prior HbA, C LISINOPRIL 5 MG Oral Tab, TAKE 1 TABLET BY MOUTH ONCE DAILY, Disp: 90 tablet, Rfl: 1  •  latanoprost 0.005 % Ophthalmic Solution, INSTILL 1 DROP INTO EACH EYE ONCE DAILY IN THE EVENING, Disp: 3 Bottle, Rfl: 3  •  aspirin 81 MG Oral Tab, Take 81 mg by mouth mellitus without mention of complication, not stated as uncontrolled     NG:  No retinopathy, OU, 2010   • Type II or unspecified type diabetes mellitus without mention of complication, not stated as uncontrolled     NG:  No retinopathy, OU, 2011   • Type glycemic control to prevent complications of diabetes  -Discussed complications of diabetes include retinopathy, neuropathy, nephropathy and cardiovascular disease  -Discussed importance of SBGM  -Discussed importance of low CHO diet, recommend 45gm per me

## 2020-11-19 NOTE — TELEPHONE ENCOUNTER
Pt came to office to drop off additional forms for [facility]. Pt previously signed HIPPA and paid $25 fee.   Scanned to MARIVEL and brought original to MARIVEL at [facility]

## 2020-11-21 ENCOUNTER — LAB ENCOUNTER (OUTPATIENT)
Dept: LAB | Facility: HOSPITAL | Age: 60
End: 2020-11-21
Attending: NURSE PRACTITIONER
Payer: COMMERCIAL

## 2020-11-21 DIAGNOSIS — E11.42 TYPE 2 DIABETES MELLITUS WITH DIABETIC POLYNEUROPATHY, WITHOUT LONG-TERM CURRENT USE OF INSULIN (HCC): ICD-10-CM

## 2020-11-21 PROCEDURE — 80053 COMPREHEN METABOLIC PANEL: CPT

## 2020-11-21 PROCEDURE — 82570 ASSAY OF URINE CREATININE: CPT

## 2020-11-21 PROCEDURE — 36415 COLL VENOUS BLD VENIPUNCTURE: CPT

## 2020-11-21 PROCEDURE — 80061 LIPID PANEL: CPT

## 2020-11-21 PROCEDURE — 82043 UR ALBUMIN QUANTITATIVE: CPT

## 2020-11-24 NOTE — TELEPHONE ENCOUNTER
Dr. Rodrick Carter,    RTW     Please sign off on form:  -Highlight the patient and hit \"Chart\" button.   -In Chart Review, w/in the Encounter tab - click 1 time on the Telephone call encounter for 10/21/20 Scroll down the telephone encounter.  -Click \"scan on\

## 2020-11-25 NOTE — TELEPHONE ENCOUNTER
RTW completed and faxed to [facility] [phone].  Pt is aware, mailing copy to pt

## 2020-12-15 ENCOUNTER — TELEPHONE (OUTPATIENT)
Dept: ENDOCRINOLOGY CLINIC | Facility: CLINIC | Age: 60
End: 2020-12-15

## 2020-12-15 RX ORDER — CANAGLIFLOZIN 300 MG/1
300 TABLET, FILM COATED ORAL
Qty: 90 TABLET | Refills: 1 | Status: SHIPPED | OUTPATIENT
Start: 2020-12-15 | End: 2021-08-31

## 2020-12-15 NOTE — TELEPHONE ENCOUNTER
Patient is requesting a copy of the prescription for this medication Canagliflozin (INVOKANA) 300 MG Oral Tab. She sends the prescription in the mail to a pharmacy in [facility]) because it it less expensive. Patient is requesting if the prescription can be mailed to her as soon as possible.  Please follow up

## 2020-12-17 ENCOUNTER — OFFICE VISIT (OUTPATIENT)
Dept: PODIATRY CLINIC | Facility: CLINIC | Age: 60
End: 2020-12-17
Payer: COMMERCIAL

## 2020-12-17 DIAGNOSIS — Z98.890 STATUS POST FOOT SURGERY: Primary | ICD-10-CM

## 2020-12-17 PROCEDURE — 99024 POSTOP FOLLOW-UP VISIT: CPT | Performed by: PODIATRIST

## 2020-12-17 NOTE — PROGRESS NOTES
David Rios is a 62year old female. Patient presents with:  Post-Op: Present for post op sx 10/7/20 right hallus. Denies any pain, redness, tingling or d/c. Patient states toe looks \"bigger\". Pain scale 0/10. Did not check BS this morning.  [facility] Samples (ACCU-CHEK RADHA PLUS) W/DEVICE Does not apply Kit To use as directed.  (Patient not taking: Reported on 12/17/2020 ) 1 kit 0   • Glucose Blood (ACCU-CHEK RADHA) In Vitro Strip place 1 by Intradermal route  every day        Past Medical History:   Diagnosis Dr. Ivonne May   • YAG CAPSULOTOMY - OS - LEFT EYE Left 1/6/16    OREN      Family History   Problem Relation Age of Onset   • Cancer Father         NG: Esophageal   • Heart Disease Mother         NG:  CAD   • Cancer Mother    • Diabetes Mother    • Breast Ca

## 2021-01-11 ENCOUNTER — OFFICE VISIT (OUTPATIENT)
Dept: PODIATRY CLINIC | Facility: CLINIC | Age: 61
End: 2021-01-11
Payer: COMMERCIAL

## 2021-01-11 DIAGNOSIS — Z98.890 STATUS POST FOOT SURGERY: Primary | ICD-10-CM

## 2021-01-11 PROCEDURE — 99212 OFFICE O/P EST SF 10 MIN: CPT | Performed by: PODIATRIST

## 2021-01-11 NOTE — PROGRESS NOTES
Amy Oliveira is a 62year old female. Patient presents with: Follow - Up: Surgery on right hallux 10//07/20. Denies any drainage. Rates pain 0/10. Denies any fever.          HPI:   Patient returns to clinic for checkup on her right hallux there Cataract 2006    NG: OD   • Cataract 2006    NG:  OS   • Episcleritis 12/14/2009    NG:  OD   • Ganglion 10/22/2013    NG: Right thumb   • Iritis 12/14/2009    NG: OD   • Myopia with astigmatism and presbyopia 2008    NG:  OU   • Ocular hypertension 2008 Neg    • Macular degeneration Neg       Social History    Socioeconomic History      Marital status:       Spouse name: Not on file      Number of children: Not on file      Years of education: Not on file      Highest education level: Not on file

## 2021-02-02 ENCOUNTER — OFFICE VISIT (OUTPATIENT)
Dept: PODIATRY CLINIC | Facility: CLINIC | Age: 61
End: 2021-02-02
Payer: COMMERCIAL

## 2021-02-02 DIAGNOSIS — Z98.890 STATUS POST FOOT SURGERY: Primary | ICD-10-CM

## 2021-02-02 PROCEDURE — 99212 OFFICE O/P EST SF 10 MIN: CPT | Performed by: PODIATRIST

## 2021-02-02 NOTE — PROGRESS NOTES
Maria Victoria Martin is a 62year old female. Patient presents with:  Post-Op: s/p right hallux on 10/7/20- pt denies any pain. She states the area as scabbed over again. pt states on left 5th toe she has a blood blister.          HPI:   Patient returns Cataract 2006    NG:  OS   • Episcleritis 12/14/2009    NG:  OD   • Ganglion 10/22/2013    NG: Right thumb   • Iritis 12/14/2009    NG: OD   • Myopia with astigmatism and presbyopia 2008    NG:  OU   • Ocular hypertension 2008    NG:  OS   • Ophthalmologic Neg       Social History    Socioeconomic History      Marital status:       Spouse name: Not on file      Number of children: Not on file      Years of education: Not on file      Highest education level: Not on file    Tobacco Use      Smoking sta

## 2021-03-15 ENCOUNTER — OFFICE VISIT (OUTPATIENT)
Dept: PODIATRY CLINIC | Facility: CLINIC | Age: 61
End: 2021-03-15
Payer: COMMERCIAL

## 2021-03-15 DIAGNOSIS — L84 CALLUS OF FOOT: Primary | ICD-10-CM

## 2021-03-15 PROCEDURE — 99212 OFFICE O/P EST SF 10 MIN: CPT | Performed by: PODIATRIST

## 2021-03-15 NOTE — PROGRESS NOTES
Livia Austin is a 62year old female. Patient presents with: Follow - Up: right hallux -- Sx on 10/07/20. Rates pain 0/10. Denies any fever.          HPI:   Patient returns to the clinic for checkup on her toe which is very callused on the right Episcleritis 12/14/2009    NG:  OD   • Ganglion 10/22/2013    NG: Right thumb   • Iritis 12/14/2009    NG: OD   • Myopia with astigmatism and presbyopia 2008    NG:  OU   • Ocular hypertension 2008    NG:  OS   • Ophthalmological disorder     NG:  Macula p Socioeconomic History      Marital status:       Spouse name: Not on file      Number of children: Not on file      Years of education: Not on file      Highest education level: Not on file    Tobacco Use      Smoking status: Never Smoker      Smoke thickened. 2. Vascular: The patient again has palpable pulses   3. Neurologic: Patient has diminished pedal sensation   4. Musculoskeletal: The patient has good muscle strength.   Still has mild limited range of motion of the first MTP joint on the right

## 2021-03-16 ENCOUNTER — OFFICE VISIT (OUTPATIENT)
Dept: OPHTHALMOLOGY | Facility: CLINIC | Age: 61
End: 2021-03-16
Payer: COMMERCIAL

## 2021-03-16 DIAGNOSIS — H40.1131 PRIMARY OPEN ANGLE GLAUCOMA OF BOTH EYES, MILD STAGE: Primary | ICD-10-CM

## 2021-03-16 PROCEDURE — 92012 INTRM OPH EXAM EST PATIENT: CPT | Performed by: OPHTHALMOLOGY

## 2021-03-16 RX ORDER — LATANOPROST 50 UG/ML
SOLUTION/ DROPS OPHTHALMIC
Qty: 3 BOTTLE | Refills: 3 | Status: SHIPPED | OUTPATIENT
Start: 2021-03-16

## 2021-03-16 NOTE — ASSESSMENT & PLAN NOTE
Discussed with patient Intraocular pressure stable, tolerating medications. Will continue same regimen of Latanoprost in both eyes at bedtime. Will have patient back in 4 months for a visual field, OCT, diabetic eye exam and photos.

## 2021-03-16 NOTE — PROGRESS NOTES
Marta Antonio is a 62year old female. HPI:     HPI     Here for an IOP check. Pt is taking Latanoprost once a day in both eyes as directed. Pt denies any vision changes and has no ocular complaints.      Last edited by Benito Tate, O.T. on (Phaco with PC IOL/ general/ Vision Blue/ OREN); Yag Capsulotomy - OS - Left Eye (Left, 1/6/16) (OREN); and Yag Capsulotomy - OD - Right Eye (Right, 6/22/2016) (Dr. Holger Monge).     Family History   Problem Relation Age of Onset   • Cancer Father         NG: Es Glucose Blood (ACCU-CHEK RADHA) In Vitro Strip place 1 by Intradermal route  every day         Allergies:  No Known Allergies    ROS:       PHYSICAL EXAM:     Base Eye Exam     Visual Acuity (Snellen - Linear)       Right Left    Dist cc 20/25 +3 20/25 instructions:  Return in about 4 months (around 7/16/2021) for Visual field, OCT, Diabetic eye exam, Photos.     3/16/2021  Scribed by: Kristy Mattson MD

## 2021-04-08 NOTE — TELEPHONE ENCOUNTER
Pt requesting order entered for mammogram.  Please advise. Nurses- pt states ok to leave message on her VM when order entered. normal (ped)...

## 2021-04-12 NOTE — TELEPHONE ENCOUNTER
Called Pt LM in regards to referral for mammogram is in system will need to call to schedule appt Message sent to patient with results via e-advice.

## 2021-04-16 ENCOUNTER — OFFICE VISIT (OUTPATIENT)
Dept: PODIATRY CLINIC | Facility: CLINIC | Age: 61
End: 2021-04-16
Payer: COMMERCIAL

## 2021-04-16 VITALS — HEIGHT: 66 IN | WEIGHT: 160 LBS | BODY MASS INDEX: 25.71 KG/M2

## 2021-04-16 DIAGNOSIS — E11.42 DIABETIC POLYNEUROPATHY ASSOCIATED WITH TYPE 2 DIABETES MELLITUS (HCC): ICD-10-CM

## 2021-04-16 DIAGNOSIS — L84 CALLUS OF FOOT: Primary | ICD-10-CM

## 2021-04-16 DIAGNOSIS — L84 PRE-ULCERATIVE CALLUSES: ICD-10-CM

## 2021-04-16 PROCEDURE — 11055 PARING/CUTG B9 HYPRKER LES 1: CPT | Performed by: PODIATRIST

## 2021-04-16 PROCEDURE — 3008F BODY MASS INDEX DOCD: CPT | Performed by: PODIATRIST

## 2021-04-21 ENCOUNTER — TELEPHONE (OUTPATIENT)
Dept: FAMILY MEDICINE CLINIC | Facility: CLINIC | Age: 61
End: 2021-04-21

## 2021-04-21 NOTE — TELEPHONE ENCOUNTER
Last annual physical 7-18-18  Mammogram 8-      Left detailed message offering to assist patient with scheduling her annual physical (mammogram up to date).      Encouraged to call the office to schedule her annual physical with our Scheduling Depart

## 2021-04-22 NOTE — PROGRESS NOTES
Maria Victoria Martin is a 62year old female. Patient presents with:  Post-Op: s/p/ right hallu,x bone removed on great toedenies pain today         HPI:   Rest of the clinic she is not having any pain.   At today's visit reviewed nurse's history as take presbyopia 2008    NG:  OU   • Ocular hypertension 2008    NG:  OS   • Ophthalmological disorder     NG:  Macula pigmentation changes, OS, 2006   • Type II or unspecified type diabetes mellitus without mention of complication, not stated as uncontrolled status: Never Smoker      Smokeless tobacco: Never Used    Vaping Use      Vaping Use: Never used    Substance and Sexual Activity      Alcohol use: Yes        Comment: NG:  Occasionally       Drug use: No    Other Topics      Concerns:        Caffeine Con

## 2021-04-26 ENCOUNTER — LAB ENCOUNTER (OUTPATIENT)
Dept: LAB | Age: 61
End: 2021-04-26
Attending: FAMILY MEDICINE
Payer: COMMERCIAL

## 2021-04-26 ENCOUNTER — OFFICE VISIT (OUTPATIENT)
Dept: FAMILY MEDICINE CLINIC | Facility: CLINIC | Age: 61
End: 2021-04-26
Payer: COMMERCIAL

## 2021-04-26 VITALS
DIASTOLIC BLOOD PRESSURE: 64 MMHG | SYSTOLIC BLOOD PRESSURE: 120 MMHG | BODY MASS INDEX: 27.64 KG/M2 | HEART RATE: 80 BPM | WEIGHT: 172 LBS | HEIGHT: 66 IN

## 2021-04-26 DIAGNOSIS — Z00.00 ROUTINE GENERAL MEDICAL EXAMINATION AT A HEALTH CARE FACILITY: ICD-10-CM

## 2021-04-26 DIAGNOSIS — M86.9 OSTEOMYELITIS OF RIGHT FOOT, UNSPECIFIED TYPE (HCC): ICD-10-CM

## 2021-04-26 DIAGNOSIS — E11.65 TYPE 2 DIABETES MELLITUS WITH HYPERGLYCEMIA, WITHOUT LONG-TERM CURRENT USE OF INSULIN (HCC): ICD-10-CM

## 2021-04-26 DIAGNOSIS — Z00.00 ROUTINE GENERAL MEDICAL EXAMINATION AT A HEALTH CARE FACILITY: Primary | ICD-10-CM

## 2021-04-26 DIAGNOSIS — Z12.11 COLON CANCER SCREENING: ICD-10-CM

## 2021-04-26 DIAGNOSIS — M25.551 RIGHT HIP PAIN: ICD-10-CM

## 2021-04-26 PROCEDURE — 85025 COMPLETE CBC W/AUTO DIFF WBC: CPT

## 2021-04-26 PROCEDURE — 3078F DIAST BP <80 MM HG: CPT | Performed by: FAMILY MEDICINE

## 2021-04-26 PROCEDURE — 99396 PREV VISIT EST AGE 40-64: CPT | Performed by: FAMILY MEDICINE

## 2021-04-26 PROCEDURE — 36415 COLL VENOUS BLD VENIPUNCTURE: CPT

## 2021-04-26 PROCEDURE — 3008F BODY MASS INDEX DOCD: CPT | Performed by: FAMILY MEDICINE

## 2021-04-26 PROCEDURE — 3074F SYST BP LT 130 MM HG: CPT | Performed by: FAMILY MEDICINE

## 2021-04-26 NOTE — PROGRESS NOTES
HPI/Subjective:   Patient ID: David Rios is a 62year old female.     HPI  Patient presents with:  Routine Physical: annual physical, pt states unsure of dosage of invokana, seen takes 300 mg of invokana    Past Medical History:   Diagnosis Yossi EVENING 3 Bottle 3   • Canagliflozin (INVOKANA) 300 MG Oral Tab Take 300 mg by mouth every morning before breakfast. 90 tablet 1   • glimepiride 4 MG Oral Tab Take 1 tablet (4 mg total) by mouth every morning before breakfast. 90 tablet 1   • metFORMIN HCl Right upper body: No supraclavicular adenopathy. Left upper body: No supraclavicular adenopathy. Skin:     Comments: No suspicious findings waist up exam   Neurological:      Mental Status: She is alert and oriented to person, place, and time.

## 2021-05-21 ENCOUNTER — TELEPHONE (OUTPATIENT)
Dept: ENDOCRINOLOGY CLINIC | Facility: CLINIC | Age: 61
End: 2021-05-21

## 2021-05-21 RX ORDER — LISINOPRIL 5 MG/1
5 TABLET ORAL DAILY
Qty: 90 TABLET | Refills: 0 | Status: SHIPPED | OUTPATIENT
Start: 2021-05-21 | End: 2021-08-19

## 2021-05-21 RX ORDER — GLIMEPIRIDE 4 MG/1
4 TABLET ORAL
Qty: 90 TABLET | Refills: 0 | Status: SHIPPED | OUTPATIENT
Start: 2021-05-21 | End: 2021-08-19

## 2021-05-21 NOTE — TELEPHONE ENCOUNTER
Pt called in to get refill on medication glimepiride 4 mg and lisinopril 5 mg. Pt is running low on the medication.   Please follow up

## 2021-05-24 ENCOUNTER — OFFICE VISIT (OUTPATIENT)
Dept: ENDOCRINOLOGY CLINIC | Facility: CLINIC | Age: 61
End: 2021-05-24
Payer: COMMERCIAL

## 2021-05-24 VITALS
WEIGHT: 169 LBS | SYSTOLIC BLOOD PRESSURE: 119 MMHG | DIASTOLIC BLOOD PRESSURE: 65 MMHG | HEART RATE: 61 BPM | BODY MASS INDEX: 27 KG/M2

## 2021-05-24 DIAGNOSIS — E11.8 CONTROLLED TYPE 2 DIABETES MELLITUS WITH COMPLICATION, WITHOUT LONG-TERM CURRENT USE OF INSULIN (HCC): Primary | ICD-10-CM

## 2021-05-24 PROCEDURE — 82947 ASSAY GLUCOSE BLOOD QUANT: CPT | Performed by: INTERNAL MEDICINE

## 2021-05-24 PROCEDURE — 36416 COLLJ CAPILLARY BLOOD SPEC: CPT | Performed by: INTERNAL MEDICINE

## 2021-05-24 PROCEDURE — 83036 HEMOGLOBIN GLYCOSYLATED A1C: CPT | Performed by: INTERNAL MEDICINE

## 2021-05-24 PROCEDURE — 3044F HG A1C LEVEL LT 7.0%: CPT | Performed by: INTERNAL MEDICINE

## 2021-05-24 PROCEDURE — 3078F DIAST BP <80 MM HG: CPT | Performed by: INTERNAL MEDICINE

## 2021-05-24 PROCEDURE — 3074F SYST BP LT 130 MM HG: CPT | Performed by: INTERNAL MEDICINE

## 2021-05-24 PROCEDURE — 99213 OFFICE O/P EST LOW 20 MIN: CPT | Performed by: INTERNAL MEDICINE

## 2021-05-24 NOTE — PROGRESS NOTES
Name: Amy Oliveira  Date: 5/24/2021    Referring Physician: No ref. provider found    HISTORY OF PRESENT ILLNESS   Amy Oliveira is a 62year old female who presents for diabetes mellitus, diagnosed over 8 years ago.      Prior HbA, C o Kit, Test 1x daily, Disp: 1 kit, Rfl: 0  •  OneTouch UltraSoft Lancets Does not apply Misc, Test 1x daily, Disp: 100 each, Rfl: 1  •  Glucose Blood (ONETOUCH ULTRA) In Vitro Strip, Test 1x daily, Disp: 100 each, Rfl: 1  •  ACCU-CHEK SOFTCLIX LANCETS Does n mention of complication, not stated as uncontrolled     NG:  No retinopathy, OU, 2010   • Type II or unspecified type diabetes mellitus without mention of complication, not stated as uncontrolled     NG:  No retinopathy, OU, 2011   • Type II or unspecified of diabetes  -Discussed complications of diabetes include retinopathy, neuropathy, nephropathy and cardiovascular disease  -Discussed importance of SBGM  -Discussed importance of low CHO diet, recommend 45gm per meal or 135gm per day  -Provided patient edu

## 2021-05-26 ENCOUNTER — TELEPHONE (OUTPATIENT)
Dept: ENDOCRINOLOGY CLINIC | Facility: CLINIC | Age: 61
End: 2021-05-26

## 2021-05-26 NOTE — TELEPHONE ENCOUNTER
•  lisinopril 5 MG Oral Tab, Take 1 tablet (5 mg total) by mouth daily. , Disp: 90 tablet, Rfl: 0    •  glimepiride 4 MG Oral Tab, Take 1 tablet (4 mg total) by mouth every morning before breakfast., Disp: 90 tablet, Rfl: 0

## 2021-05-28 RX ORDER — LISINOPRIL 5 MG/1
5 TABLET ORAL DAILY
Qty: 90 TABLET | Refills: 0 | OUTPATIENT
Start: 2021-05-28

## 2021-05-28 RX ORDER — GLIMEPIRIDE 4 MG/1
4 TABLET ORAL
Qty: 90 TABLET | Refills: 0 | OUTPATIENT
Start: 2021-05-28

## 2021-05-28 NOTE — TELEPHONE ENCOUNTER
LOV: 05/24/21  NOV: 11/29/21  •  lisinopril 5 MG Oral Tab, Take 1 tablet (5 mg total) by mouth daily. , Disp: 90 tablet, Rfl: 0     •  glimepiride 4 MG Oral Tab, Take 1 tablet (4 mg total) by mouth every morning before breakfast., Disp: 90 tablet, Rfl: 0

## 2021-06-28 NOTE — LETTER
April 23, 2019    Yulissa Dias DO  [address] [facility]     Patient: Magui Nick   YOB: 1960   Date of Visit: 4/23/2019       Dear Dr. Rajat Marquis DO:    Thank you for referring Tyrell Cotton to me for evaluation.  He Progress Notes by Mallory Grover RN at 12/16/2019  2:15 PM     Author: Mallory Grover RN Service: -- Author Type: Registered Nurse    Filed: 12/16/2019  2:08 PM Encounter Date: 12/16/2019 Status: Attested    : Mallory Grover RN (Registered Nurse) Cosigner: Gabino Bach MD at 12/16/2019  2:35 PM    Attestation signed by Gabino Bach MD at 12/16/2019  2:35 PM    Anticoagulation care reviewed.  I agree with the plan of care.    Gabino Bahc MD  [facility]  12/16/2019, 2:35 PM                 ANTICOAGULATION  MANAGEMENT    Assessment     Today's INR result of 2.2 is Subtherapeutic (goal INR of 2.5-3.5)        Warfarin taken as previously instructed    No new diet changes affecting INR    No new medication/supplements affecting INR    Continues to tolerate warfarin with no reported s/s of bleeding or thromboembolism     Previous INR was Therapeutic    Plan:     Met with Bernadette in clinic regarding INR result and instructed:     Warfarin Dosing Instructions:  Change warfarin dose to 5 mg daily  (7.7 % change)    Instructed patient to follow up no later than: 2 weeks at     Education provided: target INR goal and significance of current INR result, importance of following up for INR monitoring at instructed interval, importance of taking warfarin as instructed and written instructions provided    Bernadette verbalizes understanding and agrees to warfarin dosing plan.    Instructed to call the [facility] for any changes, questions or concerns. (#[phone])   ?   Mallory Grover RN    Subjective/Objective:      Bernadette Yu, a 82 y.o. female is on warfarin.     Bernadette reports:     Home warfarin dose: verbally confirmed home dose with Bernadette and updated on anticoagulation calendar     Missed doses: No     Medication changes:  No     S/S of bleeding or thromboembolism:  No     New Injury or illness:  No     Changes in diet or alcohol consumption:  No     Upcoming  NG:  No retinopathy, OU, 2011   • Type II or unspecified type diabetes mellitus without mention of complication, not stated as uncontrolled     NG: No retinopathy, OU, 2014       Surgical History: More Lunar has a past surgical history that include surgery, procedure or cardioversion:  No    Anticoagulation Episode Summary     Current INR goal:   2.5-3.5   TTR:   43.8 % (11.9 mo)   Next INR check:   12/30/2019   INR from last check:   2.20! (12/16/2019)   Weekly max warfarin dose:      Target end date:      INR check location:      Preferred lab:      Send INR reminders to:   [facility]    Indications    S/P mitral valve replacement (MVR) [Z95.2]           Comments:            Anticoagulation Care Providers     Provider Role Specialty Phone number    Gabino Bach MD Referring Internal Medicine [phone]                apply Kit To use as directed.  Disp: 1 kit Rfl: 0   Glucose Blood (ACCU-CHEK RADHA) In Vitro Strip place 1 by Intradermal route  every day Disp:  Rfl:        Allergies:  No Known Allergies    ROS:       PHYSICAL EXAM:     Base Eye Exam     Visual Acuity (Sn medications. Will continue same regimen of Latanoprost in both eyes at bedtime. Photos were taken today to document optic nerves. Will have patient back for next available visual field, OCT with no MD, then in 4 months for an IOP.      Diabetes ranjith

## 2021-07-15 ENCOUNTER — OFFICE VISIT (OUTPATIENT)
Dept: OPHTHALMOLOGY | Facility: CLINIC | Age: 61
End: 2021-07-15
Payer: COMMERCIAL

## 2021-07-15 DIAGNOSIS — H43.393 VITREOUS FLOATERS OF BOTH EYES: ICD-10-CM

## 2021-07-15 DIAGNOSIS — Z96.1 PSEUDOPHAKIA OF BOTH EYES: ICD-10-CM

## 2021-07-15 DIAGNOSIS — H40.1131 PRIMARY OPEN ANGLE GLAUCOMA OF BOTH EYES, MILD STAGE: Primary | ICD-10-CM

## 2021-07-15 DIAGNOSIS — E11.9 DIABETES MELLITUS TYPE 2 WITHOUT RETINOPATHY (HCC): ICD-10-CM

## 2021-07-15 PROCEDURE — 92133 CPTRZD OPH DX IMG PST SGM ON: CPT | Performed by: OPHTHALMOLOGY

## 2021-07-15 PROCEDURE — 92015 DETERMINE REFRACTIVE STATE: CPT | Performed by: OPHTHALMOLOGY

## 2021-07-15 PROCEDURE — 92083 EXTENDED VISUAL FIELD XM: CPT | Performed by: OPHTHALMOLOGY

## 2021-07-15 PROCEDURE — 92014 COMPRE OPH EXAM EST PT 1/>: CPT | Performed by: OPHTHALMOLOGY

## 2021-07-15 PROCEDURE — 92250 FUNDUS PHOTOGRAPHY W/I&R: CPT | Performed by: OPHTHALMOLOGY

## 2021-07-15 NOTE — PATIENT INSTRUCTIONS
Primary open angle glaucoma of both eyes, mild stage  Visual field and OCT completed in office today with stable results that were discussed with patient in office today. Photos were taken today to document optic nerves.      Discussed with patient Intra

## 2021-07-15 NOTE — PROGRESS NOTES
Kay Nunez is a 62year old female.     HPI:     HPI     Diabetic Eye Exam      Additional comments: Pt has been a diabetic for 10+ years       Pt's diabetes is currently controlled by pills   Pt checks BS does not check at home   Pt's last blo ; other surgical history (NG: Bunion/hammer toes surgery ); other surgical history (NG:  Excision ganglion, right thumb, 10/22/2013 - Cindy Michel );  Cataract extraction w/  intraocular lens implant (Right, 14) ( Phaco w/ PC IOL/general daily 100 each 1   • ACCU-CHEK SOFTCLIX LANCETS Does not apply Misc   0   • Blood Glucose Monitoring Suppl (ACCU-CHEK RADHA PLUS) W/DEVICE Does not apply Kit To use as directed.  1 kit 0   • Glucose Blood (ACCU-CHEK RADHA) In Vitro Strip place 1 by [employer] +2.50 010 20/20- +2.25 20/20    Left -2.00 +3.00 155 20/20- +2.25 20/20          Final Rx       Sphere [facility]yl[facility] [place] [facility] [place]    Right -2.00 +2.50 010 20/20- +2.25 20/20    Left -2.00 +3.00 155 20/20- +2.25 20/20    Type: Progressive bifocal

## 2021-07-15 NOTE — ASSESSMENT & PLAN NOTE
Visual field and OCT completed in office today with stable results that were discussed with patient in office today. Photos were taken today to document optic nerves. Discussed with patient Intraocular pressure stable, tolerating medications.   Will

## 2021-08-16 NOTE — TELEPHONE ENCOUNTER
Pharmacy refill request for:     •  metFORMIN HCl  MG Oral Tablet 24 Hr, Take 2 tablets (1,000 mg total) by mouth 2 (two) times daily with meals. , Disp: 360 tablet, Rfl: 1

## 2021-08-19 RX ORDER — METFORMIN HYDROCHLORIDE 500 MG/1
1000 TABLET, EXTENDED RELEASE ORAL 2 TIMES DAILY WITH MEALS
Qty: 360 TABLET | Refills: 0 | Status: SHIPPED | OUTPATIENT
Start: 2021-08-19 | End: 2021-11-29

## 2021-08-19 RX ORDER — LISINOPRIL 5 MG/1
5 TABLET ORAL DAILY
Qty: 90 TABLET | Refills: 0 | Status: SHIPPED | OUTPATIENT
Start: 2021-08-19 | End: 2021-11-16

## 2021-08-19 RX ORDER — GLIMEPIRIDE 4 MG/1
4 TABLET ORAL
Qty: 90 TABLET | Refills: 0 | Status: SHIPPED | OUTPATIENT
Start: 2021-08-19 | End: 2021-11-16

## 2021-08-20 ENCOUNTER — OFFICE VISIT (OUTPATIENT)
Dept: PODIATRY CLINIC | Facility: CLINIC | Age: 61
End: 2021-08-20
Payer: COMMERCIAL

## 2021-08-20 DIAGNOSIS — L84 PRE-ULCERATIVE CALLUSES: ICD-10-CM

## 2021-08-20 DIAGNOSIS — M79.674 PAIN OF TOE OF RIGHT FOOT: ICD-10-CM

## 2021-08-20 DIAGNOSIS — E11.42 DIABETIC POLYNEUROPATHY ASSOCIATED WITH TYPE 2 DIABETES MELLITUS (HCC): ICD-10-CM

## 2021-08-20 DIAGNOSIS — B35.1 ONYCHOMYCOSIS: ICD-10-CM

## 2021-08-20 DIAGNOSIS — L84 CALLUS OF FOOT: Primary | ICD-10-CM

## 2021-08-20 PROCEDURE — 11056 PARNG/CUTG B9 HYPRKR LES 2-4: CPT | Performed by: PODIATRIST

## 2021-08-20 NOTE — PROGRESS NOTES
Jordy Krishna is a 62year old female. Patient presents with: Follow - Up: Right foot---callus. Patient denies any pain to foot. HPI:   Rest of the clinic she is not having any pain.   But has concerns because the calluses are building up route  every day (Patient not taking: Reported on 8/20/2021 )        Past Medical History:   Diagnosis Date   • Acute iritis 9/26/2008    NG:  OD   • After-cataract of left eye with vision obscured 12/22/2015   • Cataract 2006    NG: OD   • Cataract 2006 Esophageal   • Heart Disease Mother         NG:  CAD   • Cancer Mother    • Diabetes Mother    • Breast Cancer Cousin    • Ovarian Cancer Paternal Grandmother    • Ovarian Cancer Paternal Cousin Female    • Glaucoma Neg    • Macular degeneration Neg foot    Diabetic polyneuropathy associated with type 2 diabetes mellitus (HCC)    Onychomycosis    Pain of toe of right foot    Pre-ulcerative calluses        Plan: Today trimmed down debrided the area with a 15 blade. Trimmed also the left hallux.   These

## 2021-08-26 ENCOUNTER — ORDER TRANSCRIPTION (OUTPATIENT)
Dept: ADMINISTRATIVE | Facility: HOSPITAL | Age: 61
End: 2021-08-26

## 2021-08-26 DIAGNOSIS — Z12.31 ENCOUNTER FOR SCREENING MAMMOGRAM FOR MALIGNANT NEOPLASM OF BREAST: Primary | ICD-10-CM

## 2021-08-30 NOTE — TELEPHONE ENCOUNTER
Received fax for XR refill      Canagliflozin (INVOKANA) 300 MG Oral Tab, Take 300 mg by mouth every morning before breakfast., Disp: 90 tablet, Rfl: 1

## 2021-08-31 RX ORDER — CANAGLIFLOZIN 300 MG/1
300 TABLET, FILM COATED ORAL
Qty: 90 TABLET | Refills: 1 | Status: SHIPPED | OUTPATIENT
Start: 2021-08-31 | End: 2021-11-29

## 2021-09-22 ENCOUNTER — TELEPHONE (OUTPATIENT)
Dept: FAMILY MEDICINE CLINIC | Facility: CLINIC | Age: 61
End: 2021-09-22

## 2021-09-22 DIAGNOSIS — Z12.31 BREAST CANCER SCREENING BY MAMMOGRAM: Primary | ICD-10-CM

## 2021-09-22 DIAGNOSIS — Z12.31 ENCOUNTER FOR SCREENING MAMMOGRAM FOR MALIGNANT NEOPLASM OF BREAST: ICD-10-CM

## 2021-09-22 NOTE — TELEPHONE ENCOUNTER
Patient tried to schedule mammogram at [address] [place] [address] but was told there was no order on file. Please call back when order is placed.

## 2021-09-23 NOTE — TELEPHONE ENCOUNTER
Dr. Gage Gravely, patient is requesting a mammogram order. Last mammogram was completed 08/21/2020. Please advise on order.      Impression   CONCLUSION:         BI-RADS CATEGORY:     DIAGNOSTIC CATEGORY 2--BENIGN FINDING NO CHANGE FROM COMPARISON ASSESSMENT.

## 2021-10-28 ENCOUNTER — HOSPITAL ENCOUNTER (OUTPATIENT)
Dept: MAMMOGRAPHY | Age: 61
Discharge: HOME | End: 2021-10-28
Attending: FAMILY MEDICINE
Payer: COMMERCIAL

## 2021-10-28 DIAGNOSIS — Z12.31 ENCOUNTER FOR SCREENING MAMMOGRAM FOR MALIGNANT NEOPLASM OF BREAST: ICD-10-CM

## 2021-10-28 PROCEDURE — 77063 BREAST TOMOSYNTHESIS BI: CPT | Performed by: FAMILY MEDICINE

## 2021-10-28 PROCEDURE — 77067 SCR MAMMO BI INCL CAD: CPT | Performed by: FAMILY MEDICINE

## 2021-11-05 ENCOUNTER — HOSPITAL ENCOUNTER (OUTPATIENT)
Dept: ULTRASOUND IMAGING | Facility: HOSPITAL | Age: 61
Discharge: HOME | End: 2021-11-05
Attending: FAMILY MEDICINE
Payer: COMMERCIAL

## 2021-11-05 ENCOUNTER — HOSPITAL ENCOUNTER (OUTPATIENT)
Dept: MAMMOGRAPHY | Facility: HOSPITAL | Age: 61
Discharge: HOME | End: 2021-11-05
Attending: FAMILY MEDICINE
Payer: COMMERCIAL

## 2021-11-05 DIAGNOSIS — R92.8 ABNORMAL MAMMOGRAM: ICD-10-CM

## 2021-11-05 PROCEDURE — 77061 BREAST TOMOSYNTHESIS UNI: CPT | Performed by: FAMILY MEDICINE

## 2021-11-05 PROCEDURE — 77065 DX MAMMO INCL CAD UNI: CPT | Performed by: FAMILY MEDICINE

## 2021-11-05 PROCEDURE — 76642 ULTRASOUND BREAST LIMITED: CPT | Performed by: FAMILY MEDICINE

## 2021-11-15 ENCOUNTER — TELEPHONE (OUTPATIENT)
Dept: FAMILY MEDICINE CLINIC | Facility: CLINIC | Age: 61
End: 2021-11-15

## 2021-11-15 NOTE — TELEPHONE ENCOUNTER
Verified name and . Patient calling to follow up on mammogram and breast ultrasound results from 21. Upon chart review, no result notes from Dr. Geanie Cooks at this time. Please advise and thank you.

## 2021-11-16 DIAGNOSIS — N63.10 BREAST MASS, RIGHT: Primary | ICD-10-CM

## 2021-11-16 RX ORDER — GLIMEPIRIDE 4 MG/1
4 TABLET ORAL
Qty: 90 TABLET | Refills: 0 | Status: SHIPPED | OUTPATIENT
Start: 2021-11-16

## 2021-11-16 RX ORDER — LISINOPRIL 5 MG/1
5 TABLET ORAL DAILY
Qty: 90 TABLET | Refills: 0 | Status: SHIPPED | OUTPATIENT
Start: 2021-11-16

## 2021-11-18 ENCOUNTER — OFFICE VISIT (OUTPATIENT)
Dept: OPHTHALMOLOGY | Facility: CLINIC | Age: 61
End: 2021-11-18
Payer: COMMERCIAL

## 2021-11-18 DIAGNOSIS — H40.1131 PRIMARY OPEN ANGLE GLAUCOMA OF BOTH EYES, MILD STAGE: Primary | ICD-10-CM

## 2021-11-18 PROCEDURE — 99213 OFFICE O/P EST LOW 20 MIN: CPT | Performed by: OPHTHALMOLOGY

## 2021-11-18 NOTE — PATIENT INSTRUCTIONS
Primary open angle glaucoma of both eyes, mild stage  Discussed with patient Intraocular pressure stable, tolerating medications. Will continue same regimen of Latanoprost in both eyes every morning.      Encouraged patient to take her eye drops faithfully

## 2021-11-18 NOTE — ASSESSMENT & PLAN NOTE
Discussed with patient Intraocular pressure stable, tolerating medications. Will continue same regimen of Latanoprost in both eyes every morning. Encouraged patient to take her eye drops faithfully every night.   Patient decides that it would be easier

## 2021-11-29 ENCOUNTER — OFFICE VISIT (OUTPATIENT)
Dept: ENDOCRINOLOGY CLINIC | Facility: CLINIC | Age: 61
End: 2021-11-29
Payer: COMMERCIAL

## 2021-11-29 VITALS
HEART RATE: 59 BPM | SYSTOLIC BLOOD PRESSURE: 115 MMHG | WEIGHT: 172 LBS | DIASTOLIC BLOOD PRESSURE: 69 MMHG | BODY MASS INDEX: 28 KG/M2

## 2021-11-29 DIAGNOSIS — E11.8 CONTROLLED TYPE 2 DIABETES MELLITUS WITH COMPLICATION, WITHOUT LONG-TERM CURRENT USE OF INSULIN (HCC): Primary | ICD-10-CM

## 2021-11-29 PROCEDURE — 82947 ASSAY GLUCOSE BLOOD QUANT: CPT | Performed by: INTERNAL MEDICINE

## 2021-11-29 PROCEDURE — 36416 COLLJ CAPILLARY BLOOD SPEC: CPT | Performed by: INTERNAL MEDICINE

## 2021-11-29 PROCEDURE — 3074F SYST BP LT 130 MM HG: CPT | Performed by: INTERNAL MEDICINE

## 2021-11-29 PROCEDURE — 99214 OFFICE O/P EST MOD 30 MIN: CPT | Performed by: INTERNAL MEDICINE

## 2021-11-29 PROCEDURE — 3078F DIAST BP <80 MM HG: CPT | Performed by: INTERNAL MEDICINE

## 2021-11-29 RX ORDER — CANAGLIFLOZIN 300 MG/1
300 TABLET, FILM COATED ORAL
Qty: 90 TABLET | Refills: 1 | Status: SHIPPED | OUTPATIENT
Start: 2021-11-29

## 2021-11-29 RX ORDER — METFORMIN HYDROCHLORIDE 500 MG/1
1000 TABLET, EXTENDED RELEASE ORAL 2 TIMES DAILY WITH MEALS
Qty: 360 TABLET | Refills: 1 | Status: SHIPPED | OUTPATIENT
Start: 2021-11-29

## 2021-11-29 NOTE — PROGRESS NOTES
Name: Tye Gonzales  Date: 11/29/2021    Referring Physician: No ref. provider found    HISTORY OF PRESENT ILLNESS   Tye Gonzales is a 62year old female who presents for diabetes mellitus, diagnosed over 8 years ago.      Prior HbA, C Kit, Test 1x daily (Patient not taking: Reported on 8/20/2021 ), Disp: 1 kit, Rfl: 0  •  OneTouch UltraSoft Lancets Does not apply Misc, Test 1x daily, Disp: 100 each, Rfl: 1  •  Glucose Blood (ONETOUCH ULTRA) In Vitro Strip, Test 1x daily, Disp: 100 each, OU, 2010   • Type II or unspecified type diabetes mellitus without mention of complication, not stated as uncontrolled     NG:  No retinopathy, OU, 2011   • Type II or unspecified type diabetes mellitus without mention of complication, not stated as uncont neuropathy, nephropathy and cardiovascular disease  -Discussed importance of SBGM  -Discussed importance of low CHO diet, recommend 45gm per meal or 135gm per day  -Continue Metformin ER 1000mg PO BID  -Continue GLimepiride 4mg pO daily  -Continue Invokana

## 2021-12-03 ENCOUNTER — LAB ENCOUNTER (OUTPATIENT)
Dept: LAB | Age: 61
End: 2021-12-03
Attending: INTERNAL MEDICINE
Payer: COMMERCIAL

## 2021-12-03 DIAGNOSIS — E11.8 CONTROLLED TYPE 2 DIABETES MELLITUS WITH COMPLICATION, WITHOUT LONG-TERM CURRENT USE OF INSULIN (HCC): ICD-10-CM

## 2021-12-03 PROCEDURE — 36415 COLL VENOUS BLD VENIPUNCTURE: CPT

## 2021-12-03 PROCEDURE — 83036 HEMOGLOBIN GLYCOSYLATED A1C: CPT

## 2021-12-03 PROCEDURE — 82570 ASSAY OF URINE CREATININE: CPT

## 2021-12-03 PROCEDURE — 84443 ASSAY THYROID STIM HORMONE: CPT

## 2021-12-03 PROCEDURE — 80061 LIPID PANEL: CPT

## 2021-12-03 PROCEDURE — 80053 COMPREHEN METABOLIC PANEL: CPT

## 2021-12-03 PROCEDURE — 82043 UR ALBUMIN QUANTITATIVE: CPT

## 2021-12-06 ENCOUNTER — TELEPHONE (OUTPATIENT)
Dept: ENDOCRINOLOGY CLINIC | Facility: CLINIC | Age: 61
End: 2021-12-06

## 2021-12-06 NOTE — TELEPHONE ENCOUNTER
Please call patient - good news, lab results are at goal.  Normal kidney function, liver function is stable. Cholesterol levels are at goal.  Normal urine testing and normal thyroid function. Please continue current medications. Thanks. Number Of Freeze-Thaw Cycles: 1 freeze-thaw cycle Duration Of Freeze Thaw-Cycle (Seconds): 3 Detail Level: Simple Consent: The patient's consent was obtained including but not limited to risks of crusting, scabbing, blistering, scarring, darker or lighter pigmentary change, recurrence, incomplete removal and infection. Render Post-Care Instructions In Note?: no Post-Care Instructions: I reviewed with the patient in detail post-care instructions. Patient is to wear sunprotection, and avoid picking at any of the treated lesions. Pt may apply Vaseline to crusted or scabbing areas.

## 2021-12-07 ENCOUNTER — IMMUNIZATION (OUTPATIENT)
Dept: FAMILY MEDICINE CLINIC | Facility: CLINIC | Age: 61
End: 2021-12-07
Payer: COMMERCIAL

## 2021-12-07 DIAGNOSIS — Z23 NEED FOR VACCINATION: Primary | ICD-10-CM

## 2021-12-07 PROCEDURE — 90471 IMMUNIZATION ADMIN: CPT | Performed by: FAMILY MEDICINE

## 2021-12-07 PROCEDURE — 90686 IIV4 VACC NO PRSV 0.5 ML IM: CPT | Performed by: FAMILY MEDICINE

## 2021-12-19 ENCOUNTER — TELEPHONE (OUTPATIENT)
Dept: FAMILY MEDICINE CLINIC | Facility: CLINIC | Age: 61
End: 2021-12-19

## 2021-12-19 NOTE — TELEPHONE ENCOUNTER
Patient is no longer mychart active. Patient Review of Clinical Information      Problems    The patient or proxy has not reviewed this information.            Medications    The patient or proxy has not reviewed this information, and there are updates

## 2022-02-12 RX ORDER — LISINOPRIL 5 MG/1
5 TABLET ORAL DAILY
Qty: 90 TABLET | Refills: 0 | Status: SHIPPED | OUTPATIENT
Start: 2022-02-12

## 2022-02-12 RX ORDER — GLIMEPIRIDE 4 MG/1
4 TABLET ORAL
Qty: 90 TABLET | Refills: 0 | Status: SHIPPED | OUTPATIENT
Start: 2022-02-12

## 2022-02-25 ENCOUNTER — TELEPHONE (OUTPATIENT)
Dept: ENDOCRINOLOGY CLINIC | Facility: CLINIC | Age: 62
End: 2022-02-25

## 2022-02-25 NOTE — TELEPHONE ENCOUNTER
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 Per Epic formulary jardiance and farxiga are the preferred brands. She denies trying these two medications in the past.  Please advise which one would be appropriate for her if ok to substitute. Thank you.

## 2022-02-25 NOTE — TELEPHONE ENCOUNTER
Called patient to inquire on message below. Pt was tearful over the phone stating her  is leaving her and needs someone to talk to. CSSR completed and patient denied thinking of harming herself. RN gave her the phone number to [address] [facility] to speak with someone and scheduling an appointment with one of the therapists. Routing to Dr. Niecy Herndon as Darline Masterson.

## 2022-03-11 RX ORDER — LATANOPROST 50 UG/ML
SOLUTION/ DROPS OPHTHALMIC
Qty: 3 EACH | Refills: 3 | Status: SHIPPED | OUTPATIENT
Start: 2022-03-11

## 2022-03-11 NOTE — TELEPHONE ENCOUNTER
LDE: 7/15/21  Last visit: 11/18/21  Due for: IOP   Upcoming visit: 3/22/22 for IOP   Patient is compliant- routed to [facility] for approval.

## 2022-03-22 ENCOUNTER — OFFICE VISIT (OUTPATIENT)
Dept: FAMILY MEDICINE CLINIC | Facility: CLINIC | Age: 62
End: 2022-03-22
Payer: COMMERCIAL

## 2022-03-22 ENCOUNTER — TELEPHONE (OUTPATIENT)
Dept: ORTHOPEDICS CLINIC | Facility: CLINIC | Age: 62
End: 2022-03-22

## 2022-03-22 VITALS
HEIGHT: 66 IN | HEART RATE: 60 BPM | DIASTOLIC BLOOD PRESSURE: 73 MMHG | SYSTOLIC BLOOD PRESSURE: 152 MMHG | BODY MASS INDEX: 27 KG/M2 | WEIGHT: 168 LBS

## 2022-03-22 DIAGNOSIS — J01.00 ACUTE MAXILLARY SINUSITIS, RECURRENCE NOT SPECIFIED: Primary | ICD-10-CM

## 2022-03-22 PROCEDURE — 3077F SYST BP >= 140 MM HG: CPT | Performed by: FAMILY MEDICINE

## 2022-03-22 PROCEDURE — 3008F BODY MASS INDEX DOCD: CPT | Performed by: FAMILY MEDICINE

## 2022-03-22 PROCEDURE — 3078F DIAST BP <80 MM HG: CPT | Performed by: FAMILY MEDICINE

## 2022-03-22 PROCEDURE — 99213 OFFICE O/P EST LOW 20 MIN: CPT | Performed by: FAMILY MEDICINE

## 2022-03-22 RX ORDER — AMOXICILLIN AND CLAVULANATE POTASSIUM 875; 125 MG/1; MG/1
1 TABLET, FILM COATED ORAL 2 TIMES DAILY
Qty: 14 TABLET | Refills: 0 | Status: SHIPPED | OUTPATIENT
Start: 2022-03-22 | End: 2022-03-29

## 2022-03-22 NOTE — TELEPHONE ENCOUNTER
Patient cancelled her appointment today due to having having sore throat and cough. Patient informed next available not until June. Please call at [phone], ok to leave detailed message, thanks.

## 2022-03-29 ENCOUNTER — OFFICE VISIT (OUTPATIENT)
Dept: OPHTHALMOLOGY | Facility: CLINIC | Age: 62
End: 2022-03-29
Payer: COMMERCIAL

## 2022-03-29 DIAGNOSIS — H40.1131 PRIMARY OPEN ANGLE GLAUCOMA OF BOTH EYES, MILD STAGE: Primary | ICD-10-CM

## 2022-03-29 PROCEDURE — 99213 OFFICE O/P EST LOW 20 MIN: CPT | Performed by: OPHTHALMOLOGY

## 2022-03-29 NOTE — ASSESSMENT & PLAN NOTE
Discussed with patient Intraocular pressure is getting higher. Stressed the importance of using Latanoprost in both eyes every morning. Encouraged patient to take her eye drops faithfully everyday. Patient decides that it would be easier for her to take eye drops in the morning. Will have patient back in 4 months for a VF, OCT and DM EE.

## 2022-03-29 NOTE — PATIENT INSTRUCTIONS
Primary open angle glaucoma of both eyes, mild stage  Discussed with patient Intraocular pressure is getting higher. Stressed the importance of using Latanoprost in both eyes every morning. Encouraged patient to take her eye drops faithfully everyday. Patient decides that it would be easier for her to take eye drops in the morning. Will have patient back in 4 months for a VF, OCT and DM EE.

## 2022-04-12 ENCOUNTER — OFFICE VISIT (OUTPATIENT)
Dept: FAMILY MEDICINE CLINIC | Facility: CLINIC | Age: 62
End: 2022-04-12
Payer: COMMERCIAL

## 2022-04-12 VITALS
BODY MASS INDEX: 26.68 KG/M2 | SYSTOLIC BLOOD PRESSURE: 106 MMHG | DIASTOLIC BLOOD PRESSURE: 60 MMHG | HEART RATE: 65 BPM | WEIGHT: 166 LBS | HEIGHT: 66 IN

## 2022-04-12 DIAGNOSIS — L98.9 FACE LESION: ICD-10-CM

## 2022-04-12 DIAGNOSIS — Z12.83 SKIN EXAM, SCREENING FOR CANCER: ICD-10-CM

## 2022-04-12 DIAGNOSIS — M79.644 PAIN OF RIGHT THUMB: Primary | ICD-10-CM

## 2022-04-12 PROCEDURE — 3078F DIAST BP <80 MM HG: CPT

## 2022-04-12 PROCEDURE — 3074F SYST BP LT 130 MM HG: CPT

## 2022-04-12 PROCEDURE — 99213 OFFICE O/P EST LOW 20 MIN: CPT

## 2022-04-12 PROCEDURE — 3008F BODY MASS INDEX DOCD: CPT

## 2022-04-12 RX ORDER — CEPHALEXIN 250 MG/1
250 CAPSULE ORAL 2 TIMES DAILY
Qty: 10 CAPSULE | Refills: 0 | Status: SHIPPED | OUTPATIENT
Start: 2022-04-12 | End: 2022-04-17

## 2022-04-13 ENCOUNTER — TELEPHONE (OUTPATIENT)
Dept: FAMILY MEDICINE CLINIC | Facility: CLINIC | Age: 62
End: 2022-04-13

## 2022-04-13 NOTE — TELEPHONE ENCOUNTER
Patient states she was advised to call back and report which antibiotic she is allergic to: Ceftriaxone. She received the dose via IV and her reaction was a rash.       Ceftriaxone

## 2022-04-14 PROBLEM — Z12.83 SKIN EXAM, SCREENING FOR CANCER: Status: ACTIVE | Noted: 2022-04-14

## 2022-04-27 ENCOUNTER — TELEPHONE (OUTPATIENT)
Dept: FAMILY MEDICINE CLINIC | Facility: CLINIC | Age: 62
End: 2022-04-27

## 2022-04-27 NOTE — TELEPHONE ENCOUNTER
Patient calling, identified name and , sates she received a text that her appt for toda had to be canceled     Rescheduled for tomorrow    Future Appointments   Date Time Provider Zara Johanny   2022  3:00 PM Amparo Bach MD [facility]   2022  1:30 PM  [facility] [address]  [facility]   2022  2:15 PM Lele Izaguirre MD [address] [place] [address]   2022 10:00 AM CATRACHITA Chaney [facility]   2022  4:00 PM Jazmín Banegas MD [facility] [address]   2022  3:00 PM Woo López MD [facility]

## 2022-04-28 ENCOUNTER — OFFICE VISIT (OUTPATIENT)
Dept: FAMILY MEDICINE CLINIC | Facility: CLINIC | Age: 62
End: 2022-04-28
Payer: COMMERCIAL

## 2022-04-28 VITALS
DIASTOLIC BLOOD PRESSURE: 62 MMHG | WEIGHT: 166 LBS | HEIGHT: 66 IN | SYSTOLIC BLOOD PRESSURE: 117 MMHG | BODY MASS INDEX: 26.68 KG/M2 | RESPIRATION RATE: 16 BRPM | HEART RATE: 61 BPM

## 2022-04-28 DIAGNOSIS — M79.644 PAIN OF RIGHT THUMB: Primary | ICD-10-CM

## 2022-04-28 PROCEDURE — 3008F BODY MASS INDEX DOCD: CPT | Performed by: STUDENT IN AN ORGANIZED HEALTH CARE EDUCATION/TRAINING PROGRAM

## 2022-04-28 PROCEDURE — 3074F SYST BP LT 130 MM HG: CPT | Performed by: STUDENT IN AN ORGANIZED HEALTH CARE EDUCATION/TRAINING PROGRAM

## 2022-04-28 PROCEDURE — 3078F DIAST BP <80 MM HG: CPT | Performed by: STUDENT IN AN ORGANIZED HEALTH CARE EDUCATION/TRAINING PROGRAM

## 2022-04-28 PROCEDURE — 99213 OFFICE O/P EST LOW 20 MIN: CPT | Performed by: STUDENT IN AN ORGANIZED HEALTH CARE EDUCATION/TRAINING PROGRAM

## 2022-05-06 ENCOUNTER — HOSPITAL ENCOUNTER (OUTPATIENT)
Dept: ULTRASOUND IMAGING | Facility: HOSPITAL | Age: 62
Discharge: HOME | End: 2022-05-06
Attending: FAMILY MEDICINE
Payer: COMMERCIAL

## 2022-05-06 DIAGNOSIS — N63.10 BREAST MASS, RIGHT: ICD-10-CM

## 2022-05-06 PROCEDURE — 76642 ULTRASOUND BREAST LIMITED: CPT | Performed by: FAMILY MEDICINE

## 2022-05-11 ENCOUNTER — OFFICE VISIT (OUTPATIENT)
Dept: FAMILY MEDICINE CLINIC | Facility: CLINIC | Age: 62
End: 2022-05-11
Payer: COMMERCIAL

## 2022-05-11 ENCOUNTER — HOSPITAL ENCOUNTER (OUTPATIENT)
Dept: GENERAL RADIOLOGY | Age: 62
Discharge: HOME | End: 2022-05-11
Payer: COMMERCIAL

## 2022-05-11 VITALS
HEART RATE: 85 BPM | HEIGHT: 66 IN | BODY MASS INDEX: 26.42 KG/M2 | DIASTOLIC BLOOD PRESSURE: 61 MMHG | SYSTOLIC BLOOD PRESSURE: 120 MMHG | WEIGHT: 164.38 LBS

## 2022-05-11 DIAGNOSIS — S89.91XA INJURY OF RIGHT KNEE, INITIAL ENCOUNTER: ICD-10-CM

## 2022-05-11 DIAGNOSIS — S89.91XA INJURY OF RIGHT KNEE, INITIAL ENCOUNTER: Primary | ICD-10-CM

## 2022-05-11 PROCEDURE — 99213 OFFICE O/P EST LOW 20 MIN: CPT

## 2022-05-11 PROCEDURE — 73562 X-RAY EXAM OF KNEE 3: CPT

## 2022-05-11 PROCEDURE — 3008F BODY MASS INDEX DOCD: CPT

## 2022-05-11 PROCEDURE — A6454 SELF-ADHER BAND W>=3" <5"/YD: HCPCS

## 2022-05-11 PROCEDURE — 3078F DIAST BP <80 MM HG: CPT

## 2022-05-11 PROCEDURE — 3074F SYST BP LT 130 MM HG: CPT

## 2022-05-11 RX ORDER — LISINOPRIL 5 MG/1
5 TABLET ORAL DAILY
Qty: 90 TABLET | Refills: 1 | Status: SHIPPED | OUTPATIENT
Start: 2022-05-11

## 2022-05-12 PROBLEM — S89.91XA INJURY OF RIGHT KNEE: Status: ACTIVE | Noted: 2022-05-12

## 2022-05-12 RX ORDER — LISINOPRIL 5 MG/1
5 TABLET ORAL DAILY
Qty: 90 TABLET | Refills: 0 | OUTPATIENT
Start: 2022-05-12

## 2022-05-16 RX ORDER — GLIMEPIRIDE 4 MG/1
4 TABLET ORAL
Qty: 90 TABLET | Refills: 0 | Status: SHIPPED | OUTPATIENT
Start: 2022-05-16

## 2022-05-25 RX ORDER — METFORMIN HYDROCHLORIDE 500 MG/1
1000 TABLET, EXTENDED RELEASE ORAL 2 TIMES DAILY WITH MEALS
Qty: 360 TABLET | Refills: 0 | Status: SHIPPED | OUTPATIENT
Start: 2022-05-25

## 2022-06-02 ENCOUNTER — OFFICE VISIT (OUTPATIENT)
Dept: ENDOCRINOLOGY CLINIC | Facility: CLINIC | Age: 62
End: 2022-06-02
Payer: COMMERCIAL

## 2022-06-02 VITALS
HEART RATE: 58 BPM | DIASTOLIC BLOOD PRESSURE: 77 MMHG | WEIGHT: 166.19 LBS | SYSTOLIC BLOOD PRESSURE: 133 MMHG | BODY MASS INDEX: 27 KG/M2

## 2022-06-02 DIAGNOSIS — E11.8 CONTROLLED TYPE 2 DIABETES MELLITUS WITH COMPLICATION, WITHOUT LONG-TERM CURRENT USE OF INSULIN (HCC): Primary | ICD-10-CM

## 2022-06-02 LAB
CARTRIDGE LOT#: ABNORMAL NUMERIC
GLUCOSE BLOOD: 64
HEMOGLOBIN A1C: 6.9 % (ref 4.3–5.6)
TEST STRIP LOT #: NORMAL NUMERIC

## 2022-06-02 PROCEDURE — 3078F DIAST BP <80 MM HG: CPT | Performed by: INTERNAL MEDICINE

## 2022-06-02 PROCEDURE — 3075F SYST BP GE 130 - 139MM HG: CPT | Performed by: INTERNAL MEDICINE

## 2022-06-02 PROCEDURE — 99213 OFFICE O/P EST LOW 20 MIN: CPT | Performed by: INTERNAL MEDICINE

## 2022-06-02 PROCEDURE — 83036 HEMOGLOBIN GLYCOSYLATED A1C: CPT | Performed by: INTERNAL MEDICINE

## 2022-06-02 PROCEDURE — 3044F HG A1C LEVEL LT 7.0%: CPT | Performed by: INTERNAL MEDICINE

## 2022-06-02 PROCEDURE — 82947 ASSAY GLUCOSE BLOOD QUANT: CPT | Performed by: INTERNAL MEDICINE

## 2022-06-06 NOTE — TELEPHONE ENCOUNTER
Pt requesting to get a refill for Rx for Javier Espinosa, so that she can send it to [facility]) to get a refill. Pt. States that she needs to pick it up from [facility]. Pt states that she ran out of her med this past Sat.

## 2022-07-26 ENCOUNTER — OFFICE VISIT (OUTPATIENT)
Dept: OPHTHALMOLOGY | Facility: CLINIC | Age: 62
End: 2022-07-26
Payer: COMMERCIAL

## 2022-07-26 DIAGNOSIS — E11.9 DIABETES MELLITUS TYPE 2 WITHOUT RETINOPATHY (HCC): Primary | ICD-10-CM

## 2022-07-26 DIAGNOSIS — H40.1131 PRIMARY OPEN ANGLE GLAUCOMA OF BOTH EYES, MILD STAGE: ICD-10-CM

## 2022-07-26 DIAGNOSIS — H43.393 VITREOUS FLOATERS OF BOTH EYES: ICD-10-CM

## 2022-07-26 DIAGNOSIS — Z96.1 PSEUDOPHAKIA OF BOTH EYES: ICD-10-CM

## 2022-07-26 PROCEDURE — 2023F DILAT RTA XM W/O RTNOPTHY: CPT | Performed by: OPHTHALMOLOGY

## 2022-07-26 PROCEDURE — 92133 CPTRZD OPH DX IMG PST SGM ON: CPT | Performed by: OPHTHALMOLOGY

## 2022-07-26 PROCEDURE — 92083 EXTENDED VISUAL FIELD XM: CPT | Performed by: OPHTHALMOLOGY

## 2022-07-26 PROCEDURE — 92014 COMPRE OPH EXAM EST PT 1/>: CPT | Performed by: OPHTHALMOLOGY

## 2022-07-26 NOTE — PATIENT INSTRUCTIONS
Primary open angle glaucoma of both eyes, mild stage  Visual field and OCT completed in office today with stable results that were discussed with patient in office today. Discussed with patient Intraocular pressure stable, tolerating medications. Will continue same regimen of Latanoprost in both eyes at bedtime. Will have patient back in 4 months for a pressure check. Diabetes mellitus type 2 without retinopathy ([facility] [address]  Diabetes type II: no background of retinopathy, no signs of neovascularization noted. Discussed ocular and systemic benefits of blood sugar control. Diagnosis and treatment discussed in detail with patient. Vitreous floaters of both eyes  No treatment. Pseudophakia of both eyes  No treatment. Continue same glasses.

## 2022-08-12 ENCOUNTER — OFFICE VISIT (OUTPATIENT)
Dept: DERMATOLOGY CLINIC | Facility: CLINIC | Age: 62
End: 2022-08-12
Payer: COMMERCIAL

## 2022-08-12 DIAGNOSIS — D23.9 BENIGN NEOPLASM OF SKIN, UNSPECIFIED LOCATION: ICD-10-CM

## 2022-08-12 DIAGNOSIS — L82.1 SK (SEBORRHEIC KERATOSIS): ICD-10-CM

## 2022-08-12 DIAGNOSIS — D22.9 MULTIPLE NEVI: ICD-10-CM

## 2022-08-12 DIAGNOSIS — L72.0 MILIA: ICD-10-CM

## 2022-08-12 DIAGNOSIS — D48.5 NEOPLASM OF UNCERTAIN BEHAVIOR OF SKIN: Primary | ICD-10-CM

## 2022-08-12 PROCEDURE — 88305 TISSUE EXAM BY PATHOLOGIST: CPT | Performed by: DERMATOLOGY

## 2022-08-16 ENCOUNTER — OFFICE VISIT (OUTPATIENT)
Dept: GASTROENTEROLOGY | Facility: CLINIC | Age: 62
End: 2022-08-16
Payer: COMMERCIAL

## 2022-08-16 ENCOUNTER — TELEPHONE (OUTPATIENT)
Dept: GASTROENTEROLOGY | Facility: CLINIC | Age: 62
End: 2022-08-16

## 2022-08-16 VITALS
HEART RATE: 68 BPM | DIASTOLIC BLOOD PRESSURE: 56 MMHG | HEIGHT: 66 IN | BODY MASS INDEX: 27.03 KG/M2 | SYSTOLIC BLOOD PRESSURE: 98 MMHG | WEIGHT: 168.19 LBS

## 2022-08-16 DIAGNOSIS — Z86.010 HISTORY OF COLON POLYPS: Primary | ICD-10-CM

## 2022-08-16 DIAGNOSIS — Z86.010 PERSONAL HISTORY OF COLONIC POLYPS: ICD-10-CM

## 2022-08-16 DIAGNOSIS — R74.01 TRANSAMINITIS: Primary | ICD-10-CM

## 2022-08-16 PROCEDURE — 3074F SYST BP LT 130 MM HG: CPT | Performed by: NURSE PRACTITIONER

## 2022-08-16 PROCEDURE — 3008F BODY MASS INDEX DOCD: CPT | Performed by: NURSE PRACTITIONER

## 2022-08-16 PROCEDURE — 3078F DIAST BP <80 MM HG: CPT | Performed by: NURSE PRACTITIONER

## 2022-08-16 PROCEDURE — 99244 OFF/OP CNSLTJ NEW/EST MOD 40: CPT | Performed by: NURSE PRACTITIONER

## 2022-08-16 RX ORDER — POLYETHYLENE GLYCOL 3350, SODIUM CHLORIDE, SODIUM BICARBONATE, POTASSIUM CHLORIDE 420; 11.2; 5.72; 1.48 G/4L; G/4L; G/4L; G/4L
POWDER, FOR SOLUTION ORAL
Qty: 4000 ML | Refills: 0 | Status: SHIPPED | OUTPATIENT
Start: 2022-08-16

## 2022-08-16 NOTE — TELEPHONE ENCOUNTER
Scheduled for:  Colonoscopy [address]  Provider Name:  Xochitl Moore  Date:  10/26/2022  Location:  [facility]  Sedation:  MAC  Time:  8:30 am, (pt is aware to arrive at 7:30 am)  Prep:  Trilyte  Meds/Allergies Reconciled?: Jessa/NP reviewed. Diagnosis with codes:  History of Colon Polyps Z86.010  Was patient informed to call insurance with codes (Y/N): YES    Referral sent?:  Referral was sent at the time of electronic surgical scheduling. [facility][note 3[facility] or [address] [place] [address] notified?: I sent an electronic request to Endo Scheduling and received a confirmation today. Medication Orders:  [employer], Metformin,Glimepiride day before and day of procedure. Hold Lisinopril morning of procedure. Misc Orders:  N/A     Further instructions given by staff:  I discussed the prep instructions with the patient which she verbally understood. Provided patient with prep instructions.

## 2022-08-16 NOTE — PATIENT INSTRUCTIONS
-labs  -ultrasound  -low-fat diet  -efforts towards healthy BMI  -good hgba1c and cholesterol control with primary care/endocrinology      1. Schedule colonoscopy with mac w/ Dr. Vaibhav Deng [Diagnosis: colon polyps]    2.  bowel prep from pharmacy (split trilyte)    3. Hold farxiga, metformin, glimepiride day before and day of procedure  Hold lisinopril am of procedure    4. Read all bowel prep instructions carefully    5. AVOID seeds, nuts, popcorn, raw fruits and vegetables (cooked is okay) for 2-3 days before procedure    6. You will need to be tested for COVID within 72 hours of your procedure. You will be contacted with instructions on how to do this.       >>>Please note: if you were prescribed Suprep for the bowel prep and it is too expensive or not covered by insurance, it is okay to substitute Trilyte (or any similar generic prep). This can be done by notifying the pharmacy or calling our office.

## 2022-08-17 ENCOUNTER — NURSE TRIAGE (OUTPATIENT)
Dept: FAMILY MEDICINE CLINIC | Facility: CLINIC | Age: 62
End: 2022-08-17

## 2022-08-17 LAB — AMB EXT COVID-19 RESULT: DETECTED

## 2022-08-17 RX ORDER — GLIMEPIRIDE 4 MG/1
4 TABLET ORAL
Qty: 90 TABLET | Refills: 0 | Status: SHIPPED | OUTPATIENT
Start: 2022-08-17 | End: 2022-08-19

## 2022-08-19 RX ORDER — GLIMEPIRIDE 4 MG/1
4 TABLET ORAL
Qty: 90 TABLET | Refills: 0 | Status: SHIPPED | OUTPATIENT
Start: 2022-08-19

## 2022-08-19 NOTE — TELEPHONE ENCOUNTER
LOV: 6/2/22 - seen by Dr. Suggs Handkristen   Date Time Provider Zara Orlando   12/2/2022 10:00 AM Lele Izaguirre MD [address] [place] [address]

## 2022-08-20 NOTE — PROGRESS NOTES
The pathology report from last visit showed right chin Fibrous papule. Please log in test results, send biopsy results letter. Pt to rtc 1 year or prn.

## 2022-08-21 NOTE — PROGRESS NOTES
Operative Report                     Shave/  Tangential biopsy     Clinical diagnosis:    Size of lesion:    Location:pt with persistent lesion on chin , has been irritated, pt tried to peel off and lesion recurred  Spec 1 Description >>>>>: right chin  Spec 1 Comment: r/o BCC vs cyst vs fibrous papule    Procedure: With patient in appropriate position the skin of the above was scrubbed with alcohol. Anesthesia was obtained with 1% Xylocaine with epinephrine. The skin surrounding the lesion was placed under tension and the lesion was incised using a #15 scalpel blade. The specimen was sent for histopathologic exam.    Hemostasis was obtained with electrocautery/aluminum chloride. Estimated blood loss less than 2 cc. Biopsy dressed with Polysporin, bandage. Pressure dressing:   No    Complications: None    Written instructions given and reviewed with patient    Await pathology    Contact information reviewed.     Procedural physician:  Vita Fletcher MD

## 2022-08-22 ENCOUNTER — TELEPHONE (OUTPATIENT)
Dept: FAMILY MEDICINE CLINIC | Facility: CLINIC | Age: 62
End: 2022-08-22

## 2022-08-22 ENCOUNTER — TELEMEDICINE (OUTPATIENT)
Dept: FAMILY MEDICINE CLINIC | Facility: CLINIC | Age: 62
End: 2022-08-22
Payer: COMMERCIAL

## 2022-08-22 DIAGNOSIS — U07.1 COVID-19: Primary | ICD-10-CM

## 2022-08-22 NOTE — TELEPHONE ENCOUNTER
Patient needs return to work form completed. Scheduled the following phone visit to discuss. Patient will fax forms or have  bring to office. Future Appointments   Date Time Provider Zara Orlando   8/22/2022  6:15 PM Melissa Ames DO [facility]   10/26/2022  8:30 AM MARY ELLEN, PROCEDURE [id number] None   12/2/2022 10:00 AM Marcia Jauregui MD [address] [place] [address]   12/6/2022  3:45 PM Germaine Aleman MD [address] [facility]     Patient scheduled for video visit. Patient advised to complete the e-check in in [employer], if active. Understands to follow the prompts and links to complete the visit. Patient advised that there may be a co-pay involved in this type of visit. Patient agreed to proceed, they understand the provider may be calling from a blocked, or unknown phone number on their caller ID and they know to answer the phone.     Best call back:  [phone]

## 2022-08-25 NOTE — TELEPHONE ENCOUNTER
Patient called, verified Name and . Checking on the status of forms, see message below. Ron[address] Staff please follow up.     Best callback number [phone]

## 2022-08-29 ENCOUNTER — MED REC SCAN ONLY (OUTPATIENT)
Dept: FAMILY MEDICINE CLINIC | Facility: CLINIC | Age: 62
End: 2022-08-29

## 2022-10-03 NOTE — TELEPHONE ENCOUNTER
----- Message from [employer] sent at 10/3/2022  3:18 PM EDT -----  Subject: Message to Provider    QUESTIONS  Information for Provider? Patient states she has a bad cough and coughs up   lots phlem and she has to spit out. Tussin is not working. Please call  ---------------------------------------------------------------------------  --------------  Jean MALONEY  [phone]; OK to leave message on voicemail  ---------------------------------------------------------------------------  --------------  SCRIPT ANSWERS  Relationship to Patient?  Self LOV: 11/19/20  RTC: 4 months    RX mailed to patient

## 2022-10-06 ENCOUNTER — TELEPHONE (OUTPATIENT)
Dept: GASTROENTEROLOGY | Facility: CLINIC | Age: 62
End: 2022-10-06

## 2022-10-06 NOTE — TELEPHONE ENCOUNTER
1st overdue reminder letter mailed out to patient      Labs and Imaging order:        Labs   ACTIN (SMOOTH MUSCLE) ANTIBODY   ALPHA-1-ANTITRYPSIN, SERUM   CERULOPLASMIN   FERRITIN   HCV ANTIBODY   HEP A AB, TOTAL   HEP B CORE AB, TOT   HEPATIC FUNCTION PANEL (7)   HEPATITIS B SURFACE ANTIBODY   HEPATITIS B SURFACE ANTIGEN   IMMUNOGLOBULIN A/G/M, QUANT   IRON AND TIBC   MITOCHONDRIAL (M2) ANTIBODY   Less     Imaging   US ABDOMEN COMPLETE (CPT=76700)

## 2022-10-14 ENCOUNTER — TELEPHONE (OUTPATIENT)
Dept: GASTROENTEROLOGY | Facility: CLINIC | Age: 62
End: 2022-10-14

## 2022-10-14 NOTE — TELEPHONE ENCOUNTER
Patient calling again regarding receiving a letter for labs she missed and stating she does not want to have them done at this time. No symptoms at this time.

## 2022-10-14 NOTE — TELEPHONE ENCOUNTER
Patient states she received a letter in the mail stating  she missed her labs. She states she does not want to have them done at this time due to her insurance possible not paying for them. DARION sent to [employer].

## 2022-10-14 NOTE — TELEPHONE ENCOUNTER
Left message to call back. Quality 226: Preventive Care And Screening: Tobacco Use: Screening And Cessation Intervention: Patient screened for tobacco use and is an ex/non-smoker Quality 130: Documentation Of Current Medications In The Medical Record: Current Medications Documented Quality 431: Preventive Care And Screening: Unhealthy Alcohol Use - Screening: Patient not identified as an unhealthy alcohol user when screened for unhealthy alcohol use using a systematic screening method Detail Level: Detailed

## 2022-10-24 ENCOUNTER — TELEPHONE (OUTPATIENT)
Facility: CLINIC | Age: 62
End: 2022-10-24

## 2022-10-24 NOTE — TELEPHONE ENCOUNTER
Patient is scheduled for colonoscopy 10/26/2022. Patient is requesting call back regarding confirmation for prep instructions. Patient is unsure of what she can and cannot eat.

## 2022-10-24 NOTE — TELEPHONE ENCOUNTER
Patient asking if she can eat a banana 2 days prior to her procedure. I advised patient she can eat a banana but should not eat any fruit with seeds,raw vegetables,corn,nuts,seeds or popcorn. Patient voiced understanding.

## 2022-10-25 NOTE — PAT NURSING NOTE
Patient aware to arrive one hour early and [facility] address provided to patient.

## 2022-10-26 ENCOUNTER — HOSPITAL ENCOUNTER (OUTPATIENT)
Age: 62
Setting detail: HOSPITAL OUTPATIENT SURGERY
Discharge: HOME | End: 2022-10-26
Attending: INTERNAL MEDICINE | Admitting: INTERNAL MEDICINE
Payer: COMMERCIAL

## 2022-10-26 ENCOUNTER — ANESTHESIA EVENT (OUTPATIENT)
Dept: ENDOSCOPY | Age: 62
End: 2022-10-26
Payer: COMMERCIAL

## 2022-10-26 ENCOUNTER — ANESTHESIA (OUTPATIENT)
Dept: ENDOSCOPY | Age: 62
End: 2022-10-26
Payer: COMMERCIAL

## 2022-10-26 VITALS
RESPIRATION RATE: 20 BRPM | BODY MASS INDEX: 27 KG/M2 | WEIGHT: 168 LBS | DIASTOLIC BLOOD PRESSURE: 65 MMHG | HEART RATE: 57 BPM | OXYGEN SATURATION: 100 % | HEIGHT: 66 IN | SYSTOLIC BLOOD PRESSURE: 127 MMHG

## 2022-10-26 DIAGNOSIS — Z86.010 HISTORY OF COLON POLYPS: ICD-10-CM

## 2022-10-26 LAB — GLUCOSE BLDC GLUCOMTR-MCNC: 127 MG/DL (ref 70–99)

## 2022-10-26 PROCEDURE — 45380 COLONOSCOPY AND BIOPSY: CPT | Performed by: INTERNAL MEDICINE

## 2022-10-26 PROCEDURE — 99070 SPECIAL SUPPLIES PHYS/QHP: CPT | Performed by: INTERNAL MEDICINE

## 2022-10-26 RX ORDER — SODIUM CHLORIDE, SODIUM LACTATE, POTASSIUM CHLORIDE, CALCIUM CHLORIDE 600; 310; 30; 20 MG/100ML; MG/100ML; MG/100ML; MG/100ML
INJECTION, SOLUTION INTRAVENOUS CONTINUOUS
Status: DISCONTINUED | OUTPATIENT
Start: 2022-10-26 | End: 2022-10-26

## 2022-10-26 NOTE — ANESTHESIA POSTPROCEDURE EVALUATION
Patient: Britt Cruz    Procedure Summary     Date: 10/26/22 Room / Location: [facility] ENDOSCOPY  / [facility] ENDO    Anesthesia Start: 8730 Anesthesia Stop: 8380    Procedure: COLONOSCOPY Diagnosis:       History of colon polyps      (Diverticulosis polyps hemorrhoids)    Surgeons: Eri Mart MD Anesthesiologist:     Anesthesia Type: general ASA Status: 2          Anesthesia Type: general    Vitals Value Taken Time   /57 10/26/22 0847   Temp  10/26/22 0847   Pulse 53 10/26/22 0847   Resp 18 10/26/22 0847   SpO2 100 % 10/26/22 0847       [facility] AN Post Evaluation:   Patient Evaluated in PACU  Patient Participation: complete - patient participated  Level of Consciousness: awake  Pain Score: 0  Pain Management: adequate  Airway Patency:patent  Dental exam unchanged from preop  Yes    Cardiovascular Status: acceptable  Respiratory Status: acceptable  Postoperative Hydration acceptable      Helena Driscoll MD  10/26/2022 8:47 AM

## 2022-10-26 NOTE — DISCHARGE INSTRUCTIONS

## 2022-10-31 NOTE — PROGRESS NOTES
Cristal Saldana is a 62year old female. HPI:     HPI     Pt is here for an IOP check. Pt is taking Latanoprost both eyes at bedtime as directed, pt states she sometimes forgets to use drop, last used on 11/16/21.  Pt states vision is stable, she Patient presents due to possible UTI with burning abdominal pain 5/10, frequency and burning with urination for 5 days. Patient is also concerned about possible strep throat. Patient woke up today with sore throat.     Triage Assessment     Row Name 10/31/22 1047       Triage Assessment (Adult)    Airway WDL WDL       Respiratory WDL    Respiratory WDL WDL       Skin Circulation/Temperature WDL    Skin Circulation/Temperature WDL WDL       Cardiac WDL    Cardiac WDL WDL       Peripheral/Neurovascular WDL    Peripheral Neurovascular WDL WDL       Cognitive/Neuro/Behavioral WDL    Cognitive/Neuro/Behavioral WDL WDL               IOL/general/vision blue/ EDENILSONM); Cataract extraction w/  intraocular lens implant (Left, 6/23/14) (Phaco with PC IOL/ general/ Vision Blue/ OREN);  Yag Capsulotomy - OS - Left Eye (Left, 1/6/16) (OREN); and Yag Capsulotomy - OD - Right Eye (Right, 6/22/2016) (D • Blood Glucose Monitoring Suppl (ACCU-CHEK RADHA PLUS) W/DEVICE Does not apply Kit To use as directed.  (Patient not taking: Reported on 8/20/2021 ) 1 kit 0       Allergies:  No Known Allergies    ROS:       PHYSICAL EXAM:     Base Eye Exam     Visual Ac No prescriptions requested or ordered in this encounter        Follow up instructions:  Return in about 4 months (around 3/18/2022) for IOP check.     11/18/2021  Scribed by: Tavares Garrido MD

## 2022-11-11 ENCOUNTER — HOSPITAL ENCOUNTER (OUTPATIENT)
Dept: MAMMOGRAPHY | Facility: HOSPITAL | Age: 62
Discharge: HOME | End: 2022-11-11
Payer: COMMERCIAL

## 2022-11-11 ENCOUNTER — HOSPITAL ENCOUNTER (OUTPATIENT)
Dept: ULTRASOUND IMAGING | Facility: HOSPITAL | Age: 62
Discharge: HOME | End: 2022-11-11
Payer: COMMERCIAL

## 2022-11-11 DIAGNOSIS — Z12.31 ENCOUNTER FOR SCREENING MAMMOGRAM FOR MALIGNANT NEOPLASM OF BREAST: ICD-10-CM

## 2022-11-11 DIAGNOSIS — R92.8 ABNORMAL MAMMOGRAM: ICD-10-CM

## 2022-11-11 PROCEDURE — 77066 DX MAMMO INCL CAD BI: CPT

## 2022-11-11 PROCEDURE — 76642 ULTRASOUND BREAST LIMITED: CPT

## 2022-11-11 PROCEDURE — 77062 BREAST TOMOSYNTHESIS BI: CPT

## 2022-11-13 NOTE — TELEPHONE ENCOUNTER
LOV: 6/2/22 - seen by Dr. Agusto Cook   Date Time Provider Zara Orlando   12/2/2022 10:00 AM Tanner Montemayor MD [facility]

## 2022-11-14 RX ORDER — GLIMEPIRIDE 4 MG/1
TABLET ORAL
Qty: 90 TABLET | Refills: 0 | Status: SHIPPED | OUTPATIENT
Start: 2022-11-14

## 2022-12-01 ENCOUNTER — TELEPHONE (OUTPATIENT)
Facility: CLINIC | Age: 62
End: 2022-12-01

## 2022-12-01 NOTE — TELEPHONE ENCOUNTER
Results letter mailed to patient per   Colon recall entered for repeat in 3 yrs,10/26/2025  Colon done in 10/26/2022  HM Updated and Patient Outreach was placed for Colon recall

## 2022-12-01 NOTE — TELEPHONE ENCOUNTER
3 year colonoscopy recall entered. Health maintenance updated. Colonoscopy done on 10/26/22 and due on 10/26/25.

## 2022-12-01 NOTE — TELEPHONE ENCOUNTER
----- Message from Willy Meek MD sent at 12/1/2022 10:45 AM CST -----  GI staff: please place recall for colonoscopy in 3 years

## 2022-12-06 ENCOUNTER — OFFICE VISIT (OUTPATIENT)
Dept: OPHTHALMOLOGY | Facility: CLINIC | Age: 62
End: 2022-12-06
Payer: COMMERCIAL

## 2022-12-06 ENCOUNTER — OFFICE VISIT (OUTPATIENT)
Dept: FAMILY MEDICINE CLINIC | Facility: CLINIC | Age: 62
End: 2022-12-06
Payer: COMMERCIAL

## 2022-12-06 VITALS
HEIGHT: 66 IN | BODY MASS INDEX: 26.2 KG/M2 | WEIGHT: 163 LBS | DIASTOLIC BLOOD PRESSURE: 63 MMHG | SYSTOLIC BLOOD PRESSURE: 133 MMHG | HEART RATE: 63 BPM

## 2022-12-06 DIAGNOSIS — L03.032 PARONYCHIA OF GREAT TOE OF LEFT FOOT: ICD-10-CM

## 2022-12-06 DIAGNOSIS — E11.40 TYPE 2 DIABETES MELLITUS WITH DIABETIC NEUROPATHY, WITHOUT LONG-TERM CURRENT USE OF INSULIN (HCC): ICD-10-CM

## 2022-12-06 DIAGNOSIS — L02.612 ABSCESS OF LEFT FOOT: Primary | ICD-10-CM

## 2022-12-06 DIAGNOSIS — H40.1131 PRIMARY OPEN ANGLE GLAUCOMA OF BOTH EYES, MILD STAGE: Primary | ICD-10-CM

## 2022-12-06 PROCEDURE — 3008F BODY MASS INDEX DOCD: CPT | Performed by: FAMILY MEDICINE

## 2022-12-06 PROCEDURE — 99213 OFFICE O/P EST LOW 20 MIN: CPT | Performed by: FAMILY MEDICINE

## 2022-12-06 PROCEDURE — 3075F SYST BP GE 130 - 139MM HG: CPT | Performed by: FAMILY MEDICINE

## 2022-12-06 PROCEDURE — 99213 OFFICE O/P EST LOW 20 MIN: CPT | Performed by: OPHTHALMOLOGY

## 2022-12-06 PROCEDURE — 3078F DIAST BP <80 MM HG: CPT | Performed by: FAMILY MEDICINE

## 2022-12-06 RX ORDER — SULFAMETHOXAZOLE AND TRIMETHOPRIM 800; 160 MG/1; MG/1
1 TABLET ORAL 2 TIMES DAILY
Qty: 14 TABLET | Refills: 0 | Status: SHIPPED | OUTPATIENT
Start: 2022-12-06

## 2022-12-06 NOTE — ASSESSMENT & PLAN NOTE
Discussed with patient Intraocular pressure stable, tolerating medications. Will continue same regimen of Latanoprost in both eyes at bedtime. Told patient that she can take the Latanoprost in the morning or the evening whichever is better for her to take. Stressed importance of taking Latanoprost every day. Will have patient back in 4 months for a pressure check.

## 2022-12-06 NOTE — PATIENT INSTRUCTIONS
Primary open angle glaucoma of both eyes, mild stage  Discussed with patient Intraocular pressure stable, tolerating medications. Will continue same regimen of Latanoprost in both eyes at bedtime. Told patient that she can take the Latanoprost in the morning or the evening whichever is better for her to take. Stressed importance of taking Latanoprost every day. Will have patient back in 4 months for a pressure check.

## 2022-12-09 ENCOUNTER — TELEPHONE (OUTPATIENT)
Dept: FAMILY MEDICINE CLINIC | Facility: CLINIC | Age: 62
End: 2022-12-09

## 2022-12-23 NOTE — TELEPHONE ENCOUNTER
Called and spoke with Dr. Timur Madrid. Dorsum of foot healing well. She will be taking care of the paronychia.

## 2023-01-23 NOTE — TELEPHONE ENCOUNTER
Called patient but no answer. Left detailed message letting patient know RX is ready for . Hours for today and tomorrow provided on voicemail. RX placed in an envelope and put in the  folder at the front.

## 2023-01-23 NOTE — TELEPHONE ENCOUNTER
LOV: 6/2/22    Future Appointments   Date Time Provider Zara Orlando   3/13/2023  3:30 PM Cyndie Llano, MD [facility] Dr. Consuella Blessing -- please see below. RX should print upon signing. Please route back to the pool, so staff can call when it's ready for . Thank you. no

## 2023-01-24 ENCOUNTER — TELEPHONE (OUTPATIENT)
Dept: ENDOCRINOLOGY CLINIC | Facility: CLINIC | Age: 63
End: 2023-01-24

## 2023-02-16 RX ORDER — GLIMEPIRIDE 4 MG/1
TABLET ORAL
Qty: 90 TABLET | Refills: 0 | Status: SHIPPED | OUTPATIENT
Start: 2023-02-16

## 2023-03-13 ENCOUNTER — OFFICE VISIT (OUTPATIENT)
Dept: ENDOCRINOLOGY CLINIC | Facility: CLINIC | Age: 63
End: 2023-03-13

## 2023-03-13 VITALS
SYSTOLIC BLOOD PRESSURE: 136 MMHG | DIASTOLIC BLOOD PRESSURE: 67 MMHG | BODY MASS INDEX: 27 KG/M2 | WEIGHT: 166 LBS | HEART RATE: 60 BPM

## 2023-03-13 DIAGNOSIS — E11.8 CONTROLLED TYPE 2 DIABETES MELLITUS WITH COMPLICATION, WITHOUT LONG-TERM CURRENT USE OF INSULIN (HCC): Primary | ICD-10-CM

## 2023-03-13 LAB
CARTRIDGE LOT#: ABNORMAL NUMERIC
GLUCOSE BLOOD: 170
HEMOGLOBIN A1C: 6.6 % (ref 4.3–5.6)
TEST STRIP LOT #: NORMAL NUMERIC

## 2023-03-13 PROCEDURE — 99214 OFFICE O/P EST MOD 30 MIN: CPT | Performed by: INTERNAL MEDICINE

## 2023-03-13 PROCEDURE — 3075F SYST BP GE 130 - 139MM HG: CPT | Performed by: INTERNAL MEDICINE

## 2023-03-13 PROCEDURE — 83036 HEMOGLOBIN GLYCOSYLATED A1C: CPT | Performed by: INTERNAL MEDICINE

## 2023-03-13 PROCEDURE — 3078F DIAST BP <80 MM HG: CPT | Performed by: INTERNAL MEDICINE

## 2023-03-13 PROCEDURE — 82947 ASSAY GLUCOSE BLOOD QUANT: CPT | Performed by: INTERNAL MEDICINE

## 2023-03-13 PROCEDURE — 3044F HG A1C LEVEL LT 7.0%: CPT | Performed by: INTERNAL MEDICINE

## 2023-03-17 ENCOUNTER — LAB ENCOUNTER (OUTPATIENT)
Dept: LAB | Age: 63
End: 2023-03-17
Attending: INTERNAL MEDICINE
Payer: COMMERCIAL

## 2023-03-17 DIAGNOSIS — E11.8 CONTROLLED TYPE 2 DIABETES MELLITUS WITH COMPLICATION, WITHOUT LONG-TERM CURRENT USE OF INSULIN (HCC): ICD-10-CM

## 2023-03-17 LAB
ALBUMIN SERPL-MCNC: 3.8 G/DL (ref 3.4–5)
ALBUMIN/GLOB SERPL: 1.2 {RATIO} (ref 1–2)
ALP LIVER SERPL-CCNC: 88 U/L
ALT SERPL-CCNC: 40 U/L
ANION GAP SERPL CALC-SCNC: 6 MMOL/L (ref 0–18)
AST SERPL-CCNC: 23 U/L (ref 15–37)
BASOPHILS # BLD AUTO: 0.04 X10(3) UL (ref 0–0.2)
BASOPHILS NFR BLD AUTO: 0.8 %
BILIRUB SERPL-MCNC: 0.5 MG/DL (ref 0.1–2)
BUN BLD-MCNC: 13 MG/DL (ref 7–18)
BUN/CREAT SERPL: 17.8 (ref 10–20)
CALCIUM BLD-MCNC: 9.3 MG/DL (ref 8.5–10.1)
CHLORIDE SERPL-SCNC: 105 MMOL/L (ref 98–112)
CHOLEST SERPL-MCNC: 140 MG/DL (ref ?–200)
CO2 SERPL-SCNC: 29 MMOL/L (ref 21–32)
CREAT BLD-MCNC: 0.73 MG/DL
CREAT UR-SCNC: 39.9 MG/DL
DEPRECATED RDW RBC AUTO: 41.2 FL (ref 35.1–46.3)
EOSINOPHIL # BLD AUTO: 0.19 X10(3) UL (ref 0–0.7)
EOSINOPHIL NFR BLD AUTO: 3.8 %
ERYTHROCYTE [DISTWIDTH] IN BLOOD BY AUTOMATED COUNT: 12.1 % (ref 11–15)
FASTING PATIENT LIPID ANSWER: NO
FASTING STATUS PATIENT QL REPORTED: NO
GFR SERPLBLD BASED ON 1.73 SQ M-ARVRAT: 94 ML/MIN/1.73M2 (ref 60–?)
GLOBULIN PLAS-MCNC: 3.2 G/DL (ref 2.8–4.4)
GLUCOSE BLD-MCNC: 99 MG/DL (ref 70–99)
HCT VFR BLD AUTO: 41.9 %
HDLC SERPL-MCNC: 61 MG/DL (ref 40–59)
HGB BLD-MCNC: 14.3 G/DL
IMM GRANULOCYTES # BLD AUTO: 0.01 X10(3) UL (ref 0–1)
IMM GRANULOCYTES NFR BLD: 0.2 %
LDLC SERPL CALC-MCNC: 66 MG/DL (ref ?–100)
LYMPHOCYTES # BLD AUTO: 2.01 X10(3) UL (ref 1–4)
LYMPHOCYTES NFR BLD AUTO: 39.7 %
MCH RBC QN AUTO: 31.4 PG (ref 26–34)
MCHC RBC AUTO-ENTMCNC: 34.1 G/DL (ref 31–37)
MCV RBC AUTO: 92.1 FL
MICROALBUMIN UR-MCNC: 0.62 MG/DL
MICROALBUMIN/CREAT 24H UR-RTO: 15.5 UG/MG (ref ?–30)
MONOCYTES # BLD AUTO: 0.39 X10(3) UL (ref 0.1–1)
MONOCYTES NFR BLD AUTO: 7.7 %
NEUTROPHILS # BLD AUTO: 2.42 X10 (3) UL (ref 1.5–7.7)
NEUTROPHILS # BLD AUTO: 2.42 X10(3) UL (ref 1.5–7.7)
NEUTROPHILS NFR BLD AUTO: 47.8 %
NONHDLC SERPL-MCNC: 79 MG/DL (ref ?–130)
OSMOLALITY SERPL CALC.SUM OF ELEC: 290 MOSM/KG (ref 275–295)
PLATELET # BLD AUTO: 301 10(3)UL (ref 150–450)
POTASSIUM SERPL-SCNC: 4.2 MMOL/L (ref 3.5–5.1)
PROT SERPL-MCNC: 7 G/DL (ref 6.4–8.2)
RBC # BLD AUTO: 4.55 X10(6)UL
SODIUM SERPL-SCNC: 140 MMOL/L (ref 136–145)
TRIGL SERPL-MCNC: 64 MG/DL (ref 30–149)
TSI SER-ACNC: 1.26 MIU/ML (ref 0.36–3.74)
VLDLC SERPL CALC-MCNC: 10 MG/DL (ref 0–30)
WBC # BLD AUTO: 5.1 X10(3) UL (ref 4–11)

## 2023-03-17 PROCEDURE — 82570 ASSAY OF URINE CREATININE: CPT

## 2023-03-17 PROCEDURE — 84443 ASSAY THYROID STIM HORMONE: CPT

## 2023-03-17 PROCEDURE — 82043 UR ALBUMIN QUANTITATIVE: CPT

## 2023-03-17 PROCEDURE — 36415 COLL VENOUS BLD VENIPUNCTURE: CPT

## 2023-03-17 PROCEDURE — 80061 LIPID PANEL: CPT

## 2023-03-17 PROCEDURE — 80053 COMPREHEN METABOLIC PANEL: CPT

## 2023-03-17 PROCEDURE — 85025 COMPLETE CBC W/AUTO DIFF WBC: CPT

## 2023-03-17 PROCEDURE — 3061F NEG MICROALBUMINURIA REV: CPT | Performed by: INTERNAL MEDICINE

## 2023-03-20 NOTE — TELEPHONE ENCOUNTER
Please call patient -    Good news, your lab results are stable and at goal.  Your labs demonstrate normal kidney and liver function. Normal urine testing. Normal thyroid function. Your cholesterol levels are at goal.  Please continue current medications and contact the clinic with any questions or concerns.   Dr. Ricardo Chance

## 2023-03-20 NOTE — TELEPHONE ENCOUNTER
Reviewed test results with pt and she verbalized understanding. She agrees to continue current medications. She asks that you print Rx for [place]. She [place] from [place] and it usually takes 3 weeks to get the medication. She would like to  script. Routed to Dr Jayy Escalante.

## 2023-03-23 ENCOUNTER — OFFICE VISIT (OUTPATIENT)
Dept: OPHTHALMOLOGY | Facility: CLINIC | Age: 63
End: 2023-03-23

## 2023-03-23 DIAGNOSIS — H40.1131 PRIMARY OPEN ANGLE GLAUCOMA OF BOTH EYES, MILD STAGE: Primary | ICD-10-CM

## 2023-03-23 PROCEDURE — 99213 OFFICE O/P EST LOW 20 MIN: CPT | Performed by: OPHTHALMOLOGY

## 2023-03-23 RX ORDER — LATANOPROST 50 UG/ML
SOLUTION/ DROPS OPHTHALMIC
Qty: 3 EACH | Refills: 3 | Status: SHIPPED | OUTPATIENT
Start: 2023-03-23

## 2023-03-23 NOTE — PATIENT INSTRUCTIONS
Primary open angle glaucoma of both eyes, mild stage  Discussed with patient Intraocular pressure stable, tolerating medications. Will continue same regimen of Latanoprost in both eyes every morning. Stressed importance of taking Latanoprost every day. Will have patient back in 4 months for a visual field, OCT, diabetic eye exam and photos.

## 2023-03-23 NOTE — ASSESSMENT & PLAN NOTE
Discussed with patient Intraocular pressure stable, tolerating medications. Will continue same regimen of Latanoprost in both eyes every morning. Stressed importance of taking Latanoprost every day. Will have patient back in 4 months for a visual field, OCT, diabetic eye exam and photos.

## 2023-03-24 ENCOUNTER — TELEPHONE (OUTPATIENT)
Dept: ENDOCRINOLOGY CLINIC | Facility: CLINIC | Age: 63
End: 2023-03-24

## 2023-03-24 NOTE — TELEPHONE ENCOUNTER
Left detailed message that RX is ready for  and someone will be at [facility] until 3PM.  RX placed in an envelope and placed in  folder.

## 2023-03-24 NOTE — TELEPHONE ENCOUNTER
Dr. Delma Linares,     It looks like RX was sent electronically but patient would like printed RX. Pended another script as RN unable to reprint. Thank you.

## 2023-03-24 NOTE — TELEPHONE ENCOUNTER
Patient awaiting a call back for written script for rx Nila Marie. Please call at [phone],[facility].   *see closed te 3/20

## 2023-05-26 RX ORDER — GLIMEPIRIDE 4 MG/1
TABLET ORAL
Qty: 90 TABLET | Refills: 0 | Status: SHIPPED | OUTPATIENT
Start: 2023-05-26

## 2023-06-01 ENCOUNTER — TELEPHONE (OUTPATIENT)
Dept: ENDOCRINOLOGY CLINIC | Facility: CLINIC | Age: 63
End: 2023-06-01

## 2023-06-01 NOTE — TELEPHONE ENCOUNTER
Received fax from [address] [facility] attached is pt recent foot exam dated on 05/25/23, exam put in flowsheet & placed provider folder for review.

## 2023-06-09 RX ORDER — GLIMEPIRIDE 4 MG/1
TABLET ORAL
Qty: 90 TABLET | Refills: 0 | Status: SHIPPED | OUTPATIENT
Start: 2023-06-09

## 2023-06-09 NOTE — TELEPHONE ENCOUNTER
LOV: 3/13/23    RTC: 6 Months    FU: 10/23/23    Last Refill: 5/26/23    3 Month Supply Pending     Called to help schedule a f/u appt for first available

## 2023-06-29 RX ORDER — LISINOPRIL 5 MG/1
5 TABLET ORAL DAILY
Qty: 90 TABLET | Refills: 0 | Status: SHIPPED | OUTPATIENT
Start: 2023-06-29

## 2023-06-29 RX ORDER — METFORMIN HYDROCHLORIDE 500 MG/1
1000 TABLET, EXTENDED RELEASE ORAL 2 TIMES DAILY WITH MEALS
Qty: 360 TABLET | Refills: 0 | Status: SHIPPED | OUTPATIENT
Start: 2023-06-29

## 2023-07-18 ENCOUNTER — OFFICE VISIT (OUTPATIENT)
Dept: OPHTHALMOLOGY | Facility: CLINIC | Age: 63
End: 2023-07-18

## 2023-07-18 DIAGNOSIS — Z96.1 PSEUDOPHAKIA OF BOTH EYES: ICD-10-CM

## 2023-07-18 DIAGNOSIS — H40.1131 PRIMARY OPEN ANGLE GLAUCOMA OF BOTH EYES, MILD STAGE: Primary | ICD-10-CM

## 2023-07-18 DIAGNOSIS — H43.393 VITREOUS FLOATERS OF BOTH EYES: ICD-10-CM

## 2023-07-18 DIAGNOSIS — E11.9 DIABETES MELLITUS TYPE 2 WITHOUT RETINOPATHY (HCC): ICD-10-CM

## 2023-07-18 PROCEDURE — 92014 COMPRE OPH EXAM EST PT 1/>: CPT | Performed by: OPHTHALMOLOGY

## 2023-07-18 PROCEDURE — 92133 CPTRZD OPH DX IMG PST SGM ON: CPT | Performed by: OPHTHALMOLOGY

## 2023-07-18 PROCEDURE — 92250 FUNDUS PHOTOGRAPHY W/I&R: CPT | Performed by: OPHTHALMOLOGY

## 2023-07-18 PROCEDURE — 92083 EXTENDED VISUAL FIELD XM: CPT | Performed by: OPHTHALMOLOGY

## 2023-07-18 PROCEDURE — 2023F DILAT RTA XM W/O RTNOPTHY: CPT | Performed by: OPHTHALMOLOGY

## 2023-07-18 NOTE — PATIENT INSTRUCTIONS
Primary open angle glaucoma of both eyes, mild stage  Visual field and OCT completed in office today with stable results that were discussed   Photos taken today to document optic nerves. Discussed with patient Intraocular pressure stable, tolerating medications. Will continue same regimen of Latanoprost in both eyes every morning. Stressed importance of taking Latanoprost every morning. Will have patient back in 4 months for a pressure check. Pseudophakia of both eyes  No treatment. Continue same glasses. Vitreous floaters of both eyes  No treatment. There is no evidence of retinal pathology. All signs and symptoms of retinal detachment/tears explained in detail. Patient instructed to call the office if they experience increase in floaters, increase in flashes of light, loss of vision or curtain or veil effect. Diabetes mellitus type 2 without retinopathy ([address]  Diabetes type II: no background of retinopathy, no signs of neovascularization noted. Discussed ocular and systemic benefits of blood sugar control. Diagnosis and treatment discussed in detail with patient. Private car

## 2023-07-18 NOTE — ASSESSMENT & PLAN NOTE
Visual field and OCT completed in office today with stable results that were discussed   Photos taken today to document optic nerves. Discussed with patient Intraocular pressure stable, tolerating medications. Will continue same regimen of Latanoprost in both eyes every morning. Stressed importance of taking Latanoprost every morning. Will have patient back in 4 months for a pressure check.

## 2023-07-18 NOTE — PROGRESS NOTES
Marko Bush is a 65year old female. HPI:     HPI    Patient is here for VF, OCT, DM EE and Photos. Patient is on Latanoprost in both eyes in the morning because she was forgetting to put them in at night. Pt denies vision changes.       Pt has been a diabetic for 15 years       Pt's diabetes is currently controlled by pills  Pt checks BS once in a while    Pt's last blood sugar was 121   Last HA1C was 6.6 on 3/13/23  Endocrinologist: Dr. Saira Witt edited by Esha Plata OIVETT on 2023  4:30 PM.        Patient History:  Past Medical History:   Diagnosis Date    Acute iritis 2008    NG:  OD    After-cataract of left eye with vision obscured 2015    Cataract     NG: OD    Cataract     NG:  OS    Episcleritis 2009    NG:  OD    Ganglion 10/22/2013    NG: Right thumb    High blood pressure     Iritis 2009    NG: OD    Myopia with astigmatism and presbyopia     NG:  OU    Ocular hypertension     NG:  OS    Ophthalmological disorder     NG:  Macula pigmentation changes, OS,     Type II or unspecified type diabetes mellitus without mention of complication, not stated as uncontrolled     NG:  No retinopathy, OU,     Type II or unspecified type diabetes mellitus without mention of complication, not stated as uncontrolled     NG:  No retinopathy, OU,     Type II or unspecified type diabetes mellitus without mention of complication, not stated as uncontrolled     NG:  No retinopathy, OU,     Type II or unspecified type diabetes mellitus without mention of complication, not stated as uncontrolled     NG:  No retinopathy, OU,     Type II or unspecified type diabetes mellitus without mention of complication, not stated as uncontrolled     NG: No retinopathy, OU,        Surgical History: Marko Bush has a past surgical history that includes ; other surgical history (NG: Bunion/hammer toes surgery ); other surgical history (NG:  Excision ganglion, right thumb, 10/22/2013 - Ernesta Kawasaki ); Cataract extraction w/  intraocular lens implant (Right, 4/21/14) ( Phaco w/ PC IOL/general/vision blue/ RJM); Cataract extraction w/  intraocular lens implant (Left, 6/23/14) (Phaco with PC IOL/ general/ Vision Blue/ RJM); Yag Capsulotomy - OS - Left Eye (Left, 1/6/16) (OREN); Yag Capsulotomy - OD - Right Eye (Right, 6/22/2016) (Dr. Mauricio Costa); and colonoscopy (N/A, 10/26/2022) (Procedure: COLONOSCOPY;  Surgeon: Ej Ellison MD;  Location: [facility]). Family History   Problem Relation Age of Onset    Cancer Father         NG: Esophageal    Heart Disease Mother         NG:  CAD    Cancer Mother     Diabetes Mother     Psychiatric Paternal Grandmother     Ovarian Cancer Paternal Grandmother     Breast Cancer Cousin     Ovarian Cancer Paternal Cousin Female     Breast Cancer Paternal Cousin Female         pre isidro    Glaucoma Neg     Macular degeneration Neg        Social History:   Social History     Socioeconomic History    Marital status:    Tobacco Use    Smoking status: Never    Smokeless tobacco: Never   Vaping Use    Vaping Use: Never used   Substance and Sexual Activity    Alcohol use: Yes     Comment:   Occasionally     Drug use: No   Other Topics Concern    Caffeine Concern Yes       Medications:  Current Outpatient Medications   Medication Sig Dispense Refill    lisinopril 5 MG Oral Tab Take 1 tablet (5 mg total) by mouth daily. 90 tablet 0    metFORMIN  MG Oral Tablet 24 Hr Take 2 tablets (1,000 mg total) by mouth 2 (two) times daily with meals. 360 tablet 0    GLIMEPIRIDE 4 MG Oral Tab Take 1 tablet by mouth every morning before breakfast 90 tablet 0    dapagliflozin (FARXIGA) 10 MG Oral Tab Take 1 tablet (10 mg total) by mouth daily.  90 tablet 1    latanoprost 0.005 % Ophthalmic Solution INSTILL 1 DROP INTO BOTH EYES EVERY MORNING 3 each 3    sulfamethoxazole-trimethoprim DS (BACTRIM DS) 800-160 MG Oral Tab per tablet Take 1 tablet by mouth 2 (two) times daily.  14 tablet 0    OneTouch UltraSoft Lancets Does not apply Misc Test 1x daily 100 each 1    Glucose Blood (ONETOUCH ULTRA) In Vitro Strip Test 1x daily 100 each 1    ACCU-CHEK SOFTCLIX LANCETS Does not apply Misc   0    Glucose Blood In Vitro Strip place 1 by Intradermal route  every day         Allergies:    Ceftriaxone             RASH    ROS:       PHYSICAL EXAM:     Base Eye Exam       Visual Acuity (Snellen - Linear)         Right Left    Dist cc 20/40 +2 20/20 -1    Near cc 20/25 20/20              Tonometry (Applanation, 4:10 PM)         Right Left    Pressure 18 19              Pachymetry (10/20/2008)         Right Left    Thickness 567/-1 574/-2              Pupils         Pupils    Right PERRL    Left PERRL              Visual Fields         Left Right     Full Full              Extraocular Movement         Right Left     Full, Ortho Full, Ortho              Dilation       Both eyes: 1.0% Mydriacyl and 2.5% Deon Synephrine x 2 @ 4:01 PM                  Slit Lamp and Fundus Exam       Slit Lamp Exam         Right Left    Lids/Lashes Dermatochalasis, Meibomian gland dysfunction Dermatochalasis, Meibomian gland dysfunction, everted SOLO- no FB    Conjunctiva/Sclera 1+ Injection 1+ Injection    Cornea no K spindles, mild MDF no K spindles, mild MDF    Anterior Chamber Deep and quiet Deep and quiet    Iris No transillumination defects No transillumination defects    Lens PC IOL with YAG  PC IOL with YAG    Vitreous Vitreous floaters Vitreous floaters              Fundus Exam         Right Left    Disc Sloping margin, Peripapillary atrophy Good rim, Peripapillary atrophy    C/D Ratio 0.7 0.3    Macula Normal- no BDR RPE hypopigment above fovea- no BDR    Vessels Normal Normal    Periphery Normal Normal                  Refraction       Wearing Rx         Sphere Cylinder Axis Add    Right -2.00 +2.50 010 +2.25    Left -2.00 +3.00 155 +2.25      Type: Progressive bifocal              Manifest Refraction    Patient deferred refraction. She is happy with her current glasses. ASSESSMENT/PLAN:     Diagnoses and Plan:     Primary open angle glaucoma of both eyes, mild stage  Visual field and OCT completed in office today with stable results that were discussed   Photos taken today to document optic nerves. Discussed with patient Intraocular pressure stable, tolerating medications. Will continue same regimen of Latanoprost in both eyes every morning. Stressed importance of taking Latanoprost every morning. Will have patient back in 4 months for a pressure check. Pseudophakia of both eyes  No treatment. Continue same glasses. Vitreous floaters of both eyes  No treatment. There is no evidence of retinal pathology. All signs and symptoms of retinal detachment/tears explained in detail. Patient instructed to call the office if they experience increase in floaters, increase in flashes of light, loss of vision or curtain or veil effect. Diabetes mellitus type 2 without retinopathy ([facility] [address]  Diabetes type II: no background of retinopathy, no signs of neovascularization noted. Discussed ocular and systemic benefits of blood sugar control. Diagnosis and treatment discussed in detail with patient. No orders of the defined types were placed in this encounter. Meds This Visit:  Requested Prescriptions      No prescriptions requested or ordered in this encounter        Follow up instructions:  Return in about 4 months (around 11/18/2023) for IOP check.     7/18/2023  Scribed by: Christiano Louise MD

## 2023-10-01 NOTE — TELEPHONE ENCOUNTER
Please review. Protocol failed / No Protocol. Pend for 30/0, already received 90/0    Requested Prescriptions   Pending Prescriptions Disp Refills    LISINOPRIL 5 MG Oral Tab [Pharmacy Med Name: Lisinopril 5 Mg Tab Solc] 90 tablet 0     Sig: TAKE ONE TABLET BY MOUTH ONE TIME DAILY. needs appt for further refills. Hypertensive Medications Protocol Failed - 9/29/2023 12:41 PM        Failed - CMP or BMP in past 6 months     No results found for this or any previous visit (from the past 4392 hour(s)).             Failed - In person appointment or virtual visit in the past 6 months     Recent Outpatient Visits              2 months ago Primary open angle glaucoma of both eyes, mild stage    [facility], [address] Brenna [facility] MD    Office Visit    6 months ago Primary open angle glaucoma of both eyes, mild stage    [facility] Armani Benitez MD    Office Visit    6 months ago Controlled type 2 diabetes mellitus with complication, without long-term current use of insulin [facility])    Alec Quintero MD    Office Visit    9 months ago Abscess of left foot    [address] [facility], [address] [place] [address] Box [facility], [address]    Office Visit    9 months ago Primary open angle glaucoma of both eyes, mild stage    [facility] Armani Benitez MD    Office Visit          Future Appointments         Provider Department Appt Notes    In 3 weeks Lele Izaguirre MD [address], [address]     In 1 month Jazmín Banegas MD [address], [address], [place] IOP check                    Passed - In person appointment in the past 12 or next 3 months     Recent Outpatient Visits              2 months ago Primary open angle glaucoma of both eyes, mild stage    [facility], Sobia Thacker MD    Office Visit    6 months ago Primary open angle glaucoma of both eyes, mild stage    [address] [facility], Cris Lozano MD    Office Visit    6 months ago Controlled type 2 diabetes mellitus with complication, without long-term current use of insulin [facility])    Marifer Jo MD    Office Visit    9 months ago Abscess of left foot    [address] [facility], [address] [place] [address] [address] [facility], [address]    Office Visit    9 months ago Primary open angle glaucoma of both eyes, mild stage    [address] [facility], Monica Jc MD    Office Visit          Future Appointments         Provider Department Appt Notes    In 3 weeks Cathi Quinones MD [address] [facility], [facility]     In 1 month Becca Herrera MD [facility], [address], [place] IOP check                    Passed - Last BP reading less than 140/90     BP Readings from Last 1 Encounters:  03/13/23 : 136/67              Passed - EGFRCR or GFRNAA > 50     GFR Evaluation  EGFRCR: 94 , resulted on 3/17/2023

## 2023-10-02 RX ORDER — LISINOPRIL 5 MG/1
5 TABLET ORAL DAILY
Qty: 90 TABLET | Refills: 0 | Status: SHIPPED | OUTPATIENT
Start: 2023-10-02

## 2023-10-02 RX ORDER — LISINOPRIL 5 MG/1
5 TABLET ORAL DAILY
Qty: 30 TABLET | Refills: 0 | OUTPATIENT
Start: 2023-10-02

## 2023-10-02 NOTE — TELEPHONE ENCOUNTER
Refusal reason: Appt required, please call patient     Call center please call and schedule an appointment. Thank You. Last filled by Dr. Blanca Meek for #90 on 6/29/23.

## 2023-10-02 NOTE — TELEPHONE ENCOUNTER
Health Maintenance Due   Topic Date Due   • Annual Physical (ages 3-18)  10/19/2019       Patient is up to date, no discussion needed.         Pt is calling for a refill to be sent to [place].  Pt states that last refill came from PCP but should be coming from Endo.         lisinopril 5 MG Oral Tab, Take 1 tablet (5 mg total) by mouth daily. , Disp: 90 tablet, Rfl: 0

## 2023-10-02 NOTE — TELEPHONE ENCOUNTER
Spoke, with the patient and informed her of the message below. Pt states that she get her medication from Dr. Joaquin Oliveira. Per the patient please, disregard the refill request. Pt will request it from Dr. Joaquin Oliveira.

## 2023-10-23 ENCOUNTER — OFFICE VISIT (OUTPATIENT)
Dept: ENDOCRINOLOGY CLINIC | Facility: CLINIC | Age: 63
End: 2023-10-23

## 2023-10-23 VITALS
WEIGHT: 162 LBS | HEART RATE: 57 BPM | BODY MASS INDEX: 26 KG/M2 | SYSTOLIC BLOOD PRESSURE: 118 MMHG | DIASTOLIC BLOOD PRESSURE: 65 MMHG

## 2023-10-23 DIAGNOSIS — E11.8 CONTROLLED TYPE 2 DIABETES MELLITUS WITH COMPLICATION, WITHOUT LONG-TERM CURRENT USE OF INSULIN (HCC): Primary | ICD-10-CM

## 2023-10-23 LAB
CARTRIDGE LOT#: ABNORMAL NUMERIC
GLUCOSE BLOOD: 133
HEMOGLOBIN A1C: 6.3 % (ref 4.3–5.6)
TEST STRIP LOT #: NORMAL NUMERIC

## 2023-10-23 PROCEDURE — 3078F DIAST BP <80 MM HG: CPT | Performed by: INTERNAL MEDICINE

## 2023-10-23 PROCEDURE — 3044F HG A1C LEVEL LT 7.0%: CPT | Performed by: INTERNAL MEDICINE

## 2023-10-23 PROCEDURE — 99214 OFFICE O/P EST MOD 30 MIN: CPT | Performed by: INTERNAL MEDICINE

## 2023-10-23 PROCEDURE — 83036 HEMOGLOBIN GLYCOSYLATED A1C: CPT | Performed by: INTERNAL MEDICINE

## 2023-10-23 PROCEDURE — 82947 ASSAY GLUCOSE BLOOD QUANT: CPT | Performed by: INTERNAL MEDICINE

## 2023-10-23 PROCEDURE — 3074F SYST BP LT 130 MM HG: CPT | Performed by: INTERNAL MEDICINE

## 2023-10-23 RX ORDER — METFORMIN HYDROCHLORIDE 500 MG/1
1000 TABLET, EXTENDED RELEASE ORAL 2 TIMES DAILY WITH MEALS
Qty: 360 TABLET | Refills: 1 | Status: SHIPPED | OUTPATIENT
Start: 2023-10-23

## 2023-10-23 RX ORDER — METFORMIN HYDROCHLORIDE 500 MG/1
1000 TABLET, EXTENDED RELEASE ORAL 2 TIMES DAILY WITH MEALS
Qty: 360 TABLET | Refills: 0 | OUTPATIENT
Start: 2023-10-23

## 2023-10-24 ENCOUNTER — TELEPHONE (OUTPATIENT)
Dept: FAMILY MEDICINE CLINIC | Facility: CLINIC | Age: 63
End: 2023-10-24

## 2023-10-24 NOTE — TELEPHONE ENCOUNTER
Patient calling, noticed a medication on her list that she is not taking and would like to know if it can be removed from her list.    Medication is     sulfamethoxazole-trimethoprim DS (BACTRIM DS) 800-160 MG Oral Tab per tablet     Prescribed back in 12/2022 by Dr. Mandy Sims

## 2023-11-27 ENCOUNTER — TELEPHONE (OUTPATIENT)
Dept: ENDOCRINOLOGY CLINIC | Facility: CLINIC | Age: 63
End: 2023-11-27

## 2023-11-27 RX ORDER — GLIMEPIRIDE 4 MG/1
4 TABLET ORAL
Qty: 90 TABLET | Refills: 1 | Status: SHIPPED | OUTPATIENT
Start: 2023-11-27

## 2023-11-27 NOTE — TELEPHONE ENCOUNTER
Called patient and informed her that [place] script is ready. She will  RX in [place]. RN will bring script to [facility].

## 2023-11-27 NOTE — TELEPHONE ENCOUNTER
Patient is calling to request a refill on the following     Disp Refills Start End    GLIMEPIRIDE 4 MG Oral Tab 90 tablet 0 6/9/2023     Sig: Take 1 tablet by mouth every morning before breakfast    Sent to pharmacy as: Glimepiride 4 MG Oral Tablet (Amaryl)    E-Prescribing Status: Receipt confirmed by pharmacy (6/9/2023  1:22 PM CDT)        Patient is calling to ask for a paper prescription for farxiga to be able to

## 2023-11-27 NOTE — TELEPHONE ENCOUNTER
LOV: 10/23/23    RTC 6 months    RN pended script for glimepiride to be sent electronically and Ilia Hutchins to be printed once signed by provider.

## 2023-11-28 ENCOUNTER — OFFICE VISIT (OUTPATIENT)
Dept: OPHTHALMOLOGY | Facility: CLINIC | Age: 63
End: 2023-11-28

## 2023-11-28 DIAGNOSIS — H40.1131 PRIMARY OPEN ANGLE GLAUCOMA OF BOTH EYES, MILD STAGE: Primary | ICD-10-CM

## 2023-11-28 PROCEDURE — 99213 OFFICE O/P EST LOW 20 MIN: CPT | Performed by: OPHTHALMOLOGY

## 2023-11-28 NOTE — PROGRESS NOTES
Martin Golden is a 59year old female. HPI:     HPI    Patient is here for an IOP check. She is taking Latanoprost OU QAM as directed. Patient states her vision is stable. Last edited by Zhou Rey OT on 2023  4:11 PM.        Patient History:  Past Medical History:   Diagnosis Date    Acute iritis 2008    NG:  OD    After-cataract of left eye with vision obscured 2015    Cataract     NG: OD    Cataract     NG:  OS    Episcleritis 2009    NG:  OD    Ganglion 10/22/2013    NG: Right thumb    High blood pressure     Iritis 2009    NG: OD    Myopia with astigmatism and presbyopia     NG:  OU    Ocular hypertension     NG:  OS    Ophthalmological disorder     NG:  Macula pigmentation changes, OS,     Type II or unspecified type diabetes mellitus without mention of complication, not stated as uncontrolled     NG:  No retinopathy, OU,     Type II or unspecified type diabetes mellitus without mention of complication, not stated as uncontrolled     NG:  No retinopathy, OU,     Type II or unspecified type diabetes mellitus without mention of complication, not stated as uncontrolled     NG:  No retinopathy, OU,     Type II or unspecified type diabetes mellitus without mention of complication, not stated as uncontrolled     NG:  No retinopathy, OU,     Type II or unspecified type diabetes mellitus without mention of complication, not stated as uncontrolled     NG: No retinopathy, OU,        Surgical History: Martin Golden has a past surgical history that includes ; other surgical history (NG: Bunion/hammer toes surgery ); other surgical history (NG:  Excision ganglion, right thumb, 10/22/2013 - Eleazar Goldstein ); Cataract extraction w/  intraocular lens implant (Right, 14) ( Phaco w/ PC IOL/general/vision blue/ RJM);  Cataract extraction w/  intraocular lens implant (Left, 14) (Phaco with PC IOL/ general/ Vision Blue/ RJM); Yag Capsulotomy - OS - Left Eye (Left, 1/6/16) (OREN); Yag Capsulotomy - OD - Right Eye (Right, 6/22/2016) (Dr. Kevin Roberts); and colonoscopy (N/A, 10/26/2022) (Procedure: COLONOSCOPY;  Surgeon: Molly Verma MD;  Location: [facility]). Family History   Problem Relation Age of Onset    Cancer Father         NG: Esophageal    Heart Disease Mother         NG:  CAD    Cancer Mother     Diabetes Mother     Psychiatric Paternal Grandmother     Ovarian Cancer Paternal Grandmother     Breast Cancer Cousin     Ovarian Cancer Paternal Cousin Female     Breast Cancer Paternal Cousin Female         pre isidro    Glaucoma Neg     Macular degeneration Neg        Social History:   Social History     Socioeconomic History    Marital status:    Tobacco Use    Smoking status: Never    Smokeless tobacco: Never   Vaping Use    Vaping Use: Never used   Substance and Sexual Activity    Alcohol use: Yes     Comment:   Occasionally     Drug use: No   Other Topics Concern    Caffeine Concern Yes       Medications:  Current Outpatient Medications   Medication Sig Dispense Refill    dapagliflozin (FARXIGA) 10 MG Oral Tab Take 1 tablet (10 mg total) by mouth daily. 90 tablet 1    glimepiride 4 MG Oral Tab Take 1 tablet (4 mg total) by mouth before breakfast. 90 tablet 1    metFORMIN  MG Oral Tablet 24 Hr Take 2 tablets (1,000 mg total) by mouth 2 (two) times daily with meals. 360 tablet 1    lisinopril 5 MG Oral Tab Take 1 tablet (5 mg total) by mouth daily. 90 tablet 0    latanoprost 0.005 % Ophthalmic Solution INSTILL 1 DROP INTO BOTH EYES EVERY MORNING 3 each 3    OneTouch UltraSoft Lancets Does not apply Misc Test 1x daily 100 each 1    Glucose Blood (ONETOUCH ULTRA) In Vitro Strip Test 1x daily 100 each 1    ACCU-CHEK SOFTCLIX LANCETS Does not apply Misc   0    Glucose Blood In Vitro Strip place 1 by Intradermal route  every day         Allergies:   Allergies   Allergen Reactions    Ceftriaxone RASH       ROS: PHYSICAL EXAM:     Base Eye Exam       Visual Acuity (Snellen - Linear)         Right Left    Dist cc 20/30 -2 20/25      Correction: Glasses              Tonometry (Applanation, 4:15 PM)         Right Left    Pressure 19 19              Pachymetry (10/20/2008)         Right Left    Thickness 567/-1 574/-2                  Slit Lamp and Fundus Exam       Slit Lamp Exam         Right Left    Lids/Lashes Dermatochalasis, Meibomian gland dysfunction Dermatochalasis, Meibomian gland dysfunction, everted SOLO- no FB    Conjunctiva/Sclera 1+ Injection 1+ Injection    Cornea  no K spindles, mild MDF no K spindles, mild MDF    Anterior Chamber Deep and quiet Deep and quiet    Iris No transillumination defects No transillumination defects    Lens PC IOL with YAG  PC IOL with YAG               Fundus Exam         Right Left    Disc Sloping margin, Peripapillary atrophy Good rim,  Peripapillary atrophy    C/D Ratio 0.7 0.3                  Refraction       Wearing Rx         Sphere Cylinder Axis Add    Right -2.00 +2.50 010 +2.25    Left -2.00 +3.00 155 +2.25      Type: Progressive bifocal                     ASSESSMENT/PLAN:     Diagnoses and Plan:     Primary open angle glaucoma of both eyes, mild stage  Discussed with patient Intraocular pressure stable, tolerating medications. Will continue same regimen of Latanoprost in both eyes every morning. Stressed importance of taking Latanoprost every day. Will have patient back in 4 months for a pressure check    No orders of the defined types were placed in this encounter. Meds This Visit:  Requested Prescriptions      No prescriptions requested or ordered in this encounter        Follow up instructions:  Return in about 4 months (around 3/28/2024) for IOP check.     11/28/2023  Scribed by: Joshua Degroot MD

## 2023-11-28 NOTE — PATIENT INSTRUCTIONS
Primary open angle glaucoma of both eyes, mild stage  Discussed with patient Intraocular pressure stable, tolerating medications. Will continue same regimen of Latanoprost in both eyes every morning. Stressed importance of taking Latanoprost every day.        Will have patient back in 4 months for a pressure check

## 2023-11-28 NOTE — ASSESSMENT & PLAN NOTE
Discussed with patient Intraocular pressure stable, tolerating medications. Will continue same regimen of Latanoprost in both eyes every morning. Stressed importance of taking Latanoprost every day.        Will have patient back in 4 months for a pressure check

## 2023-12-20 ENCOUNTER — TELEPHONE (OUTPATIENT)
Dept: OPHTHALMOLOGY | Facility: CLINIC | Age: 63
End: 2023-12-20

## 2023-12-20 RX ORDER — LATANOPROST 50 UG/ML
SOLUTION/ DROPS OPHTHALMIC
Qty: 3 EACH | Refills: 3 | Status: CANCELLED | OUTPATIENT
Start: 2023-12-20

## 2023-12-20 NOTE — TELEPHONE ENCOUNTER
Patient complains that for about 5 minutes today, her vision looked like she was looking through swiss cheese.  She states that after 5 minutes her vision was back to normal and she had no other symptoms.  I explained to patient that her cataracts cannot come back.  I explained that she possibly had the symptoms of an ophthalmic migraine.  I told patient to monitor her vision and call us if it happens again or if she has other concerns.  Patient also calling for a refill for Latanoprost.  Refill sent to Dr. Andrade.

## 2023-12-21 ENCOUNTER — TELEPHONE (OUTPATIENT)
Dept: ENDOCRINOLOGY CLINIC | Facility: CLINIC | Age: 63
End: 2023-12-21

## 2023-12-22 ENCOUNTER — TELEPHONE (OUTPATIENT)
Dept: ENDOCRINOLOGY CLINIC | Facility: CLINIC | Age: 63
End: 2023-12-22

## 2023-12-22 NOTE — TELEPHONE ENCOUNTER
Refer to 11/27/23 TE. There was a printed RX left for her at the [address] [place] [address] location. RN left detailed message to call back and that an RX was left for her at [facility] location. Or if she wants to pick it up here to call us back.

## 2023-12-22 NOTE — TELEPHONE ENCOUNTER
[facility] did not receive previous mailed [employer] - printed again - patient will  today - envelope left at

## 2023-12-26 NOTE — TELEPHONE ENCOUNTER
LVOM 11:46am advising we typically defer to PCP's recommendation and that most of the time if >48years of age, it is recommended

## 2023-12-27 NOTE — TELEPHONE ENCOUNTER
Refill sent to Dr. Raleigh Tamayo.       LDE:  Last visit:  Due for: IOP check  Upcoming visit: 3/20/24

## 2023-12-28 RX ORDER — LATANOPROST 50 UG/ML
SOLUTION/ DROPS OPHTHALMIC
Qty: 3 EACH | Refills: 3 | Status: SHIPPED | OUTPATIENT
Start: 2023-12-28

## 2024-01-02 ENCOUNTER — TELEPHONE (OUTPATIENT)
Dept: ENDOCRINOLOGY CLINIC | Facility: CLINIC | Age: 64
End: 2024-01-02

## 2024-01-02 DIAGNOSIS — E11.8 CONTROLLED TYPE 2 DIABETES MELLITUS WITH COMPLICATION, WITHOUT LONG-TERM CURRENT USE OF INSULIN (HCC): Primary | ICD-10-CM

## 2024-01-02 RX ORDER — LISINOPRIL 5 MG/1
5 TABLET ORAL DAILY
Qty: 90 TABLET | Refills: 0 | Status: SHIPPED | OUTPATIENT
Start: 2024-01-02

## 2024-01-02 NOTE — TELEPHONE ENCOUNTER
Pt calling for a refill to be sent to [facility].          lisinopril 5 MG Oral Tab, Take 1 tablet (5 mg total) by mouth daily., Disp: 90 tablet, Rfl: 0

## 2024-01-03 DIAGNOSIS — E11.8 CONTROLLED TYPE 2 DIABETES MELLITUS WITH COMPLICATION, WITHOUT LONG-TERM CURRENT USE OF INSULIN (HCC): ICD-10-CM

## 2024-01-03 RX ORDER — LISINOPRIL 5 MG/1
5 TABLET ORAL DAILY
Qty: 90 TABLET | Refills: 0 | OUTPATIENT
Start: 2024-01-03

## 2024-01-04 ENCOUNTER — OFFICE VISIT (OUTPATIENT)
Dept: PODIATRY CLINIC | Facility: CLINIC | Age: 64
End: 2024-01-04

## 2024-01-04 DIAGNOSIS — L60.3 NAIL DYSTROPHY: ICD-10-CM

## 2024-01-04 DIAGNOSIS — E11.42 TYPE 2 DIABETES MELLITUS WITH DIABETIC POLYNEUROPATHY, WITHOUT LONG-TERM CURRENT USE OF INSULIN (HCC): ICD-10-CM

## 2024-01-04 DIAGNOSIS — Z89.419 HISTORY OF AMPUTATION OF GREAT TOE (HCC): Primary | ICD-10-CM

## 2024-01-04 PROCEDURE — 99213 OFFICE O/P EST LOW 20 MIN: CPT | Performed by: STUDENT IN AN ORGANIZED HEALTH CARE EDUCATION/TRAINING PROGRAM

## 2024-01-04 NOTE — PROGRESS NOTES
[facility] Podiatry  Progress Note      Flora Moraes is a 63 year old female.   Chief Complaint   Patient presents with    Diabetic Foot Care     Consult- DFC and foot check -  States callus on both feet  -FBS- not taken this AM -  A1c- 6.3 on 10/23/24              HPI:     Patient is a pleasant 63-year-old diabetic female with history of right partial hallux amputation over 10 years ago.  Patient presents for bilateral foot evaluation    Allergies: Ceftriaxone    Current Outpatient Medications   Medication Sig Dispense Refill    lisinopril 5 MG Oral Tab Take 1 tablet (5 mg total) by mouth daily. 90 tablet 0    latanoprost 0.005 % Ophthalmic Solution INSTILL 1 DROP INTO BOTH EYES EVERY MORNING 3 each 3    dapagliflozin (FARXIGA) 10 MG Oral Tab Take 1 tablet (10 mg total) by mouth daily. 90 tablet 1    glimepiride 4 MG Oral Tab Take 1 tablet (4 mg total) by mouth before breakfast. 90 tablet 1    metFORMIN  MG Oral Tablet 24 Hr Take 2 tablets (1,000 mg total) by mouth 2 (two) times daily with meals. 360 tablet 1    OneTouch UltraSoft Lancets Does not apply Misc Test 1x daily 100 each 1    Glucose Blood (ONETOUCH ULTRA) In Vitro Strip Test 1x daily 100 each 1    ACCU-CHEK SOFTCLIX LANCETS Does not apply Misc   0    Glucose Blood In Vitro Strip place 1 by Intradermal route  every day        Past Medical History:   Diagnosis Date    Acute iritis 09/26/2008    NG:  OD    After-cataract of left eye with vision obscured 12/22/2015    Cataract 2006    NG: OD    Cataract 2006    NG:  OS    Episcleritis 12/14/2009    NG:  OD    Ganglion 10/22/2013    NG: Right thumb    High blood pressure     Iritis 12/14/2009    NG: OD    Myopia with astigmatism and presbyopia 2008    NG:  OU    Ocular hypertension 2008    NG:  OS    Ophthalmological disorder     NG:  Macula pigmentation changes, OS, 2006    Type II or unspecified type diabetes mellitus without mention of complication, not stated as uncontrolled     NG:   No retinopathy, OU, 2006    Type II or unspecified type diabetes mellitus without mention of complication, not stated as uncontrolled     NG:  No retinopathy, OU, 2009    Type II or unspecified type diabetes mellitus without mention of complication, not stated as uncontrolled     NG:  No retinopathy, OU, 2010    Type II or unspecified type diabetes mellitus without mention of complication, not stated as uncontrolled     NG:  No retinopathy, OU,     Type II or unspecified type diabetes mellitus without mention of complication, not stated as uncontrolled     NG: No retinopathy, OU,       Past Surgical History:   Procedure Laterality Date          CATARACT EXTRACTION W/  INTRAOCULAR LENS IMPLANT Right 14     Phaco w/ PC IOL/general/vision blue/ RJM    CATARACT EXTRACTION W/  INTRAOCULAR LENS IMPLANT Left 14    Phaco with PC IOL/ general/ Vision Blue/ RJM    COLONOSCOPY N/A 10/26/2022    Procedure: COLONOSCOPY;  Surgeon: Kathryn Ng MD;  Location: [facility]    OTHER SURGICAL HISTORY      NG: Bunion/hammer toes surgery     OTHER SURGICAL HISTORY      NG:  Excision ganglion, right thumb, 10/22/2013 - Lyle Seaman     YAG CAPSULOTOMY - OD - RIGHT EYE Right 2016    Dr. Andrade    YAG CAPSULOTOMY - OS - LEFT EYE Left 16    RJM      Family History   Problem Relation Age of Onset    Cancer Father         NG: Esophageal    Heart Disease Mother         NG:  CAD    Cancer Mother     Diabetes Mother     Psychiatric Paternal Grandmother     Ovarian Cancer Paternal Grandmother     Breast Cancer Cousin     Ovarian Cancer Paternal Cousin Female     Breast Cancer Paternal Cousin Female         pre isidro    Glaucoma Neg     Macular degeneration Neg       Social History     Socioeconomic History    Marital status:    Tobacco Use    Smoking status: Never    Smokeless tobacco: Never   Vaping Use    Vaping Use: Never used   Substance and Sexual Activity    Alcohol use: Yes     Comment:    Occasionally     Drug use: No   Other Topics Concern    Caffeine Concern Yes           REVIEW OF SYSTEMS:     Denies nause, fever, chills  No calf pain  Denies chest pain or SOB      EXAM:   LMP  (LMP Unknown)   GENERAL: well developed, well nourished, in no apparent distress  EXTREMITIES:   1. Integument: Normal skin temperature and turgor. Toenail x 10 are elongated and thickened.   2. Vascular: Dorsalis pedis two out of four bilateral and posterior tibial pulses two out of   four bilateral, capillary refill normal.   3. Musculoskeletal: All muscle groups are graded 5 out of 5 in the foot and ankle. S/p partial right hallux amputation.    4. Neurological: Normal sharp dull sensation; reflexes normal.             ASSESSMENT AND PLAN:   Diagnoses and all orders for this visit:    History of amputation of great toe (HCC)    Type 2 diabetes mellitus with diabetic polyneuropathy, without long-term current use of insulin (HCC)        Plan:         -Patient examined, chart history reviewed.  -Discussed importance of proper pedal hygiene, regular foot checks, and tight glucose control.  -Sharply debrided nails x9 with a sterile nail nipper achieving a 20% reduction in thickness and length, without incident.   -Ambulate with supportive shoes and inserts and avoid walking barefoot.  -Educated patient on acute signs of infection advised patient to seek immediate medical attention if symptoms arise.    RTC in 3 months     The patient indicates understanding of these issues and agrees to the plan.        Batsheva Dwyer DPM

## 2024-02-21 ENCOUNTER — TELEPHONE (OUTPATIENT)
Dept: ENDOCRINOLOGY CLINIC | Facility: CLINIC | Age: 64
End: 2024-02-21

## 2024-02-21 NOTE — TELEPHONE ENCOUNTER
Called patient to triage.   She has been having pain in her fingers for the past 1 month. Asking if OK to take supplement Nervive that she saw on TV to see if it helps. Wants to know if OK to take with her medications.   Confirmed taking:  Lisinopril 5 mg daily  MTF ER 1000 mg BID  Glimepiride 4 mg daily  Farxiga 10 mg daily.   No changes to diet or exercise    RN recommendations:  Starting checking bg at least once per day (she has not been checking) to screen for elevated sugars. She does have hx of neuropathy  Confirmed she has testing supplies  Noted she is a  so has repeated movements with hands throughout the day.   Warm compresses.

## 2024-02-22 NOTE — TELEPHONE ENCOUNTER
Yes ok to take supplement.  Review of ingredients demonstrates it is just vitamin B and Alpha lipoid acid 600mg daily.  It might less expensive to just take a B complex plus ALA. Thanks.

## 2024-03-20 ENCOUNTER — OFFICE VISIT (OUTPATIENT)
Dept: OPHTHALMOLOGY | Facility: CLINIC | Age: 64
End: 2024-03-20

## 2024-03-20 DIAGNOSIS — H40.1131 PRIMARY OPEN ANGLE GLAUCOMA OF BOTH EYES, MILD STAGE: Primary | ICD-10-CM

## 2024-03-20 PROCEDURE — 99213 OFFICE O/P EST LOW 20 MIN: CPT | Performed by: OPHTHALMOLOGY

## 2024-03-20 NOTE — ASSESSMENT & PLAN NOTE
Discussed with patient Intraocular pressure stable, tolerating medications.  Will continue same regimen of Latanoprost in both eyes every morning.    Stressed importance of taking Latanoprost every day.       Will have patient back in 4 months for a visual field, OCT, and diabetic eye exam

## 2024-03-20 NOTE — PROGRESS NOTES
Flora Moraes is a 63 year old female.    HPI:     HPI    Patient is here for an IOP check.  She is taking Latanoprost OU QAM as directed.  Patient states her vision is stable. She has noticed a prominent red vein on the sclera of her left eye.  Last edited by Caroline Almaguer OT on 3/20/2024  4:24 PM.        Patient History:  Past Medical History:   Diagnosis Date    Acute iritis 2008    NG:  OD    After-cataract of left eye with vision obscured 2015    Cataract     NG: OD    Cataract     NG:  OS    Episcleritis 2009    NG:  OD    Ganglion 10/22/2013    NG: Right thumb    High blood pressure     Iritis 2009    NG: OD    Myopia with astigmatism and presbyopia     NG:  OU    Ocular hypertension     NG:  OS    Ophthalmological disorder     NG:  Macula pigmentation changes, OS,     Type II or unspecified type diabetes mellitus without mention of complication, not stated as uncontrolled     NG:  No retinopathy, OU,     Type II or unspecified type diabetes mellitus without mention of complication, not stated as uncontrolled     NG:  No retinopathy, OU,     Type II or unspecified type diabetes mellitus without mention of complication, not stated as uncontrolled     NG:  No retinopathy, OU,     Type II or unspecified type diabetes mellitus without mention of complication, not stated as uncontrolled     NG:  No retinopathy, OU,     Type II or unspecified type diabetes mellitus without mention of complication, not stated as uncontrolled     NG: No retinopathy, OU,        Surgical History: Flora Moraes has a past surgical history that includes ; other surgical history (NG: Bunion/hammer toes surgery ); other surgical history (NG:  Excision ganglion, right thumb, 10/22/2013 - Lyle Seaman ); Cataract extraction w/  intraocular lens implant (Right, 14) ( Phaco w/ PC IOL/general/vision blue/ RJM); Cataract extraction w/   intraocular lens implant (Left, 6/23/14) (Phaco with PC IOL/ general/ Vision Blue/ RJM); Yag Capsulotomy - OS - Left Eye (Left, 1/6/16) (OREN); Yag Capsulotomy - OD - Right Eye (Right, 6/22/2016) (Dr. Andrade); and colonoscopy (N/A, 10/26/2022) (Procedure: COLONOSCOPY;  Surgeon: Kathryn Ng MD;  Location: [facility]).    Family History   Problem Relation Age of Onset    Cancer Father         NG: Esophageal    Heart Disease Mother         NG:  CAD    Cancer Mother     Diabetes Mother     Psychiatric Paternal Grandmother     Ovarian Cancer Paternal Grandmother     Breast Cancer Cousin     Ovarian Cancer Paternal Cousin Female     Breast Cancer Paternal Cousin Female         pre isidro    Glaucoma Neg     Macular degeneration Neg        Social History:   Social History     Socioeconomic History    Marital status:    Tobacco Use    Smoking status: Never    Smokeless tobacco: Never   Vaping Use    Vaping Use: Never used   Substance and Sexual Activity    Alcohol use: Yes     Comment:   Occasionally     Drug use: No   Other Topics Concern    Caffeine Concern Yes       Medications:  Current Outpatient Medications   Medication Sig Dispense Refill    lisinopril 5 MG Oral Tab Take 1 tablet (5 mg total) by mouth daily. 90 tablet 0    latanoprost 0.005 % Ophthalmic Solution INSTILL 1 DROP INTO BOTH EYES EVERY MORNING 3 each 3    dapagliflozin (FARXIGA) 10 MG Oral Tab Take 1 tablet (10 mg total) by mouth daily. 90 tablet 1    glimepiride 4 MG Oral Tab Take 1 tablet (4 mg total) by mouth before breakfast. 90 tablet 1    metFORMIN  MG Oral Tablet 24 Hr Take 2 tablets (1,000 mg total) by mouth 2 (two) times daily with meals. 360 tablet 1    OneTouch UltraSoft Lancets Does not apply Misc Test 1x daily 100 each 1    Glucose Blood (ONETOUCH ULTRA) In Vitro Strip Test 1x daily 100 each 1    ACCU-CHEK SOFTCLIX LANCETS Does not apply Misc   0    Glucose Blood In Vitro Strip place 1 by Intradermal route  every day          Allergies:  Allergies   Allergen Reactions    Ceftriaxone RASH       ROS:       PHYSICAL EXAM:     Base Eye Exam       Visual Acuity (Snellen - Linear)         Right Left    Dist cc 20/25 -1 20/25 +2      Correction: Glasses              Tonometry (Applanation, 4:30 PM)         Right Left    Pressure 17 18              Pachymetry (10/20/2008)         Right Left    Thickness 567/-1 574/-2              Pupils         Pupils    Right PERRL    Left PERRL                  Slit Lamp and Fundus Exam       Slit Lamp Exam         Right Left    Lids/Lashes Dermatochalasis, Meibomian gland dysfunction Dermatochalasis, Meibomian gland dysfunction, everted SOLO- no FB    Conjunctiva/Sclera 1+ Injection 1+ Injection    Cornea  no K spindles, mild MDF no K spindles, mild MDF    Anterior Chamber Deep and quiet Deep and quiet    Iris No transillumination defects No transillumination defects    Lens PC IOL with YAG  PC IOL with YAG               Fundus Exam         Right Left    Disc Sloping margin, Peripapillary atrophy Good rim,  Peripapillary atrophy    C/D Ratio 0.7 0.3                  Refraction       Wearing Rx         Sphere Cylinder Axis Add    Right -2.00 +2.50 010 +2.25    Left -2.00 +3.00 155 +2.25      Type: Progressive bifocal                     ASSESSMENT/PLAN:     Diagnoses and Plan:     Primary open angle glaucoma of both eyes, mild stage  Discussed with patient Intraocular pressure stable, tolerating medications.  Will continue same regimen of Latanoprost in both eyes every morning.    Stressed importance of taking Latanoprost every day.       Will have patient back in 4 months for a visual field, OCT, and diabetic eye exam      Orders Placed This Encounter   Procedures    OCT, Optic Nerve - OU - Both Eyes    [place] Visual Field - OU - Both Eyes       Meds This Visit:  Requested Prescriptions      No prescriptions requested or ordered in this encounter        Follow up instructions:  Return in about 4 months  (around 7/20/2024) for Visual Field, OCT, Diabetic eye exam.    3/20/2024  Scribed by: Lv Andrade MD

## 2024-03-23 NOTE — TELEPHONE ENCOUNTER
[phone] left detailed message (per verbal) that SH gave the okay to take Nervive. Also stated It might less expensive to just take a B complex plus ALA. Left cb # if any further questions.     Encounter closed.

## 2024-03-31 ENCOUNTER — HOSPITAL ENCOUNTER (OUTPATIENT)
Age: 64
Discharge: ANOTHER HOSPITAL | End: 2024-03-31
Payer: COMMERCIAL

## 2024-03-31 ENCOUNTER — APPOINTMENT (OUTPATIENT)
Dept: GENERAL RADIOLOGY | Facility: HOSPITAL | Age: 64
End: 2024-03-31
Attending: EMERGENCY MEDICINE
Payer: COMMERCIAL

## 2024-03-31 ENCOUNTER — HOSPITAL ENCOUNTER (EMERGENCY)
Facility: HOSPITAL | Age: 64
Discharge: HOME | End: 2024-03-31
Attending: EMERGENCY MEDICINE
Payer: COMMERCIAL

## 2024-03-31 VITALS
HEART RATE: 60 BPM | RESPIRATION RATE: 18 BRPM | TEMPERATURE: 98 F | SYSTOLIC BLOOD PRESSURE: 144 MMHG | DIASTOLIC BLOOD PRESSURE: 60 MMHG | OXYGEN SATURATION: 100 %

## 2024-03-31 VITALS
OXYGEN SATURATION: 98 % | TEMPERATURE: 98 F | DIASTOLIC BLOOD PRESSURE: 67 MMHG | SYSTOLIC BLOOD PRESSURE: 120 MMHG | HEIGHT: 66 IN | RESPIRATION RATE: 18 BRPM | WEIGHT: 160 LBS | BODY MASS INDEX: 25.71 KG/M2 | HEART RATE: 51 BPM

## 2024-03-31 DIAGNOSIS — E11.8 CONTROLLED TYPE 2 DIABETES MELLITUS WITH COMPLICATION, WITHOUT LONG-TERM CURRENT USE OF INSULIN (HCC): ICD-10-CM

## 2024-03-31 DIAGNOSIS — E11.628 DIABETIC FOOT INFECTION (HCC): Primary | ICD-10-CM

## 2024-03-31 DIAGNOSIS — L03.031 CELLULITIS OF GREAT TOE OF RIGHT FOOT: Primary | ICD-10-CM

## 2024-03-31 DIAGNOSIS — L08.9 DIABETIC FOOT INFECTION (HCC): Primary | ICD-10-CM

## 2024-03-31 DIAGNOSIS — L03.115 CELLULITIS OF RIGHT LOWER EXTREMITY: ICD-10-CM

## 2024-03-31 LAB
ANION GAP SERPL CALC-SCNC: 8 MMOL/L (ref 0–18)
BASOPHILS # BLD AUTO: 0.04 X10(3) UL (ref 0–0.2)
BASOPHILS NFR BLD AUTO: 0.7 %
BUN BLD-MCNC: 15 MG/DL (ref 9–23)
BUN/CREAT SERPL: 20 (ref 10–20)
CALCIUM BLD-MCNC: 9.7 MG/DL (ref 8.7–10.4)
CHLORIDE SERPL-SCNC: 107 MMOL/L (ref 98–112)
CO2 SERPL-SCNC: 27 MMOL/L (ref 21–32)
CREAT BLD-MCNC: 0.75 MG/DL
DEPRECATED RDW RBC AUTO: 42.3 FL (ref 35.1–46.3)
EGFRCR SERPLBLD CKD-EPI 2021: 90 ML/MIN/1.73M2 (ref 60–?)
EOSINOPHIL # BLD AUTO: 0.07 X10(3) UL (ref 0–0.7)
EOSINOPHIL NFR BLD AUTO: 1.2 %
ERYTHROCYTE [DISTWIDTH] IN BLOOD BY AUTOMATED COUNT: 12.4 % (ref 11–15)
GLUCOSE BLD-MCNC: 126 MG/DL (ref 70–99)
HCT VFR BLD AUTO: 40.5 %
HGB BLD-MCNC: 13.7 G/DL
IMM GRANULOCYTES # BLD AUTO: 0.02 X10(3) UL (ref 0–1)
IMM GRANULOCYTES NFR BLD: 0.3 %
LYMPHOCYTES # BLD AUTO: 1.93 X10(3) UL (ref 1–4)
LYMPHOCYTES NFR BLD AUTO: 31.9 %
MCH RBC QN AUTO: 31.2 PG (ref 26–34)
MCHC RBC AUTO-ENTMCNC: 33.8 G/DL (ref 31–37)
MCV RBC AUTO: 92.3 FL
MONOCYTES # BLD AUTO: 0.47 X10(3) UL (ref 0.1–1)
MONOCYTES NFR BLD AUTO: 7.8 %
NEUTROPHILS # BLD AUTO: 3.52 X10 (3) UL (ref 1.5–7.7)
NEUTROPHILS # BLD AUTO: 3.52 X10(3) UL (ref 1.5–7.7)
NEUTROPHILS NFR BLD AUTO: 58.1 %
OSMOLALITY SERPL CALC.SUM OF ELEC: 296 MOSM/KG (ref 275–295)
PLATELET # BLD AUTO: 245 10(3)UL (ref 150–450)
POTASSIUM SERPL-SCNC: 3.8 MMOL/L (ref 3.5–5.1)
RBC # BLD AUTO: 4.39 X10(6)UL
SODIUM SERPL-SCNC: 142 MMOL/L (ref 136–145)
WBC # BLD AUTO: 6.1 X10(3) UL (ref 4–11)

## 2024-03-31 PROCEDURE — 99284 EMERGENCY DEPT VISIT MOD MDM: CPT

## 2024-03-31 PROCEDURE — 96374 THER/PROPH/DIAG INJ IV PUSH: CPT

## 2024-03-31 PROCEDURE — 80048 BASIC METABOLIC PNL TOTAL CA: CPT | Performed by: EMERGENCY MEDICINE

## 2024-03-31 PROCEDURE — 87205 SMEAR GRAM STAIN: CPT | Performed by: EMERGENCY MEDICINE

## 2024-03-31 PROCEDURE — 85025 COMPLETE CBC W/AUTO DIFF WBC: CPT | Performed by: EMERGENCY MEDICINE

## 2024-03-31 PROCEDURE — 87070 CULTURE OTHR SPECIMN AEROBIC: CPT | Performed by: EMERGENCY MEDICINE

## 2024-03-31 PROCEDURE — 87077 CULTURE AEROBIC IDENTIFY: CPT | Performed by: EMERGENCY MEDICINE

## 2024-03-31 PROCEDURE — 73660 X-RAY EXAM OF TOE(S): CPT | Performed by: EMERGENCY MEDICINE

## 2024-03-31 RX ORDER — CEFAZOLIN SODIUM/WATER 2 G/20 ML
2 SYRINGE (ML) INTRAVENOUS ONCE
Status: COMPLETED | OUTPATIENT
Start: 2024-03-31 | End: 2024-03-31

## 2024-03-31 RX ORDER — SULFAMETHOXAZOLE AND TRIMETHOPRIM 800; 160 MG/1; MG/1
1 TABLET ORAL 2 TIMES DAILY
Qty: 14 TABLET | Refills: 0 | Status: SHIPPED | OUTPATIENT
Start: 2024-03-31 | End: 2024-04-07

## 2024-03-31 RX ORDER — CEFADROXIL 500 MG/1
500 CAPSULE ORAL 2 TIMES DAILY
Qty: 14 CAPSULE | Refills: 0 | Status: SHIPPED | OUTPATIENT
Start: 2024-03-31 | End: 2024-04-07

## 2024-03-31 NOTE — ED PROVIDER NOTES
Patient Seen in: [facility]      History     Chief Complaint   Patient presents with    Rash Skin Problem     Stated Complaint: Toe Pain    Subjective:   HPI    63-year-old female with past medical history of diabetes presenting to the immediate care for evaluation of a right great toe wound. Noted blood on her shoe yesterday and discoloration to the top of toe 2 days ago. She denies fevers.  Today there is swelling to the foot and ankle as well as redness.  Patient has remote hx of hammer toe deformity correction sx to the R great toe. Does not regularly check blood sugars.     Objective:   No pertinent past medical history.            No pertinent past surgical history.              No pertinent social history.            Review of Systems    Positive for stated complaint: Toe Pain  Other systems are as noted in HPI.  Constitutional and vital signs reviewed.      All other systems reviewed and negative except as noted above.    Physical Exam     ED Triage Vitals [03/31/24 1113]   /60   Pulse 60   Resp 18   Temp 97.8 °F (36.6 °C)   Temp src Temporal   SpO2 100 %   O2 Device None (Room air)       Current:/60   Pulse 60   Temp 97.8 °F (36.6 °C) (Temporal)   Resp 18   LMP  (LMP Unknown)   SpO2 100%         Physical Exam  Vitals and nursing note reviewed.   Constitutional:       General: She is not in acute distress.  HENT:      Head: Normocephalic and atraumatic.      Right Ear: External ear normal.      Left Ear: External ear normal.      Nose: Nose normal.      Mouth/Throat:      Mouth: Mucous membranes are moist.   Eyes:      Extraocular Movements: Extraocular movements intact.      Pupils: Pupils are equal, round, and reactive to light.   Cardiovascular:      Rate and Rhythm: Normal rate.   Pulmonary:      Effort: Pulmonary effort is normal.   Abdominal:      General: Abdomen is flat.   Musculoskeletal:         General: Normal range of motion.      Cervical back: Normal range of  motion.      Right foot: Deformity present.      Comments: The R great toe is edematous, erythematous. Wound to distal aspect with purulent drainage   Skin:     General: Skin is warm.   Neurological:      General: No focal deficit present.      Mental Status: She is alert and oriented to person, place, and time.   Psychiatric:         Mood and Affect: Mood normal.         Behavior: Behavior normal.               ED Course   Labs Reviewed - No data to display     63-year-old female presenting from home for evaluation of increased redness, drainage from the right great toe.  Patient is a diabetic, no feeling in the toe.  The right great toe is quite edematous with erythema and streaking extending from the toe over the dorsum of foot  Ddx-osteomyelitis, cellulitis, diabetic toe infection     Discussed pt's presentation with podiatry Dr. Deya dorado pt needs MRI and likely admission for further tx.     Pt will go to [facility] for further evaluation         MDM                                         Medical Decision Making      Disposition and Plan     Clinical Impression:  1. Diabetic foot infection (HCC)    2. Cellulitis of right lower extremity         Disposition:  Ic to ed  3/31/2024 12:22 pm    Follow-up:  No follow-up provider specified.        Medications Prescribed:  Discharge Medication List as of 3/31/2024 12:19 PM

## 2024-03-31 NOTE — ED INITIAL ASSESSMENT (HPI)
Patient from  for further evaluation of right great toe bleeding, swelling and discoloration that began Friday. Hx of partial amputation of great toe.

## 2024-03-31 NOTE — ED QUICK NOTES
Pt to ED from [facility] with c/o or worsening swelling, redness, and bleeding from right great toe that started on Friday. Pt denies trauma. Pt denies fever or chills. Pt with hx of diabetes but does not check her blood sugar at home on a regular basis. Pt able to ambulate by self with steady gait. Pt with hx of partial amputation to right great toe. IV established to RAC #[id number]. Awaiting labs and imaging. Will continue to monitor.

## 2024-03-31 NOTE — ED QUICK NOTES
Actions and side effects of Cefadroxil and Bactrim reviewed. Importance of Podiatry follow up reviewed. Pt and significant other verbalized understanding.

## 2024-03-31 NOTE — ED INITIAL ASSESSMENT (HPI)
Patient with hx of right great toe amputation about 7 years ago. She follows with podiatry and was told if she were ever to have bleeding on the toe that she should seek medical care.    Yesterday patient developed bleeding to the toe, along with change in color and swelling to the foot and ankle.     Patient with neuropathy to the area at baseline     She is a diabetic     No fever  No known trauma   No pus noted

## 2024-03-31 NOTE — DISCHARGE INSTRUCTIONS
Please call your podiatrist to make an appointment as soon as possible to be reevaluated.  Return to the ER if there is pus draining from the wound, fever, severe pain, rapid progression of swelling or redness or other concerns.

## 2024-03-31 NOTE — ED PROVIDER NOTES
Patient Seen in: [facility] Emergency Department      History     Chief Complaint   Patient presents with    Cellulitis     Stated Complaint: r toe pain (ADOIC)    Subjective:   HPI  63-year-old female with diabetes presenting for evaluation of right toe pain.   At the right great toe she had a partial hallux amputation in the past.  2 to 3 days ago she noted some redness to the tip of the toe.  Today morning she woke up and had some bleeding to the area and went to the immediate care.  There was no purulent drainage.  No fevers.  No trauma.  She denies any swelling or change in size of the great toe.  She does not check her blood glucose regularly.      Objective:   Past Medical History:   Diagnosis Date    Acute iritis 09/26/2008    NG:  OD    After-cataract of left eye with vision obscured 12/22/2015    Cataract 2006    NG: OD    Cataract 2006    NG:  OS    Episcleritis 12/14/2009    NG:  OD    Ganglion 10/22/2013    NG: Right thumb    High blood pressure     Iritis 12/14/2009    NG: OD    Myopia with astigmatism and presbyopia 2008    NG:  OU    Ocular hypertension 2008    NG:  OS    Ophthalmological disorder     NG:  Macula pigmentation changes, OS, 2006    Type II or unspecified type diabetes mellitus without mention of complication, not stated as uncontrolled     NG:  No retinopathy, OU, 2006    Type II or unspecified type diabetes mellitus without mention of complication, not stated as uncontrolled     NG:  No retinopathy, OU, 2009    Type II or unspecified type diabetes mellitus without mention of complication, not stated as uncontrolled     NG:  No retinopathy, OU, 2010    Type II or unspecified type diabetes mellitus without mention of complication, not stated as uncontrolled     NG:  No retinopathy, OU, 2011    Type II or unspecified type diabetes mellitus without mention of complication, not stated as uncontrolled     NG: No retinopathy, OU, 2014              Past Surgical History:   Procedure  Laterality Date          CATARACT EXTRACTION W/  INTRAOCULAR LENS IMPLANT Right 14     Phaco w/ PC IOL/general/vision blue/ RJM    CATARACT EXTRACTION W/  INTRAOCULAR LENS IMPLANT Left 14    Phaco with PC IOL/ general/ Vision Blue/ RJM    COLONOSCOPY N/A 10/26/2022    Procedure: COLONOSCOPY;  Surgeon: Kathryn Ng MD;  Location: [facility]    OTHER SURGICAL HISTORY      NG: Bunion/hammer toes surgery     OTHER SURGICAL HISTORY      NG:  Excision ganglion, right thumb, 10/22/2013 - Lyle Seaman     YAG CAPSULOTOMY - OD - RIGHT EYE Right 2016    Dr. Andrade    YAG CAPSULOTOMY - OS - LEFT EYE Left 16    RJM                Social History     Socioeconomic History    Marital status:    Tobacco Use    Smoking status: Never    Smokeless tobacco: Never   Vaping Use    Vaping Use: Never used   Substance and Sexual Activity    Alcohol use: Yes     Comment:   Occasionally     Drug use: No   Other Topics Concern    Caffeine Concern Yes              Review of Systems    Positive for stated complaint: r toe pain (ADOIC)  Other systems are as noted in HPI.  Constitutional and vital signs reviewed.      All other systems reviewed and negative except as noted above.    Physical Exam     ED Triage Vitals [24 1317]   /81   Pulse 55   Resp 20   Temp 98.2 °F (36.8 °C)   Temp src Oral   SpO2 100 %   O2 Device None (Room air)       Current:/67   Pulse 51   Temp 98.2 °F (36.8 °C) (Oral)   Resp 18   Ht 167.6 cm (5' 6\")   Wt 72.6 kg   LMP  (LMP Unknown)   SpO2 98%   BMI 25.82 kg/m²         Physical Exam  Vitals and nursing note reviewed.   Constitutional:       Appearance: She is well-developed.   HENT:      Head: Normocephalic and atraumatic.   Eyes:      Extraocular Movements: Extraocular movements intact.   Cardiovascular:      Rate and Rhythm: Normal rate and regular rhythm.      Heart sounds: Normal heart sounds.      Comments: Bilateral DP and PT pulses are  2+  Pulmonary:      Effort: Pulmonary effort is normal.      Breath sounds: Normal breath sounds.   Abdominal:      General: There is no distension.      Palpations: Abdomen is soft.      Tenderness: There is no abdominal tenderness.   Musculoskeletal:         General: Normal range of motion.      Cervical back: Normal range of motion.   Feet:      Comments: There is a well-healed right partial hallux amputation.at the tip of the toe there is a hardened, cracked callus with scant blood visualized underneath. The R great toe is eythematous and warm with erythema extending to the dorsal aspect of the base of the great toe.  Skin:     General: Skin is warm.      Capillary Refill: Capillary refill takes less than 2 seconds.   Neurological:      Mental Status: She is alert.      Comments: No focal deficits         Differential diagnosis includes but is not limited to cellulitis, osteomyelitis, abscess    ED Course     Labs Reviewed   BASIC METABOLIC PANEL (8) - Abnormal; Notable for the following components:       Result Value    Glucose 126 (*)     Calculated Osmolality 296 (*)     All other components within normal limits   CBC WITH DIFFERENTIAL WITH PLATELET    Narrative:     The following orders were created for panel order CBC With Differential With Platelet.  Procedure                               Abnormality         Status                     ---------                               -----------         ------                     CBC W/ DIFFERENTIAL[[id number]]                              Final result                 Please view results for these tests on the individual orders.   RAINBOW DRAW BLUE   AEROBIC BACTERIAL CULTURE   CBC W/ DIFFERENTIAL          ED Course as of 03/31/24 1601  ------------------------------------------------------------  Time: 03/31 1410  Comment: CBC and BMP unremarkable     XR TOE(S) (MIN 2 VIEWS), RIGHT 1ST (CPT=73660)    Result Date: 3/31/2024  CONCLUSION:  1.  Soft tissue inflammation  in the right 1st toe which may be due to presence of wound/cellulitis, scar, or unrecognized trauma. 2.  Distal half of the right 1st distal phalanx is absent, which may be due to bone resorption, resection, or remote trauma. 3.  Old fracture deformities in the neck of the 1st and 5th metatarsals.  Orthopedic screw through the 5th metatarsal neck.  Dictated by (CST): Robin Gunter MD on 3/31/2024 at 2:32 PM     Finalized by (CST): Robin Gunter MD on 3/31/2024 at 2:34 PM                  [facility]                                       Medical Decision Making  Patient is well-appearing without systemic symptoms of illness.  No evidence of neurovascular compromise.  Labs unremarkable.  Per my independent interpretation of right great toe x-ray there is soft tissue inflammation.  Will plan for a course of oral antibiotics and close outpatient follow-up in podiatry clinic.  I spoke with her podiatrist Dr. Dwyer will kindly see her within the next 2 to 3 days and also arrange for ID follow-up if needed.  Patient is agreeable with the plan.  Return precautions discussed.    Problems Addressed:  Cellulitis of great toe of right foot: complicated acute illness or injury with systemic symptoms    Amount and/or Complexity of Data Reviewed  External Data Reviewed: notes.     Details: Reviewed immediate care note from earlier today which prompted this ER visit  Labs: ordered. Decision-making details documented in ED Course.  Radiology: ordered and independent interpretation performed. Decision-making details documented in ED Course.  Discussion of management or test interpretation with external provider(s): As above    Risk  Prescription drug management.  Decision regarding hospitalization.        Disposition and Plan     Clinical Impression:  1. Cellulitis of great toe of right foot         Disposition:  Discharge  3/31/2024  2:51 pm    Follow-up:  Batsheva Dwyer DPM  [address]  [address]  [phone]    Follow up  Call to make an appointment soon as possible          Medications Prescribed:  Discharge Medication List as of 3/31/2024  3:21 PM        START taking these medications    Details   sulfamethoxazole-trimethoprim -160 MG Oral Tab per tablet Take 1 tablet by mouth 2 (two) times daily for 7 days., Normal, Disp-14 tablet, R-0      cefadroxil 500 MG Oral Cap Take 1 capsule (500 mg total) by mouth 2 (two) times daily for 7 days., Normal, Disp-14 capsule, R-0

## 2024-04-01 ENCOUNTER — TELEPHONE (OUTPATIENT)
Dept: PODIATRY CLINIC | Facility: CLINIC | Age: 64
End: 2024-04-01

## 2024-04-01 RX ORDER — LISINOPRIL 5 MG/1
5 TABLET ORAL DAILY
Qty: 90 TABLET | Refills: 1 | Status: SHIPPED | OUTPATIENT
Start: 2024-04-01

## 2024-04-01 NOTE — TELEPHONE ENCOUNTER
Pleases schedule her for tomorrow. Spoke to the ED doctor this weekend and I am informed of the pt

## 2024-04-01 NOTE — TELEPHONE ENCOUNTER
Pt calling per pt was told by Dr Dwyer to schedule an appt for tomorrow Tuesday.No openings.Please advise

## 2024-04-01 NOTE — TELEPHONE ENCOUNTER
S/w patient and added her for appointment at 1pm on 4/2/24 at [facility]. She accepted.

## 2024-04-01 NOTE — TELEPHONE ENCOUNTER
Patient calling stating they were seen in ED yesterday 03.31.24 and per patient doctor would like to see them today. Wound on foot is infected per patient. Please advise.

## 2024-04-01 NOTE — TELEPHONE ENCOUNTER
Endocrine Refill protocol for oral antihypertensive medications    Protocol Criteria:    -Appointment with Endocrinology completed in the last 6 months or scheduled in the next 3 months    -BMP or CMP has been completed in the last 12 months     -GFR is greater than or equal to 50    Verify the above has been completed or scheduled in the appropriate timeline. If so can send a 90 day supply with 1 refill.   Verify BMP or CMP has been completed in last year  Verify last GFR result     Last completed office visit:10/23/23  Next scheduled Follow up: no f/u appt   Last BMP or CMP completion date:3/17/24  GFR result:90       Most babies do not have fever with the vaccines she received today, but a few babies will. In case of a fever (>100.4F), give 3.5ml of Tylenol every 4-6 hours as needed  - Call if fever is greater than 101F or lasts longer than 48 hours.  - Call if severe lethargy or abnormal movements develops

## 2024-04-01 NOTE — TELEPHONE ENCOUNTER
Pt seen in ER on 3/31/24 for right great toe cellulitis. Your next opening is Friday 4/5. Please advise if pt needs to be added on sooner?

## 2024-04-02 ENCOUNTER — OFFICE VISIT (OUTPATIENT)
Dept: PODIATRY CLINIC | Facility: CLINIC | Age: 64
End: 2024-04-02

## 2024-04-02 DIAGNOSIS — E11.42 TYPE 2 DIABETES MELLITUS WITH DIABETIC POLYNEUROPATHY, WITHOUT LONG-TERM CURRENT USE OF INSULIN (HCC): ICD-10-CM

## 2024-04-02 DIAGNOSIS — Z89.419 HISTORY OF AMPUTATION OF GREAT TOE (HCC): Primary | ICD-10-CM

## 2024-04-02 PROCEDURE — L3260 AMBULATORY SURGICAL BOOT EAC: HCPCS | Performed by: STUDENT IN AN ORGANIZED HEALTH CARE EDUCATION/TRAINING PROGRAM

## 2024-04-02 PROCEDURE — 99214 OFFICE O/P EST MOD 30 MIN: CPT | Performed by: STUDENT IN AN ORGANIZED HEALTH CARE EDUCATION/TRAINING PROGRAM

## 2024-04-02 NOTE — PROGRESS NOTES
ED Culture Callback Results Review    Pharmacist reviewed culture results from ED visit .    Final wound culture positive for mixed organisms suggestive of normal skin jennifer. Patient was prescribed Cefadroxil (Duricef) and Sulfamethoxazole/Trimethoprim (Bactrim) on discharge. Current therapy is appropriate based on reported susceptibilities. No further intervention required at this time.      Devon Platt PharmD  Emergency Medicine Pharmacist Specialist  04/02/24; 2:13 PM

## 2024-04-07 NOTE — PROGRESS NOTES
[facility] Podiatry  Progress Note      Flora Moraes is a 63 year old female.   Chief Complaint   Patient presents with    Follow - Up     ER follow up - right great toe - bleeding has stopped - denies pain - some swelling              HPI:     Patient is a 63-year-old diabetic female who presents to clinic for follow-up after discharged from the emergency department for an ulceration to the right great toe..  Patient has history of right great toe partial amputation patient developed a wound.  A partial right hallux.  He was placed on oral antibiotics and has been taking them as directed.    Allergies: Ceftriaxone    Current Outpatient Medications   Medication Sig Dispense Refill    lisinopril 5 MG Oral Tab Take 1 tablet (5 mg total) by mouth daily. 90 tablet 1    sulfamethoxazole-trimethoprim -160 MG Oral Tab per tablet Take 1 tablet by mouth 2 (two) times daily for 7 days. 14 tablet 0    cefadroxil 500 MG Oral Cap Take 1 capsule (500 mg total) by mouth 2 (two) times daily for 7 days. 14 capsule 0    latanoprost 0.005 % Ophthalmic Solution INSTILL 1 DROP INTO BOTH EYES EVERY MORNING 3 each 3    dapagliflozin (FARXIGA) 10 MG Oral Tab Take 1 tablet (10 mg total) by mouth daily. 90 tablet 1    glimepiride 4 MG Oral Tab Take 1 tablet (4 mg total) by mouth before breakfast. 90 tablet 1    metFORMIN  MG Oral Tablet 24 Hr Take 2 tablets (1,000 mg total) by mouth 2 (two) times daily with meals. 360 tablet 1    OneTouch UltraSoft Lancets Does not apply Misc Test 1x daily 100 each 1    Glucose Blood (ONETOUCH ULTRA) In Vitro Strip Test 1x daily 100 each 1    ACCU-CHEK SOFTCLIX LANCETS Does not apply Misc   0    Glucose Blood In Vitro Strip place 1 by Intradermal route  every day        Past Medical History:   Diagnosis Date    Acute iritis 09/26/2008    NG:  OD    After-cataract of left eye with vision obscured 12/22/2015    Cataract 2006    NG: OD    Cataract 2006    NG:  OS    Episcleritis  2009    NG:  OD    Ganglion 10/22/2013    NG: Right thumb    High blood pressure     Iritis 2009    NG: OD    Myopia with astigmatism and presbyopia     NG:  OU    Ocular hypertension     NG:  OS    Ophthalmological disorder     NG:  Macula pigmentation changes, OS,     Type II or unspecified type diabetes mellitus without mention of complication, not stated as uncontrolled     NG:  No retinopathy, OU,     Type II or unspecified type diabetes mellitus without mention of complication, not stated as uncontrolled     NG:  No retinopathy, OU,     Type II or unspecified type diabetes mellitus without mention of complication, not stated as uncontrolled     NG:  No retinopathy, OU,     Type II or unspecified type diabetes mellitus without mention of complication, not stated as uncontrolled     NG:  No retinopathy, OU,     Type II or unspecified type diabetes mellitus without mention of complication, not stated as uncontrolled     NG: No retinopathy, OU,       Past Surgical History:   Procedure Laterality Date          CATARACT EXTRACTION W/  INTRAOCULAR LENS IMPLANT Right 14     Phaco w/ PC IOL/general/vision blue/ RJM    CATARACT EXTRACTION W/  INTRAOCULAR LENS IMPLANT Left 14    Phaco with PC IOL/ general/ Vision Blue/ RJM    COLONOSCOPY N/A 10/26/2022    Procedure: COLONOSCOPY;  Surgeon: Kathryn Ng MD;  Location: [facility]    OTHER SURGICAL HISTORY      NG: Bunion/hammer toes surgery     OTHER SURGICAL HISTORY      NG:  Excision ganglion, right thumb, 10/22/2013 - Lyle Seaman     YAG CAPSULOTOMY - OD - RIGHT EYE Right 2016    Dr. Andrade    YAG CAPSULOTOMY - OS - LEFT EYE Left 16    RJM      Family History   Problem Relation Age of Onset    Cancer Father         NG: Esophageal    Heart Disease Mother         NG:  CAD    Cancer Mother     Diabetes Mother     Psychiatric Paternal Grandmother     Ovarian Cancer Paternal Grandmother      Breast Cancer Cousin     Ovarian Cancer Paternal Cousin Female     Breast Cancer Paternal Cousin Female         pre isidro    Glaucoma Neg     Macular degeneration Neg       Social History     Socioeconomic History    Marital status:    Tobacco Use    Smoking status: Never    Smokeless tobacco: Never   Vaping Use    Vaping Use: Never used   Substance and Sexual Activity    Alcohol use: Yes     Comment:   Occasionally     Drug use: No   Other Topics Concern    Caffeine Concern Yes           REVIEW OF SYSTEMS:     Denies nause, fever, chills  No calf pain  Denies chest pain or SOB      EXAM:   LMP  (LMP Unknown)   GENERAL: well developed, well nourished, in no apparent distress  EXTREMITIES:   1. Integument: Normal skin temperature and turgor.  Partial right hallux amputation with an ulceration to the right great toe stump measuring 0.9 x 0.9 x 0.3 cm wound with mild maceration and fibrogranular wound bed.  2. Vascular: Dorsalis pedis two out of four bilateral and posterior tibial pulses two out of   four bilateral, capillary refill normal.   3. Musculoskeletal: Partial right hallux amputation   4. Neurological: Normal sharp dull sensation; reflexes normal.      XR TOE(S) (MIN 2 VIEWS), RIGHT 1ST (CPT=73660)    Result Date: 3/31/2024  CONCLUSION:  1.  Soft tissue inflammation in the right 1st toe which may be due to presence of wound/cellulitis, scar, or unrecognized trauma. 2.  Distal half of the right 1st distal phalanx is absent, which may be due to bone resorption, resection, or remote trauma. 3.  Old fracture deformities in the neck of the 1st and 5th metatarsals.  Orthopedic screw through the 5th metatarsal neck.  Dictated by (CST): Robin Gunter MD on 3/31/2024 at 2:32 PM     Finalized by (CST): Robin Gunter MD on 3/31/2024 at 2:34 PM            ASSESSMENT AND PLAN:   Diagnoses and all orders for this visit:    History of amputation of great toe (HCC)    Type 2 diabetes mellitus with diabetic  polyneuropathy, without long-term current use of insulin (HCC)        Plan:     Patient seen and examined and findings discussed with patient.  Discussed the importance of daily foot checks and performing wound dressing changes as instructed.  After cleansing the right great toe also correction with sterile 15 blade alcohol the wound was dressed with Betadine ointment and dry sterile dressing.  Advised patient to perform daily dressing changes as instructed.  Advised patient to complete her oral antibiotics as instructed.  Return to clinic in 1 week for follow-up.    The patient indicates understanding of these issues and agrees to the plan.        Batsheva Dwyer DPM

## 2024-04-09 ENCOUNTER — OFFICE VISIT (OUTPATIENT)
Dept: PODIATRY CLINIC | Facility: CLINIC | Age: 64
End: 2024-04-09

## 2024-04-09 DIAGNOSIS — Z89.419 HISTORY OF AMPUTATION OF GREAT TOE (HCC): Primary | ICD-10-CM

## 2024-04-09 DIAGNOSIS — E11.42 TYPE 2 DIABETES MELLITUS WITH DIABETIC POLYNEUROPATHY, WITHOUT LONG-TERM CURRENT USE OF INSULIN (HCC): ICD-10-CM

## 2024-04-09 DIAGNOSIS — Z87.2 HEALED ULCER OF FOOT ON EXAMINATION: ICD-10-CM

## 2024-04-09 PROCEDURE — 99213 OFFICE O/P EST LOW 20 MIN: CPT | Performed by: STUDENT IN AN ORGANIZED HEALTH CARE EDUCATION/TRAINING PROGRAM

## 2024-04-09 NOTE — PROGRESS NOTES
[facility] Podiatry  Progress Note      Flora Moraes is a 63 year old female.   Chief Complaint   Patient presents with    Follow - Up     1 week follow up- right great toe- States that she has been doing dressing changes every other day. Completed oral antibioitcs. Denies pain.              HPI:     Patient is a 63-year-old female presents to clinic for follow-up of right great toe ulceration.  She is not performing the dressing changes as instructed.  She has completed her oral antibiotics.  Denies any worsening signs or symptoms of the right foot ulceration.    Allergies: Ceftriaxone    Current Outpatient Medications   Medication Sig Dispense Refill    lisinopril 5 MG Oral Tab Take 1 tablet (5 mg total) by mouth daily. 90 tablet 1    latanoprost 0.005 % Ophthalmic Solution INSTILL 1 DROP INTO BOTH EYES EVERY MORNING 3 each 3    dapagliflozin (FARXIGA) 10 MG Oral Tab Take 1 tablet (10 mg total) by mouth daily. 90 tablet 1    glimepiride 4 MG Oral Tab Take 1 tablet (4 mg total) by mouth before breakfast. 90 tablet 1    metFORMIN  MG Oral Tablet 24 Hr Take 2 tablets (1,000 mg total) by mouth 2 (two) times daily with meals. 360 tablet 1    OneTouch UltraSoft Lancets Does not apply Misc Test 1x daily 100 each 1    Glucose Blood (ONETOUCH ULTRA) In Vitro Strip Test 1x daily 100 each 1    ACCU-CHEK SOFTCLIX LANCETS Does not apply Misc   0    Glucose Blood In Vitro Strip place 1 by Intradermal route  every day        Past Medical History:   Diagnosis Date    Acute iritis 09/26/2008    NG:  OD    After-cataract of left eye with vision obscured 12/22/2015    Cataract 2006    NG: OD    Cataract 2006    NG:  OS    Episcleritis 12/14/2009    NG:  OD    Ganglion 10/22/2013    NG: Right thumb    High blood pressure     Iritis 12/14/2009    NG: OD    Myopia with astigmatism and presbyopia 2008    NG:  OU    Ocular hypertension 2008    NG:  OS    Ophthalmological disorder     NG:  Macula pigmentation  changes, OS,     Type II or unspecified type diabetes mellitus without mention of complication, not stated as uncontrolled     NG:  No retinopathy, OU, 2006    Type II or unspecified type diabetes mellitus without mention of complication, not stated as uncontrolled     NG:  No retinopathy, OU, 2009    Type II or unspecified type diabetes mellitus without mention of complication, not stated as uncontrolled     NG:  No retinopathy, OU, 2010    Type II or unspecified type diabetes mellitus without mention of complication, not stated as uncontrolled     NG:  No retinopathy, OU,     Type II or unspecified type diabetes mellitus without mention of complication, not stated as uncontrolled     NG: No retinopathy, OU,       Past Surgical History:   Procedure Laterality Date          CATARACT EXTRACTION W/  INTRAOCULAR LENS IMPLANT Right 14     Phaco w/ PC IOL/general/vision blue/ RJM    CATARACT EXTRACTION W/  INTRAOCULAR LENS IMPLANT Left 14    Phaco with PC IOL/ general/ Vision Blue/ RJM    COLONOSCOPY N/A 10/26/2022    Procedure: COLONOSCOPY;  Surgeon: Kathryn Ng MD;  [facility]    OTHER SURGICAL HISTORY      NG: Bunion/hammer toes surgery     OTHER SURGICAL HISTORY      NG:  Excision ganglion, right thumb, 10/22/2013 - Lyle Seaman     YAG CAPSULOTOMY - OD - RIGHT EYE Right 2016    Dr. Andrade    YAG CAPSULOTOMY - OS - LEFT EYE Left 16    RJM      Family History   Problem Relation Age of Onset    Cancer Father         NG: Esophageal    Heart Disease Mother         NG:  CAD    Cancer Mother     Diabetes Mother     Psychiatric Paternal Grandmother     Ovarian Cancer Paternal Grandmother     Breast Cancer Cousin     Ovarian Cancer Paternal Cousin Female     Breast Cancer Paternal Cousin Female         pre isidro    Glaucoma Neg     Macular degeneration Neg       Social History     Socioeconomic History    Marital status:    Tobacco Use    Smoking status: Never     Smokeless tobacco: Never   Vaping Use    Vaping Use: Never used   Substance and Sexual Activity    Alcohol use: Yes     Comment:   Occasionally     Drug use: No   Other Topics Concern    Caffeine Concern Yes           REVIEW OF SYSTEMS:     Denies nause, fever, chills  No calf pain  Denies chest pain or SOB      EXAM:   LMP  (LMP Unknown)   GENERAL: well developed, well nourished, in no apparent distress  EXTREMITIES:   1. Integument: Normal skin temperature and turgor.  Partial right hallux amputation the ulceration is healed with no other acute signs of infection present to the right hallux  2. Vascular: Dorsalis pedis two out of four bilateral and posterior tibial pulses two out of   four bilateral, capillary refill normal.   3. Musculoskeletal: Partial right hallux amputation   4. Neurological: Normal sharp dull sensation; reflexes normal.      XR TOE(S) (MIN 2 VIEWS), RIGHT 1ST (CPT=73660)    Result Date: 3/31/2024  CONCLUSION:  1.  Soft tissue inflammation in the right 1st toe which may be due to presence of wound/cellulitis, scar, or unrecognized trauma. 2.  Distal half of the right 1st distal phalanx is absent, which may be due to bone resorption, resection, or remote trauma. 3.  Old fracture deformities in the neck of the 1st and 5th metatarsals.  Orthopedic screw through the 5th metatarsal neck.  Dictated by (CST): Robin Gunter MD on 3/31/2024 at 2:32 PM     Finalized by (CST): Robin Gunter MD on 3/31/2024 at 2:34 PM            ASSESSMENT AND PLAN:   Diagnoses and all orders for this visit:    History of amputation of great toe (HCC)    Type 2 diabetes mellitus with diabetic polyneuropathy, without long-term current use of insulin (HCC)    Healed ulcer of foot on examination        Plan:     Patient seen and examined and findings discussed with patient.  Informed patient that the ulcer site is healed with no other acute signs of infection present.  Advised her to continue keeping the toe covered for 1  more week with iodine and a Band-Aid.  She may resume ambulating in supportive shoes.  Educated the patient again on acute signs of infection and instructed her to seek immediate medical attention if symptoms arise.  Toenails x 9 were trimmed using sterile nail nippers with 20% reduction in length and thickness.  Return to clinic in 2 months for reevaluation.    The patient indicates understanding of these issues and agrees to the plan.        Batsheva Dwyer DPM

## 2024-04-14 NOTE — TELEPHONE ENCOUNTER
LOV: 10/23/2023     Next office visit: no apt      Last filled: 03/17/2024        Order pended and routed to provider

## 2024-04-15 RX ORDER — METFORMIN HYDROCHLORIDE 500 MG/1
1000 TABLET, EXTENDED RELEASE ORAL 2 TIMES DAILY WITH MEALS
Qty: 360 TABLET | Refills: 0 | Status: SHIPPED | OUTPATIENT
Start: 2024-04-15

## 2024-05-23 RX ORDER — GLIMEPIRIDE 4 MG/1
4 TABLET ORAL
Qty: 90 TABLET | Refills: 0 | Status: SHIPPED | OUTPATIENT
Start: 2024-05-23

## 2024-05-23 NOTE — TELEPHONE ENCOUNTER
Endocrine Refill protocol for oral and injectable diabetic medications    Protocol Criteria:    -Appointment with Endocrinology completed in the last 6 months or scheduled in the next 3 months    -A1c result <8.5% in the past 6 months      Verify the above has been completed or scheduled in the appropriate timeline. If so can send a 90 day supply with 1 refill.     Last completed office visit:10/23/23  Next scheduled Follow up: no future appt lmtcb     Last A1c result: Last A1c value was 6.3% done 10/23/2023.

## 2024-06-04 ENCOUNTER — OFFICE VISIT (OUTPATIENT)
Dept: PODIATRY CLINIC | Facility: CLINIC | Age: 64
End: 2024-06-04

## 2024-06-04 DIAGNOSIS — Z89.419 HISTORY OF AMPUTATION OF GREAT TOE (HCC): ICD-10-CM

## 2024-06-04 DIAGNOSIS — E11.42 TYPE 2 DIABETES MELLITUS WITH DIABETIC POLYNEUROPATHY, WITHOUT LONG-TERM CURRENT USE OF INSULIN (HCC): Primary | ICD-10-CM

## 2024-06-04 DIAGNOSIS — L60.3 NAIL DYSTROPHY: ICD-10-CM

## 2024-06-04 PROCEDURE — 99213 OFFICE O/P EST LOW 20 MIN: CPT | Performed by: STUDENT IN AN ORGANIZED HEALTH CARE EDUCATION/TRAINING PROGRAM

## 2024-06-04 NOTE — PROGRESS NOTES
[facility]  Progress Note      Flora Moraes is a 63 year old female.   Chief Complaint   Patient presents with    Follow - Up     Right Great toe. Patient denies any pain at this time. She complains of some discomfort.              HPI:     Patient is a 63-year-old diabetic female presents to clinic for bilateral foot evaluation.  Denies any injuries and denies any acute signs of infection to bilateral feet      Allergies: Ceftriaxone    Current Outpatient Medications   Medication Sig Dispense Refill    GLIMEPIRIDE 4 MG Oral Tab Take 1 tablet (4 mg total) by mouth before breakfast. 90 tablet 0    metFORMIN  MG Oral Tablet 24 Hr Take 2 tablets (1,000 mg total) by mouth 2 (two) times daily with meals. 360 tablet 0    lisinopril 5 MG Oral Tab Take 1 tablet (5 mg total) by mouth daily. 90 tablet 1    latanoprost 0.005 % Ophthalmic Solution INSTILL 1 DROP INTO BOTH EYES EVERY MORNING 3 each 3    dapagliflozin (FARXIGA) 10 MG Oral Tab Take 1 tablet (10 mg total) by mouth daily. 90 tablet 1    OneTouch UltraSoft Lancets Does not apply Misc Test 1x daily 100 each 1    Glucose Blood (ONETOUCH ULTRA) In Vitro Strip Test 1x daily 100 each 1    ACCU-CHEK SOFTCLIX LANCETS Does not apply Misc   0    Glucose Blood In Vitro Strip place 1 by Intradermal route  every day        Past Medical History:    Acute iritis    NG:  OD    After-cataract of left eye with vision obscured    Cataract    NG: OD    Cataract    NG:  OS    Episcleritis    NG:  OD    Ganglion    NG: Right thumb    High blood pressure    Iritis    NG: OD    Myopia with astigmatism and presbyopia    NG:  OU    Ocular hypertension    NG:  OS    Ophthalmological disorder    NG:  Macula pigmentation changes, OS, 2006    Type II or unspecified type diabetes mellitus without mention of complication, not stated as uncontrolled    NG:  No retinopathy, OU, 2006    Type II or unspecified type diabetes mellitus without mention of complication, not  stated as uncontrolled    NG:  No retinopathy, OU,     Type II or unspecified type diabetes mellitus without mention of complication, not stated as uncontrolled    NG:  No retinopathy, OU,     Type II or unspecified type diabetes mellitus without mention of complication, not stated as uncontrolled    NG:  No retinopathy, OU,     Type II or unspecified type diabetes mellitus without mention of complication, not stated as uncontrolled    NG: No retinopathy, OU,       Past Surgical History:   Procedure Laterality Date          Cataract extraction w/  intraocular lens implant Right 14     Phaco w/ PC IOL/general/vision blue/ RJM    Cataract extraction w/  intraocular lens implant Left 14    Phaco with PC IOL/ general/ Vision Blue/ RJM    Colonoscopy N/A 10/26/2022    Procedure: COLONOSCOPY;  Surgeon: Kathryn Ng MD;  Location: [facility]    Other surgical history      NG: Bunion/hammer toes surgery     Other surgical history      NG:  Excision ganglion, right thumb, 10/22/2013 - Lyle Seaman     Yag capsulotomy - od - right eye Right 2016    Dr. Andrade    Yag capsulotomy - os - left eye Left 16    RJ      Family History   Problem Relation Age of Onset    Cancer Father         NG: Esophageal    Heart Disease Mother         NG:  CAD    Cancer Mother     Diabetes Mother     Psychiatric Paternal Grandmother     Ovarian Cancer Paternal Grandmother     Breast Cancer Cousin     Ovarian Cancer Paternal Cousin Female     Breast Cancer Paternal Cousin Female         pre isidro    Glaucoma Neg     Macular degeneration Neg       Social History     Socioeconomic History    Marital status:    Tobacco Use    Smoking status: Never    Smokeless tobacco: Never   Vaping Use    Vaping status: Never Used   Substance and Sexual Activity    Alcohol use: Yes     Comment:   Occasionally     Drug use: No   Other Topics Concern    Caffeine Concern Yes           REVIEW OF SYSTEMS:      Denies nause, fever, chills  No calf pain  Denies chest pain or SOB      EXAM:   LMP  (LMP Unknown)   GENERAL: well developed, well nourished, in no apparent distress  EXTREMITIES:   1. Integument: Normal skin temperature and turgor. Toenails x 9 are elongated, thickened and discolored with subungal derbi.     2. Vascular: Dorsalis pedis two out of four bilateral and posterior tibial pulses two out of   four bilateral, capillary refill normal.   3. Musculoskeletal: All muscle groups are graded 5 out of 5 in the foot and ankle.  Status post partial right hallux amputation   4. Neurological: Normal sharp dull sensation; reflexes normal.             ASSESSMENT AND PLAN:   Diagnoses and all orders for this visit:    Type 2 diabetes mellitus with diabetic polyneuropathy, without long-term current use of insulin (HCC)    History of amputation of great toe (HCC)    Nail dystrophy        Plan:       -Patient examined, chart history reviewed.  -Discussed importance of proper pedal hygiene, regular foot checks, and tight glucose control.  -Sharply debrided nails x 9 with a sterile nail nipper achieving a 20% reduction in thickness and length, without incident.   -Ambulate with supportive shoes and inserts and avoid walking barefoot.  -Educated patient on acute signs of infection advised patient to seek immediate medical attention if symptoms arise.    RTC in 3 months      The patient indicates understanding of these issues and agrees to the plan.        Batsheva Dwyer DPM

## 2024-07-05 ENCOUNTER — TELEPHONE (OUTPATIENT)
Dept: PODIATRY CLINIC | Facility: CLINIC | Age: 64
End: 2024-07-05

## 2024-07-05 ENCOUNTER — HOSPITAL ENCOUNTER (OUTPATIENT)
Age: 64
Discharge: HOME | End: 2024-07-05
Payer: COMMERCIAL

## 2024-07-05 VITALS
OXYGEN SATURATION: 98 % | DIASTOLIC BLOOD PRESSURE: 69 MMHG | RESPIRATION RATE: 16 BRPM | HEART RATE: 50 BPM | TEMPERATURE: 97 F | SYSTOLIC BLOOD PRESSURE: 123 MMHG

## 2024-07-05 DIAGNOSIS — S91.301A OPEN WOUND OF RIGHT FOOT, INITIAL ENCOUNTER: Primary | ICD-10-CM

## 2024-07-05 DIAGNOSIS — T14.8XXA WOUND INFECTION: ICD-10-CM

## 2024-07-05 DIAGNOSIS — L08.9 WOUND INFECTION: ICD-10-CM

## 2024-07-05 PROCEDURE — 99213 OFFICE O/P EST LOW 20 MIN: CPT | Performed by: PHYSICIAN ASSISTANT

## 2024-07-05 RX ORDER — SULFAMETHOXAZOLE AND TRIMETHOPRIM 800; 160 MG/1; MG/1
1 TABLET ORAL 2 TIMES DAILY
Qty: 14 TABLET | Refills: 0 | Status: SHIPPED | OUTPATIENT
Start: 2024-07-05 | End: 2024-07-12

## 2024-07-05 NOTE — ED INITIAL ASSESSMENT (HPI)
Pt sts that she bumped her right foot in her bed a week ago c/o wound to top of her foot + redness around it, denies fever

## 2024-07-05 NOTE — TELEPHONE ENCOUNTER
How Severe Is Your Acne?: mild Pt called stating pt has a wound on the top right foot for one and half weeks.  Pt is a diabetic.  Caller requesting a sooner appointment.  Please call     Is This A New Presentation, Or A Follow-Up?: Acne

## 2024-07-05 NOTE — ED PROVIDER NOTES
Patient Seen in: [facility]      History     Chief Complaint   Patient presents with    Wound Care     Stated Complaint: R Foot Injury    Subjective:   HPI    Patient is a 63-year-old female with past medical history of diabetes that presents to immediate care due to wound on right foot x 1 week.  Patient states that she accidentally hit her foot on a bedpost 1 week ago.  Notes that she has had increased erythema, warmth surrounding wound.  Patient notes scant discharge yesterday.  Contacted her PCP office today who instructed her to go to urgent care for further evaluation.  Patient denies fever, chills, pain however does note that she has peripheral neuropathy secondary to diabetes.    Objective:   No pertinent past medical history.            No pertinent past surgical history.              No pertinent social history.            Review of Systems    Positive for stated Chief Complaint: Wound Care    Other systems are as noted in HPI.  Constitutional and vital signs reviewed.      All other systems reviewed and negative except as noted above.    Physical Exam     ED Triage Vitals [07/05/24 1059]   /69   Pulse 50   Resp 16   Temp 97 °F (36.1 °C)   Temp src Temporal   SpO2 98 %   O2 Device None (Room air)       Current Vitals:   Vital Signs  BP: 123/69  Pulse: 50  Resp: 16  Temp: 97 °F (36.1 °C)  Temp src: Temporal    Oxygen Therapy  SpO2: 98 %  O2 Device: None (Room air)            Physical Exam    Vital signs reviewed. Nursing note reviewed.  Constitutional: Well-developed. Well-nourished. In no acute distress  HENT: Mucous membranes moist.   EYES: No scleral icterus or conjunctival injection.  NECK: Full ROM. Supple.   CARDIAC: Normal rate.   PULM/CHEST: No wheezes  Extremities: Full ROM  NEURO: Awake, alert, following commands, moving extremities, answering questions.   SKIN: Warm and dry.  1 cm circular wound over the dorsal aspect of the right foot with mild surrounding tenderness  erythema.  No active bleeding discharge.  Gait intact  PSYCH: Normal judgment. Normal affect.             MDM      Patient is a 63-year-old female with past medical history of diabetes that presents to immediate care due to wound of right foot worsening over the past week.  Patient arrives with stable vitals.  Physical exam showing 1 cm circular superficial wound on the dorsal aspect of the right foot with faint surrounding erythema.  Differential diagnosis include wound infection, cellulitis, delayed wound healing.  Due to history of diabetes will treat with Bactrim twice daily for 1 week.  Encourage patient to follow-up with PCP for wound rechecks.  Return protocols including increased redness, pain, purulent drainage, fever.  History given by patient.  Patient agreeable to plan all questions answered.                                   Medical Decision Making      Disposition and Plan     Clinical Impression:  1. Open wound of right foot, initial encounter    2. Wound infection         Disposition:  Discharge  7/5/2024 11:38 am    Follow-up:  Conor Dobbs DO  [address] [facility] [address]  [phone]    Schedule an appointment as soon as possible for a visit             Medications Prescribed:  Discharge Medication List as of 7/5/2024 11:39 AM

## 2024-07-05 NOTE — TELEPHONE ENCOUNTER
Patient calling again in regards to wound on foot that has not healed in over a week. Patient is diabetic and needing to be seen as soon as possible. Please advise.

## 2024-07-05 NOTE — TELEPHONE ENCOUNTER
Called patient and she states about 1 wk ago she developed a wound on the top of her foot. She got a new foot board on her bed and thinks she's been hitting in when she sleeps. She states wound is about the size of a nickel with redness around the wound,generalized swelling in the foot, and scant amount of yellow drainage without an odor. She denies any fever or chills. She has been applying neosporin with no improvement. Patient is diabetic. I recommended that patient be evaluated at urgent care today and to call back if she needs Follow up care. She verbalized understanding.

## 2024-07-12 RX ORDER — METFORMIN HYDROCHLORIDE 500 MG/1
1000 TABLET, EXTENDED RELEASE ORAL 2 TIMES DAILY WITH MEALS
Qty: 360 TABLET | Refills: 1 | Status: SHIPPED | OUTPATIENT
Start: 2024-07-12

## 2024-07-12 NOTE — TELEPHONE ENCOUNTER
Endocrine Refill protocol for metformin    Protocol Criteria:pass    -Appointment with Endocrinology completed in the last 6 months or scheduled in the next 3 months    -GFR greater than or equal to 40 in the past 12 months     -A1c result below 8.5% in the past 6 months      Verify the above has been completed or scheduled in the appropriate timeline. If so can send a 90 day supply with 1 refill.     Last completed office visit:10/23/2023 Sarah Ahuja MD   Next scheduled Follow up: 10/10/24      Last GFR result:   90    Last A1c result:6.3

## 2024-07-17 DIAGNOSIS — E11.8 CONTROLLED TYPE 2 DIABETES MELLITUS WITH COMPLICATION, WITHOUT LONG-TERM CURRENT USE OF INSULIN (HCC): Primary | ICD-10-CM

## 2024-07-17 NOTE — TELEPHONE ENCOUNTER
Patient needs refill is out of the medicine dapagliflozin (FARXIGA) 10 MG Oral Tab please follow up patient just needs the hard copy of the prescription to send to [facility]

## 2024-07-17 NOTE — TELEPHONE ENCOUNTER
Dr Ahuja,  Pt has not had appt in the last 6 months. Pt has appt scheduled in 10/24. Please approve (& print per pt request) if appropriate.     Endocrine Refill protocol for oral and injectable diabetic medications    Protocol Criteria: Failed    -Appointment with Endocrinology completed in the last 6 months or scheduled in the next 3 months    -A1c result below 8.5% in the past 6 months      Verify the above has been completed or scheduled in the appropriate timeline. If so can send a 90 day supply with 1 refill.     Last completed office visit: 10/23/2023 Sarah Ahuja MD   Next scheduled Follow up:   Future Appointments   Date Time Provider Department Center   7/25/2024  1:00 PM Lv Andrade MD [facility]   9/5/2024  3:00 PM Batsheva Dwyer DPM [facility]   10/10/2024  3:30 PM [facility]      Last A1c result: Last A1c value was 6.3% done 10/23/2023.

## 2024-07-18 ENCOUNTER — TELEPHONE (OUTPATIENT)
Facility: CLINIC | Age: 64
End: 2024-07-18

## 2024-07-18 RX ORDER — DAPAGLIFLOZIN 10 MG/1
10 TABLET, FILM COATED ORAL DAILY
Qty: 90 TABLET | Refills: 1 | Status: SHIPPED | OUTPATIENT
Start: 2024-07-18

## 2024-07-18 NOTE — TELEPHONE ENCOUNTER
Patient returning RN's call.  She states that she will  the prescription at [facility] today.    See 7/17 closed telephone encounter

## 2024-08-23 RX ORDER — GLIMEPIRIDE 4 MG/1
4 TABLET ORAL
Qty: 90 TABLET | Refills: 1 | Status: SHIPPED | OUTPATIENT
Start: 2024-08-23

## 2024-08-23 NOTE — TELEPHONE ENCOUNTER
Endocrine Refill protocol for oral and injectable diabetic medications    Protocol Criteria:  PASSED  Reason: N/A  -Appointment with Endocrinology completed in the last 6 months or scheduled in the next 3 months    -A1c result below 8.5% in the past 6 months      Verify the above has been completed or scheduled in the appropriate timeline. If so can send a 90 day supply with 1 refill.     Last completed office visit: 10/23/2023 Sarah Ahuja MD   Next scheduled Follow up: 10/10/24     Last A1c result: Last A1c value was 6.3% done 10/23/2023.

## 2024-09-05 ENCOUNTER — OFFICE VISIT (OUTPATIENT)
Dept: PODIATRY CLINIC | Facility: CLINIC | Age: 64
End: 2024-09-05

## 2024-09-05 DIAGNOSIS — L60.3 NAIL DYSTROPHY: ICD-10-CM

## 2024-09-05 DIAGNOSIS — E11.42 TYPE 2 DIABETES MELLITUS WITH DIABETIC POLYNEUROPATHY, WITHOUT LONG-TERM CURRENT USE OF INSULIN (HCC): Primary | ICD-10-CM

## 2024-09-05 DIAGNOSIS — Z89.419 HISTORY OF AMPUTATION OF GREAT TOE (HCC): ICD-10-CM

## 2024-09-05 PROCEDURE — 99213 OFFICE O/P EST LOW 20 MIN: CPT | Performed by: STUDENT IN AN ORGANIZED HEALTH CARE EDUCATION/TRAINING PROGRAM

## 2024-09-05 NOTE — PROGRESS NOTES
[facility]  Progress Note      Flora Moraes is a 63 year old female.   Chief Complaint   Patient presents with    Diabetic Foot Care     3 mo f/u - last YvP2E=3.3 from 10/23/23 when she was seen by endo Dr Ahuja - Denies any pain. Did not check bs this morning.              HPI:     Patient is a 63-year-old diabetic female presents to clinic for bilateral foot evaluation.  Denies any injuries and denies any acute signs of infection to bilateral feet      Allergies: Ceftriaxone    Current Outpatient Medications   Medication Sig Dispense Refill    GLIMEPIRIDE 4 MG Oral Tab Take 1 tablet (4 mg total) by mouth before breakfast. 90 tablet 1    dapagliflozin (FARXIGA) 10 MG Oral Tab Take 1 tablet (10 mg total) by mouth daily. 90 tablet 1    METFORMIN  MG Oral Tablet 24 Hr Take 2 tablets (1,000 mg total) by mouth 2 (two) times daily with meals. 360 tablet 1    lisinopril 5 MG Oral Tab Take 1 tablet (5 mg total) by mouth daily. 90 tablet 1    latanoprost 0.005 % Ophthalmic Solution INSTILL 1 DROP INTO BOTH EYES EVERY MORNING 3 each 3    OneTouch UltraSoft Lancets Does not apply Misc Test 1x daily (Patient not taking: Reported on 9/5/2024) 100 each 1    Glucose Blood (ONETOUCH ULTRA) In Vitro Strip Test 1x daily (Patient not taking: Reported on 9/5/2024) 100 each 1    ACCU-CHEK SOFTCLIX LANCETS Does not apply Misc  (Patient not taking: Reported on 9/5/2024)  0    Glucose Blood In Vitro Strip place 1 by Intradermal route  every day (Patient not taking: Reported on 9/5/2024)        Past Medical History:    Acute iritis    NG:  OD    After-cataract of left eye with vision obscured    Cataract    NG: OD    Cataract    NG:  OS    Episcleritis    NG:  OD    Ganglion    NG: Right thumb    High blood pressure    Iritis    NG: OD    Myopia with astigmatism and presbyopia    NG:  OU    Ocular hypertension    NG:  OS    Ophthalmological disorder    NG:  Macula pigmentation changes, OS, 2006    Type II or  unspecified type diabetes mellitus without mention of complication, not stated as uncontrolled    NG:  No retinopathy, OU, 2006    Type II or unspecified type diabetes mellitus without mention of complication, not stated as uncontrolled    NG:  No retinopathy, OU, 2009    Type II or unspecified type diabetes mellitus without mention of complication, not stated as uncontrolled    NG:  No retinopathy, OU, 2010    Type II or unspecified type diabetes mellitus without mention of complication, not stated as uncontrolled    NG:  No retinopathy, OU,     Type II or unspecified type diabetes mellitus without mention of complication, not stated as uncontrolled    NG: No retinopathy, OU,       Past Surgical History:   Procedure Laterality Date          Cataract extraction w/  intraocular lens implant Right 14     Phaco w/ PC IOL/general/vision blue/ RJM    Cataract extraction w/  intraocular lens implant Left 14    Phaco with PC IOL/ general/ Vision Blue/ RJM    Colonoscopy N/A 10/26/2022    Procedure: COLONOSCOPY;  Surgeon: Kathryn Ng MD;  Location: [facility]    Other surgical history      NG: Bunion/hammer toes surgery     Other surgical history      NG:  Excision ganglion, right thumb, 10/22/2013 - Lyle Seaman     Yag capsulotomy - od - right eye Right 2016    Dr. Andrade    Yag capsulotomy - os - left eye Left 16    RJM      Family History   Problem Relation Age of Onset    Cancer Father         NG: Esophageal    Heart Disease Mother         NG:  CAD    Cancer Mother     Diabetes Mother     Psychiatric Paternal Grandmother     Ovarian Cancer Paternal Grandmother     Breast Cancer Cousin     Ovarian Cancer Paternal Cousin Female     Breast Cancer Paternal Cousin Female         pre isidro    Glaucoma Neg     Macular degeneration Neg       Social History     Socioeconomic History    Marital status:    Tobacco Use    Smoking status: Never    Smokeless tobacco: Never   Vaping  Use    Vaping status: Never Used   Substance and Sexual Activity    Alcohol use: Yes     Comment:   Occasionally     Drug use: No   Other Topics Concern    Caffeine Concern Yes           REVIEW OF SYSTEMS:     Denies nause, fever, chills  No calf pain  Denies chest pain or SOB      EXAM:   LMP  (LMP Unknown)   GENERAL: well developed, well nourished, in no apparent distress  EXTREMITIES:   1. Integument: Normal skin temperature and turgor. Toenails x 9 are elongated, thickened and discolored with subungal derbi.     2. Vascular: Dorsalis pedis two out of four bilateral and posterior tibial pulses two out of   four bilateral, capillary refill normal.   3. Musculoskeletal: All muscle groups are graded 5 out of 5 in the foot and ankle.  Status post partial right hallux amputation   4. Neurological: Normal sharp dull sensation; reflexes normal.             ASSESSMENT AND PLAN:   There are no diagnoses linked to this encounter.      Plan:       -Patient examined, chart history reviewed.  -Discussed importance of proper pedal hygiene, regular foot checks, and tight glucose control.  -Sharply debrided nails x 9 with a sterile nail nipper achieving a 20% reduction in thickness and length, without incident.   -Ambulate with supportive shoes and inserts and avoid walking barefoot.  -Educated patient on acute signs of infection advised patient to seek immediate medical attention if symptoms arise.    RTC in 3 months      The patient indicates understanding of these issues and agrees to the plan.        Batsheva Dwyer DPM

## 2024-09-09 ENCOUNTER — HOSPITAL ENCOUNTER (OUTPATIENT)
Age: 64
Discharge: HOME | End: 2024-09-09
Payer: COMMERCIAL

## 2024-09-09 ENCOUNTER — APPOINTMENT (OUTPATIENT)
Dept: GENERAL RADIOLOGY | Age: 64
End: 2024-09-09
Attending: NURSE PRACTITIONER
Payer: COMMERCIAL

## 2024-09-09 VITALS
TEMPERATURE: 100 F | HEIGHT: 66 IN | RESPIRATION RATE: 18 BRPM | HEART RATE: 80 BPM | WEIGHT: 159 LBS | BODY MASS INDEX: 25.55 KG/M2 | DIASTOLIC BLOOD PRESSURE: 58 MMHG | OXYGEN SATURATION: 97 % | SYSTOLIC BLOOD PRESSURE: 143 MMHG

## 2024-09-09 DIAGNOSIS — J06.9 UPPER RESPIRATORY VIRUS: Primary | ICD-10-CM

## 2024-09-09 LAB
POCT INFLUENZA A: NEGATIVE
POCT INFLUENZA B: NEGATIVE
SARS-COV-2 RNA RESP QL NAA+PROBE: NOT DETECTED

## 2024-09-09 PROCEDURE — 71046 X-RAY EXAM CHEST 2 VIEWS: CPT | Performed by: NURSE PRACTITIONER

## 2024-09-09 PROCEDURE — 87502 INFLUENZA DNA AMP PROBE: CPT | Performed by: NURSE PRACTITIONER

## 2024-09-09 PROCEDURE — 99214 OFFICE O/P EST MOD 30 MIN: CPT | Performed by: NURSE PRACTITIONER

## 2024-09-09 PROCEDURE — U0002 COVID-19 LAB TEST NON-CDC: HCPCS | Performed by: NURSE PRACTITIONER

## 2024-09-09 RX ORDER — BENZONATATE 100 MG/1
200 CAPSULE ORAL 3 TIMES DAILY PRN
Qty: 30 CAPSULE | Refills: 0 | Status: SHIPPED | OUTPATIENT
Start: 2024-09-09 | End: 2024-10-09

## 2024-09-09 NOTE — ED PROVIDER NOTES
He    Patient Seen in: [facility]      History     Chief Complaint   Patient presents with    Cough/URI     Stated Complaint: COUGH  Subjective:   63-year-old female presents for URI symptoms that started yesterday.  She has a productive cough with green and yellow sputum, nasal congestion, and chills.  She has not taken her temperature.  She denies any sore throat or ear pain.  No chest pain or difficulty breathing.  No headaches or dizziness.  She is eating and drinking without vomiting or diarrhea, but has a decreased appetite.  She states she was around her grandchildren who had similar symptoms.  She did take an over-the-counter cough and flu medication today.  She appears nontoxic.        Objective:   Past Medical History:    Acute iritis    NG:  OD    After-cataract of left eye with vision obscured    Cataract    NG: OD    Cataract    NG:  OS    Episcleritis    NG:  OD    Ganglion    NG: Right thumb    High blood pressure    Iritis    NG: OD    Myopia with astigmatism and presbyopia    NG:  OU    Ocular hypertension    NG:  OS    Ophthalmological disorder    NG:  Macula pigmentation changes, OS, 2006    Type II or unspecified type diabetes mellitus without mention of complication, not stated as uncontrolled    NG:  No retinopathy, OU, 2006    Type II or unspecified type diabetes mellitus without mention of complication, not stated as uncontrolled    NG:  No retinopathy, OU, 2009    Type II or unspecified type diabetes mellitus without mention of complication, not stated as uncontrolled    NG:  No retinopathy, OU, 2010    Type II or unspecified type diabetes mellitus without mention of complication, not stated as uncontrolled    NG:  No retinopathy, OU,     Type II or unspecified type diabetes mellitus without mention of complication, not stated as uncontrolled    NG: No retinopathy, OU,             Past Surgical History:   Procedure Laterality Date          Cataract extraction w/   intraocular lens implant Right 4/21/14     Phaco w/ PC IOL/general/vision blue/ RJM    Cataract extraction w/  intraocular lens implant Left 6/23/14    Phaco with PC IOL/ general/ Vision Blue/ RJM    Colonoscopy N/A 10/26/2022    Procedure: COLONOSCOPY;  Surgeon: Kathryn Ng MD;  Location: [facility]    Other surgical history      NG: Bunion/hammer toes surgery     Other surgical history      NG:  Excision ganglion, right thumb, 10/22/2013 - Lyle Seaman     Yag capsulotomy - od - right eye Right 6/22/2016    Dr. Andrade    Yag capsulotomy - os - left eye Left 1/6/16    RJM              Social History     Socioeconomic History    Marital status:    Tobacco Use    Smoking status: Never    Smokeless tobacco: Never   Vaping Use    Vaping status: Never Used   Substance and Sexual Activity    Alcohol use: Yes     Comment:   Occasionally     Drug use: No   Other Topics Concern    Caffeine Concern Yes            Review of Systems    Positive for stated complaint: Cough/URI     Other systems are as noted in HPI.  Constitutional and vital signs reviewed.      All other systems reviewed and negative except as noted above.    Physical Exam     ED Triage Vitals [09/09/24 1655]   /58   Pulse 80   Resp 18   Temp 100.2 °F (37.9 °C)   Temp src Temporal   SpO2 97 %   O2 Device None (Room air)     Current:/58   Pulse 80   Temp 100.2 °F (37.9 °C) (Temporal)   Resp 18   Ht 167.6 cm (5' 6\")   Wt 72.1 kg   LMP  (LMP Unknown)   SpO2 97%   BMI 25.66 kg/m²     Physical Exam  Vitals and nursing note reviewed.   Constitutional:       General: She is not in acute distress.     Appearance: Normal appearance. She is not toxic-appearing.   HENT:      Head: Normocephalic.      Right Ear: Tympanic membrane normal.      Left Ear: Tympanic membrane normal.      Nose: Congestion present. No rhinorrhea.      Mouth/Throat:      Mouth: Mucous membranes are moist.      Pharynx: Oropharynx is clear. No oropharyngeal  exudate or posterior oropharyngeal erythema.   Eyes:      Extraocular Movements: Extraocular movements intact.      Conjunctiva/sclera: Conjunctivae normal.      Pupils: Pupils are equal, round, and reactive to light.   Cardiovascular:      Rate and Rhythm: Normal rate and regular rhythm.   Pulmonary:      Effort: Pulmonary effort is normal.      Breath sounds: Normal breath sounds. No wheezing, rhonchi or rales.   Musculoskeletal:         General: Normal range of motion.      Cervical back: Normal range of motion and neck supple.   Lymphadenopathy:      Cervical: No cervical adenopathy.   Skin:     General: Skin is warm and dry.      Capillary Refill: Capillary refill takes less than 2 seconds.      Findings: No rash.   Neurological:      General: No focal deficit present.      Mental Status: She is alert and oriented to person, place, and time.   Psychiatric:         Mood and Affect: Mood normal.         Behavior: Behavior normal.         ED Course   XR CHEST PA + LAT CHEST (CPT=71046)    Result Date: 9/9/2024  CONCLUSION: No acute cardiopulmonary abnormality.    Dictated by (CST): Robin Gunter MD on 9/09/2024 at 5:50 PM     Finalized by (CST): Robin Gunter MD on 9/09/2024 at 5:51 PM         Labs Reviewed   RAPID SARS-COV-2 BY PCR - Normal   POCT FLU TEST - Normal    Narrative:     This assay is a rapid molecular in vitro test utilizing nucleic acid amplification of influenza A and B viral RNA.       MDM     Medical Decision Making  The patient is aware of her testing results below.  Her symptoms are most likely viral.  I will prescribe Tessalon Perles to help with her cough.  We discussed pushing fluids, rest, and Tylenol as needed for pain or fever.  She will follow-up as needed with her primary care doctor if her symptoms persist.    Amount and/or Complexity of Data Reviewed  Labs: ordered.     Details: COVID and influenza tests are negative.  Radiology: ordered.     Details: I have visualized the chest x-ray  images which are negative for any pneumonia or other acute process.    Risk  OTC drugs.  Prescription drug management.  Risk Details: COVID versus influenza versus pneumonia versus URI        Disposition and Plan     Clinical Impression:  1. Upper respiratory virus         Disposition:  Discharge  9/9/2024  6:07 pm    Follow-up:  Conor Dobbs DO  [address] [facility] [address]  [phone]    Schedule an appointment as soon as possible for a visit in 3 days            Medications Prescribed:  Discharge Medication List as of 9/9/2024  6:11 PM        START taking these medications    Details   benzonatate 100 MG Oral Cap Take 2 capsules (200 mg total) by mouth 3 (three) times daily as needed for cough., Normal, Disp-30 capsule, R-0

## 2024-09-09 NOTE — DISCHARGE INSTRUCTIONS
Tylenol or Motrin as needed for pain or fever.  Take the cough suppressant as needed.  If your symptoms persist, follow-up closely with your primary care doctor.  Return for any concerns.

## 2024-09-20 ENCOUNTER — TELEPHONE (OUTPATIENT)
Dept: FAMILY MEDICINE CLINIC | Facility: CLINIC | Age: 64
End: 2024-09-20

## 2024-09-26 DIAGNOSIS — E11.8 CONTROLLED TYPE 2 DIABETES MELLITUS WITH COMPLICATION, WITHOUT LONG-TERM CURRENT USE OF INSULIN (HCC): ICD-10-CM

## 2024-09-26 RX ORDER — LISINOPRIL 5 MG/1
5 TABLET ORAL DAILY
Qty: 90 TABLET | Refills: 0 | Status: SHIPPED | OUTPATIENT
Start: 2024-09-26

## 2024-09-26 NOTE — TELEPHONE ENCOUNTER
Endocrine Refill protocol for oral antihypertensive medications    Protocol Criteria:  PASSED  Reason: N/A     If all below requirements are met, send a 90-day supply with 1 refill per provider protocol.    Verify appointment with Endocrinology completed in the last 6 months or scheduled in the next 3 months.  Verify BMP or CMP completed in the last 12 months   Verify last GFR result is greater than or equal to 50     Last completed office visit:10/23/2023 Sarah Ahuja MD   Next scheduled Follow up:   Future Appointments   Date Time Provider Department Center   10/10/2024  3:30 PM Sarah Ahuja MD [facility]      Last BMP or CMP completion date:  Lab Results   Component Value Date    GFRAA 97 12/03/2021    GFRNAA 84 12/03/2021    EGFRCR 90 03/31/2024

## 2024-10-10 ENCOUNTER — OFFICE VISIT (OUTPATIENT)
Dept: ENDOCRINOLOGY CLINIC | Facility: CLINIC | Age: 64
End: 2024-10-10

## 2024-10-10 ENCOUNTER — LAB ENCOUNTER (OUTPATIENT)
Dept: LAB | Facility: HOSPITAL | Age: 64
End: 2024-10-10
Attending: INTERNAL MEDICINE
Payer: COMMERCIAL

## 2024-10-10 VITALS
WEIGHT: 159 LBS | SYSTOLIC BLOOD PRESSURE: 137 MMHG | BODY MASS INDEX: 26 KG/M2 | DIASTOLIC BLOOD PRESSURE: 78 MMHG | HEART RATE: 51 BPM

## 2024-10-10 DIAGNOSIS — E11.8 CONTROLLED TYPE 2 DIABETES MELLITUS WITH COMPLICATION, WITHOUT LONG-TERM CURRENT USE OF INSULIN (HCC): Primary | ICD-10-CM

## 2024-10-10 DIAGNOSIS — E11.8 CONTROLLED TYPE 2 DIABETES MELLITUS WITH COMPLICATION, WITHOUT LONG-TERM CURRENT USE OF INSULIN (HCC): ICD-10-CM

## 2024-10-10 LAB
ALBUMIN SERPL-MCNC: 4.2 G/DL (ref 3.2–4.8)
ALBUMIN/GLOB SERPL: 1.8 {RATIO} (ref 1–2)
ALP LIVER SERPL-CCNC: 89 U/L
ALT SERPL-CCNC: 20 U/L
ANION GAP SERPL CALC-SCNC: 8 MMOL/L (ref 0–18)
AST SERPL-CCNC: 19 U/L (ref ?–34)
BASOPHILS # BLD AUTO: 0.05 X10(3) UL (ref 0–0.2)
BASOPHILS NFR BLD AUTO: 0.7 %
BILIRUB SERPL-MCNC: 0.2 MG/DL (ref 0.2–1.1)
BUN BLD-MCNC: 17 MG/DL (ref 9–23)
BUN/CREAT SERPL: 20 (ref 10–20)
CALCIUM BLD-MCNC: 9.4 MG/DL (ref 8.7–10.4)
CHLORIDE SERPL-SCNC: 107 MMOL/L (ref 98–112)
CHOLEST SERPL-MCNC: 131 MG/DL (ref ?–200)
CO2 SERPL-SCNC: 26 MMOL/L (ref 21–32)
CREAT BLD-MCNC: 0.85 MG/DL
CREAT UR-SCNC: 32.4 MG/DL
DEPRECATED RDW RBC AUTO: 40.5 FL (ref 35.1–46.3)
EGFRCR SERPLBLD CKD-EPI 2021: 77 ML/MIN/1.73M2 (ref 60–?)
EOSINOPHIL # BLD AUTO: 0.16 X10(3) UL (ref 0–0.7)
EOSINOPHIL NFR BLD AUTO: 2.3 %
ERYTHROCYTE [DISTWIDTH] IN BLOOD BY AUTOMATED COUNT: 12.3 % (ref 11–15)
FASTING PATIENT LIPID ANSWER: NO
FASTING STATUS PATIENT QL REPORTED: NO
GLOBULIN PLAS-MCNC: 2.3 G/DL (ref 2–3.5)
GLUCOSE BLD-MCNC: 154 MG/DL (ref 70–99)
GLUCOSE BLOOD: 64
GLUCOSE BLOOD: 97
HCT VFR BLD AUTO: 38.3 %
HDLC SERPL-MCNC: 45 MG/DL (ref 40–59)
HEMOGLOBIN A1C: 6.4 % (ref 4.3–5.6)
HGB BLD-MCNC: 13.3 G/DL
IMM GRANULOCYTES # BLD AUTO: 0.01 X10(3) UL (ref 0–1)
IMM GRANULOCYTES NFR BLD: 0.1 %
LDLC SERPL CALC-MCNC: 64 MG/DL (ref ?–100)
LYMPHOCYTES # BLD AUTO: 2.25 X10(3) UL (ref 1–4)
LYMPHOCYTES NFR BLD AUTO: 32.6 %
MCH RBC QN AUTO: 31.5 PG (ref 26–34)
MCHC RBC AUTO-ENTMCNC: 34.7 G/DL (ref 31–37)
MCV RBC AUTO: 90.8 FL
MICROALBUMIN UR-MCNC: <0.3 MG/DL
MONOCYTES # BLD AUTO: 0.5 X10(3) UL (ref 0.1–1)
MONOCYTES NFR BLD AUTO: 7.2 %
NEUTROPHILS # BLD AUTO: 3.94 X10 (3) UL (ref 1.5–7.7)
NEUTROPHILS # BLD AUTO: 3.94 X10(3) UL (ref 1.5–7.7)
NEUTROPHILS NFR BLD AUTO: 57.1 %
NONHDLC SERPL-MCNC: 86 MG/DL (ref ?–130)
OSMOLALITY SERPL CALC.SUM OF ELEC: 297 MOSM/KG (ref 275–295)
PLATELET # BLD AUTO: 232 10(3)UL (ref 150–450)
POTASSIUM SERPL-SCNC: 4.3 MMOL/L (ref 3.5–5.1)
PROT SERPL-MCNC: 6.5 G/DL (ref 5.7–8.2)
RBC # BLD AUTO: 4.22 X10(6)UL
SODIUM SERPL-SCNC: 141 MMOL/L (ref 136–145)
TEST STRIP LOT #: NORMAL NUMERIC
TEST STRIP LOT #: NORMAL NUMERIC
TRIGL SERPL-MCNC: 120 MG/DL (ref 30–149)
TSI SER-ACNC: 1.98 MIU/ML (ref 0.55–4.78)
VLDLC SERPL CALC-MCNC: 18 MG/DL (ref 0–30)
WBC # BLD AUTO: 6.9 X10(3) UL (ref 4–11)

## 2024-10-10 PROCEDURE — 80061 LIPID PANEL: CPT

## 2024-10-10 PROCEDURE — 80053 COMPREHEN METABOLIC PANEL: CPT

## 2024-10-10 PROCEDURE — 3044F HG A1C LEVEL LT 7.0%: CPT | Performed by: INTERNAL MEDICINE

## 2024-10-10 PROCEDURE — 3075F SYST BP GE 130 - 139MM HG: CPT | Performed by: INTERNAL MEDICINE

## 2024-10-10 PROCEDURE — 36415 COLL VENOUS BLD VENIPUNCTURE: CPT

## 2024-10-10 PROCEDURE — 82570 ASSAY OF URINE CREATININE: CPT

## 2024-10-10 PROCEDURE — 83036 HEMOGLOBIN GLYCOSYLATED A1C: CPT | Performed by: INTERNAL MEDICINE

## 2024-10-10 PROCEDURE — 82043 UR ALBUMIN QUANTITATIVE: CPT

## 2024-10-10 PROCEDURE — 85025 COMPLETE CBC W/AUTO DIFF WBC: CPT

## 2024-10-10 PROCEDURE — 99214 OFFICE O/P EST MOD 30 MIN: CPT | Performed by: INTERNAL MEDICINE

## 2024-10-10 PROCEDURE — 84443 ASSAY THYROID STIM HORMONE: CPT

## 2024-10-10 PROCEDURE — 82947 ASSAY GLUCOSE BLOOD QUANT: CPT | Performed by: INTERNAL MEDICINE

## 2024-10-10 PROCEDURE — 3078F DIAST BP <80 MM HG: CPT | Performed by: INTERNAL MEDICINE

## 2024-10-10 RX ORDER — GLIMEPIRIDE 2 MG/1
2 TABLET ORAL
Qty: 90 TABLET | Refills: 1 | Status: SHIPPED | OUTPATIENT
Start: 2024-10-10

## 2024-10-10 NOTE — PROGRESS NOTES
Name: Flora Moraes  Date: 10/10/2024    Referring Physician: No ref. provider found    HISTORY OF PRESENT ILLNESS   Flora Moraes is a 63 year old female who presents for diabetes mellitus, diagnosed over 8 years ago.     Prior HbA, C or glycohemoglobin were 10.3% 1/2020; 9.6% 8/2020; 7.2% 10/2020; 6.9% 5/2021; 6.9% 6/2022; 6.6% 3/2023; 6.3% 10/2023; 6.4% POC Today     Dietary compliance: Fair - she admits to significant cheating, eating comfort foods, she did take classes at initial diagnosis   Exercise: No   Polyuria/polydipsia: No  Blurred vision: No    Episodes of hypoglycemia: Yes, occ Hypoglycemia in afternoon, frequently skipping lunch   Blood Glucose:  Not checking     Medications for DM   Metformin ER 1000mg PO BID   Farxiga 10mg PO daily   Glimepiride 4mg PO daily     REVIEW OF SYSTEMS  Eyes: Diabetic retinopathy present: No            Most recent visit to eye doctor in last 12 months: Yes    CV: Cardiovascular disease present: No         Hypertension present: Yes         Hyperlipidemia present: Yes         Peripheral Vascular Disease present: No    : Nephropathy present: No    Neuro: Neuropathy present: Yes - last podiatry visit 6 months ago     Skin: Infection or ulceration: Yes - LE ulceration s/p amputation in the past     Osteoporosis: No    Thyroid disease: No      Medications:     Current Outpatient Medications:     LISINOPRIL 5 MG Oral Tab, Take 1 tablet (5 mg total) by mouth daily., Disp: 90 tablet, Rfl: 0    GLIMEPIRIDE 4 MG Oral Tab, Take 1 tablet (4 mg total) by mouth before breakfast., Disp: 90 tablet, Rfl: 1    dapagliflozin (FARXIGA) 10 MG Oral Tab, Take 1 tablet (10 mg total) by mouth daily., Disp: 90 tablet, Rfl: 1    METFORMIN  MG Oral Tablet 24 Hr, Take 2 tablets (1,000 mg total) by mouth 2 (two) times daily with meals., Disp: 360 tablet, Rfl: 1    latanoprost 0.005 % Ophthalmic Solution, INSTILL 1 DROP INTO BOTH EYES EVERY MORNING, Disp: 3 each, Rfl: 3     OneTouch UltraSoft Lancets Does not apply Misc, Test 1x daily, Disp: 100 each, Rfl: 1    Glucose Blood (ONETOUCH ULTRA) In Vitro Strip, Test 1x daily, Disp: 100 each, Rfl: 1    ACCU-CHEK SOFTCLIX LANCETS Does not apply Misc, , Disp: , Rfl: 0    Glucose Blood In Vitro Strip, place 1 by Intradermal route  every day, Disp: , Rfl:      Allergies:   Allergies   Allergen Reactions    Ceftriaxone RASH       Social History:   Social History     Socioeconomic History    Marital status:    Tobacco Use    Smoking status: Never    Smokeless tobacco: Never   Vaping Use    Vaping status: Never Used   Substance and Sexual Activity    Alcohol use: Yes     Comment:   Occasionally     Drug use: No   Other Topics Concern    Caffeine Concern Yes       Medical History:   Past Medical History:    Acute iritis    NG:  OD    After-cataract of left eye with vision obscured    Cataract    NG: OD    Cataract    NG:  OS    Episcleritis    NG:  OD    Ganglion    NG: Right thumb    High blood pressure    Iritis    NG: OD    Myopia with astigmatism and presbyopia    NG:  OU    Ocular hypertension    NG:  OS    Ophthalmological disorder    NG:  Macula pigmentation changes, OS, 2006    Type II or unspecified type diabetes mellitus without mention of complication, not stated as uncontrolled    NG:  No retinopathy, OU, 2006    Type II or unspecified type diabetes mellitus without mention of complication, not stated as uncontrolled    NG:  No retinopathy, OU, 2009    Type II or unspecified type diabetes mellitus without mention of complication, not stated as uncontrolled    NG:  No retinopathy, OU, 2010    Type II or unspecified type diabetes mellitus without mention of complication, not stated as uncontrolled    NG:  No retinopathy, OU, 2011    Type II or unspecified type diabetes mellitus without mention of complication, not stated as uncontrolled    NG: No retinopathy, OU, 2014       Surgical history:   Past Surgical History:   Procedure  Laterality Date          Cataract extraction w/  intraocular lens implant Right 14     Phaco w/ PC IOL/general/vision blue/ RJM    Cataract extraction w/  intraocular lens implant Left 14    Phaco with PC IOL/ general/ Vision Blue/ RJM    Colonoscopy N/A 10/26/2022    Procedure: COLONOSCOPY;  Surgeon: Kathryn Ng MD;  Location: [facility]    Other surgical history      NG: Bunion/hammer toes surgery     Other surgical history      NG:  Excision ganglion, right thumb, 10/22/2013 - Lyle Seaman     Yag capsulotomy - od - right eye Right 2016    Dr. Andrade    Yag capsulotomy - os - left eye Left 16    RJ         PHYSICAL EXAM  /78   Pulse 51   Wt 159 lb (72.1 kg)   LMP  (LMP Unknown)   BMI 25.66 kg/m²     General Appearance:  alert, well developed, in no acute distress  Eyes:  normal conjunctivae, sclera., normal sclera and normal pupils  Ears/Nose/Mouth/Throat/Neck:  no palpable thyroid nodules or cervical lymphadenopathy  Back: no kyphosis or back tenderness  Lymph Nodes:  No abnormal nodes noted  Musculoskeletal:  normal muscle strength and tone  Skin:  normal moisture and skin texture  Hair & Nails:  normal scalp hair     Hematologic:  no excessive bruising  Neuro:  sensory grossly intact and motor grossly intact  Psychiatric:  oriented to time, self, and place  Nutritional:  no abnormal weight gain or loss  Bilateral barefoot skin diabetic exam is abnormal with diabetic monofilament/sensation testing abnormal and dystrophic nails and/or dry skin      ASSESSMENT/PLAN:      1. Diabetes Mellitus Type 2, controlled  -controlled; HgA1c 6.4% -->stable   -Congratulated patient on improved blood glucose levels  -Discussed importance of glycemic control to prevent complications of diabetes  -Discussed complications of diabetes include retinopathy, neuropathy, nephropathy and cardiovascular disease  -Discussed importance of SBGM  -Discussed importance of low CHO diet,  recommend 45gm per meal or 135gm per day  -Continue Metformin ER 1000mg PO BID  -Decrease GLimepiride to 2mg PO daily  -Continue Farxiga 10mg PO daily   -She was not interested in GLPL-1 due to needle phobia   -Normotensive on ACE-I   -Normal lipids   -Normal kidney function  -Abnormal foot exam performed today     -Normal labs 3/2023 - recheck yearly labs     DM quality measures:  A1C/Blood pressure: as reported above   Nephropathy screening: 3/2023 continue ace /arb rx.   LIPID screening: 3/2023 . no statin rx.   Last dilated eye exam: Last Dilated Eye Exam: 09/12/24   Exam shows retinopathy? Eye Exam shows Diabetic Retinopathy?: No    Last diabetic foot exam: No data recorded    Dentist : recommend every 6m      RTC 6 months     10/10/2024  Sarah Ahuja MD

## 2024-10-11 ENCOUNTER — TELEPHONE (OUTPATIENT)
Dept: ENDOCRINOLOGY CLINIC | Facility: CLINIC | Age: 64
End: 2024-10-11

## 2024-10-11 NOTE — TELEPHONE ENCOUNTER
----- Message from Sarah Ahuja sent at 10/10/2024  6:52 PM CDT -----  Please call patient - Good news, your lab results are stable and at goal.  Your labs demonstrate normal kidney and liver function.  Normal urine testing. Normal thyroid function.  Your cholesterol levels are at goal.  Please continue current medications and contact the clinic with any questions or concerns.  Dr. Ahuja

## 2024-10-11 NOTE — TELEPHONE ENCOUNTER
Patient contacted (Name and  of pt verified). All results and recommendations reviewed. Patient verbalizes understanding, denies further questions and agrees with plan of care.    Patient states that she had a low blood sugar at her office visit yesterday and that her dose of glimepiride was subsequently reduced. Reviewed rule of 15's with the patient for home treatment of low sugars. Advised to call office with any sugar less than 70 despite recent dose adjustment so that we can provide further assistance if needed. Hypoglycemia education sheet mailed to the patient's home address.

## 2024-12-28 DIAGNOSIS — E11.8 CONTROLLED TYPE 2 DIABETES MELLITUS WITH COMPLICATION, WITHOUT LONG-TERM CURRENT USE OF INSULIN (HCC): ICD-10-CM

## 2024-12-30 RX ORDER — LISINOPRIL 5 MG/1
5 TABLET ORAL DAILY
Qty: 90 TABLET | Refills: 1 | Status: SHIPPED | OUTPATIENT
Start: 2024-12-30

## 2024-12-30 NOTE — TELEPHONE ENCOUNTER
Endocrine Refill protocol for oral antihypertensive medications    Protocol Criteria:  PASSED  Reason: N/A     If all below requirements are met, send a 90-day supply with 1 refill per provider protocol.    Verify appointment with Endocrinology completed in the last 6 months or scheduled in the next 3 months.  Verify BMP or CMP completed in the last 12 months   Verify last GFR result is greater than or equal to 50     Last completed office visit:10/10/2024 Sarah Ahuja MD   Next scheduled Follow up:   Future Appointments   Date Time Provider Department Center   1/13/2025  2:40 PM Batsheva Dwyer DPM [facility]      Last BMP or CMP completion date:  Lab Results   Component Value Date    GFRAA 97 12/03/2021    GFRNAA 84 12/03/2021    EGFRCR 77 10/10/2024

## 2025-01-06 RX ORDER — METFORMIN HYDROCHLORIDE 500 MG/1
1000 TABLET, EXTENDED RELEASE ORAL 2 TIMES DAILY WITH MEALS
Qty: 360 TABLET | Refills: 0 | Status: SHIPPED | OUTPATIENT
Start: 2025-01-06

## 2025-01-06 NOTE — TELEPHONE ENCOUNTER
Endocrine Refill protocol for metformin    Protocol Criteria:  PASSED  Reason: N/A    If all below requirements are met, send a 90-day supply with 1 refill per provider protocol.     Verify appointment with Endocrinology completed in the last 6 months or scheduled in the next 3 months.  Verify A1C has been completed within the last 6 months and is below 8.5%  Verify last GFR is greater than or equal to 40 in the past 12 months    Last completed office visit:10/10/2024 Sarah Ahuja MD   Next scheduled Follow up: no future appt      Last GFR result:    Lab Results   Component Value Date    EGFRCR 77 10/10/2024     Last A1c result: Last A1C result: 6.4% done 10/10/2024.

## 2025-01-13 ENCOUNTER — OFFICE VISIT (OUTPATIENT)
Dept: PODIATRY CLINIC | Facility: CLINIC | Age: 65
End: 2025-01-13
Payer: COMMERCIAL

## 2025-01-13 DIAGNOSIS — L84 CALLUS OF FOOT: ICD-10-CM

## 2025-01-13 DIAGNOSIS — Z89.419 HISTORY OF AMPUTATION OF GREAT TOE (HCC): ICD-10-CM

## 2025-01-13 DIAGNOSIS — E11.42 TYPE 2 DIABETES MELLITUS WITH DIABETIC POLYNEUROPATHY, WITHOUT LONG-TERM CURRENT USE OF INSULIN (HCC): Primary | ICD-10-CM

## 2025-01-13 DIAGNOSIS — L60.3 NAIL DYSTROPHY: ICD-10-CM

## 2025-01-13 PROCEDURE — 99213 OFFICE O/P EST LOW 20 MIN: CPT | Performed by: STUDENT IN AN ORGANIZED HEALTH CARE EDUCATION/TRAINING PROGRAM

## 2025-01-13 NOTE — PROGRESS NOTES
[facility]  Progress Note      Flora Moraes is a 64 year old female.   Chief Complaint   Patient presents with    Diabetic Foot Care     Nail trim and foot check f/u- last A1c=6.4 on 10/10/2024 and also was  LOV w/ Samanta Ahuja - FBS this am was not taken- also stated noticed cracked and dry skin to her L heel a month ago             HPI:     Patient is a 64-year-old diabetic female presents to clinic for bilateral foot evaluation.  Denies any injuries and denies any acute signs of infection to bilateral feet.       Allergies: Ceftriaxone    Current Outpatient Medications   Medication Sig Dispense Refill    METFORMIN  MG Oral Tablet 24 Hr Take 2 tablets (1,000 mg total) by mouth 2 (two) times daily with meals. 360 tablet 0    lisinopril 5 MG Oral Tab TAKE ONE TABLET BY MOUTH ONE TIME DAILY 90 tablet 1    glimepiride 2 MG Oral Tab Take 1 tablet (2 mg total) by mouth daily with breakfast. 90 tablet 1    dapagliflozin (FARXIGA) 10 MG Oral Tab Take 1 tablet (10 mg total) by mouth daily. 90 tablet 1    latanoprost 0.005 % Ophthalmic Solution INSTILL 1 DROP INTO BOTH EYES EVERY MORNING 3 each 3    OneTouch UltraSoft Lancets Does not apply Misc Test 1x daily 100 each 1    Glucose Blood (ONETOUCH ULTRA) In Vitro Strip Test 1x daily 100 each 1    ACCU-CHEK SOFTCLIX LANCETS Does not apply Misc   0    Glucose Blood In Vitro Strip place 1 by Intradermal route  every day        Past Medical History:    Acute iritis    NG:  OD    After-cataract of left eye with vision obscured    Cataract    NG: OD    Cataract    NG:  OS    Episcleritis    NG:  OD    Ganglion    NG: Right thumb    High blood pressure    Iritis    NG: OD    Myopia with astigmatism and presbyopia    NG:  OU    Ocular hypertension    NG:  OS    Ophthalmological disorder    NG:  Macula pigmentation changes, OS, 2006    Type II or unspecified type diabetes mellitus without mention of complication, not stated as uncontrolled    NG:  No  retinopathy, OU, 2006    Type II or unspecified type diabetes mellitus without mention of complication, not stated as uncontrolled    NG:  No retinopathy, OU, 2009    Type II or unspecified type diabetes mellitus without mention of complication, not stated as uncontrolled    NG:  No retinopathy, OU, 2010    Type II or unspecified type diabetes mellitus without mention of complication, not stated as uncontrolled    NG:  No retinopathy, OU,     Type II or unspecified type diabetes mellitus without mention of complication, not stated as uncontrolled    NG: No retinopathy, OU,       Past Surgical History:   Procedure Laterality Date          Cataract extraction w/  intraocular lens implant Right 14     Phaco w/ PC IOL/general/vision blue/ RJM    Cataract extraction w/  intraocular lens implant Left 14    Phaco with PC IOL/ general/ Vision Blue/ RJM    Colonoscopy N/A 10/26/2022    Procedure: COLONOSCOPY;  Surgeon: Kathryn Ng MD;  Location: [facility]    Other surgical history      NG: Bunion/hammer toes surgery     Other surgical history      NG:  Excision ganglion, right thumb, 10/22/2013 - Lyle Seaman     Yag capsulotomy - od - right eye Right 2016    Dr. Andrade    Yag capsulotomy - os - left eye Left 16    RJM      Family History   Problem Relation Age of Onset    Cancer Father         NG: Esophageal    Heart Disease Mother         NG:  CAD    Cancer Mother     Diabetes Mother     Psychiatric Paternal Grandmother     Ovarian Cancer Paternal Grandmother     Breast Cancer Cousin     Ovarian Cancer Paternal Cousin Female     Breast Cancer Paternal Cousin Female         pre isidro    Glaucoma Neg     Macular degeneration Neg       Social History     Socioeconomic History    Marital status:    Tobacco Use    Smoking status: Never    Smokeless tobacco: Never   Vaping Use    Vaping status: Never Used   Substance and Sexual Activity    Alcohol use: Yes     Comment:    Occasionally     Drug use: No   Other Topics Concern    Caffeine Concern Yes           REVIEW OF SYSTEMS:     Denies nause, fever, chills  No calf pain  Denies chest pain or SOB      EXAM:   LMP  (LMP Unknown)   GENERAL: well developed, well nourished, in no apparent distress  EXTREMITIES:   1. Integument: Normal skin temperature and turgor. Toenails x 9 are elongated, thickened and discolored with subungal derbi.     2. Vascular: Dorsalis pedis two out of four bilateral and posterior tibial pulses two out of   four bilateral, capillary refill normal.   3. Musculoskeletal: All muscle groups are graded 5 out of 5 in the foot and ankle.  Status post partial right hallux amputation   4. Neurological: Normal sharp dull sensation; reflexes normal.             ASSESSMENT AND PLAN:   There are no diagnoses linked to this encounter.      Plan:       -Patient examined, chart history reviewed.  -Discussed importance of proper pedal hygiene, regular foot checks, and tight glucose control.  -Sharply debrided nails x 9 with a sterile nail nipper achieving a 20% reduction in thickness and length, without incident.   -pared HPK to delio feet with sterile #15 blade to healthy skin with no incident.   -advised pt to moisturize delio feet  -Ambulate with supportive shoes and inserts and avoid walking barefoot.  -Educated patient on acute signs of infection advised patient to seek immediate medical attention if symptoms arise.    RTC in 3 months      The patient indicates understanding of these issues and agrees to the plan.        Batsheva Dwyer DPM

## 2025-02-10 ENCOUNTER — TELEPHONE (OUTPATIENT)
Dept: FAMILY MEDICINE CLINIC | Facility: CLINIC | Age: 65
End: 2025-02-10

## 2025-02-10 DIAGNOSIS — Z12.31 SCREENING MAMMOGRAM, ENCOUNTER FOR: Primary | ICD-10-CM

## 2025-02-11 DIAGNOSIS — E11.8 CONTROLLED TYPE 2 DIABETES MELLITUS WITH COMPLICATION, WITHOUT LONG-TERM CURRENT USE OF INSULIN (HCC): ICD-10-CM

## 2025-02-11 NOTE — TELEPHONE ENCOUNTER
Patient is requesting a written prescription for Farxiga.  She is requesting to  the prescription so she can send to a [place] pharmacy.  Please call       Current Outpatient Medications:       dapagliflozin (FARXIGA) 10 MG Oral Tab, Take 1 tablet (10 mg total) by mouth daily., Disp: 90 tablet, Rfl: 1

## 2025-02-11 NOTE — TELEPHONE ENCOUNTER
Endocrine Refill protocol for oral and injectable diabetic medications    Protocol Criteria:  PASSED  Reason: N/A    If all below requirements are met, send a 90-day supply with 1 refill per provider protocol.    Verify appointment with Endocrinology completed in the last 6 months or scheduled in the next 3 months.  Verify A1C has been completed within the last 6 months and is below 8.5%     Last completed office visit: 10/10/2024 Sarah Ahuja MD   Next scheduled Follow up:   Future Appointments   Date Time Provider Department Center   4/14/2025  2:30 PM Batsheva Dwyer DPM [facility]      Last A1c result: Last A1c value was 6.4% done 10/10/2024.    90 day + 1 refill pending

## 2025-02-12 RX ORDER — DAPAGLIFLOZIN 10 MG/1
10 TABLET, FILM COATED ORAL DAILY
Qty: 90 TABLET | Refills: 1 | Status: SHIPPED | OUTPATIENT
Start: 2025-02-12

## 2025-02-24 ENCOUNTER — OFFICE VISIT (OUTPATIENT)
Dept: FAMILY MEDICINE CLINIC | Facility: CLINIC | Age: 65
End: 2025-02-24

## 2025-02-24 ENCOUNTER — NURSE TRIAGE (OUTPATIENT)
Dept: FAMILY MEDICINE CLINIC | Facility: CLINIC | Age: 65
End: 2025-02-24

## 2025-02-24 VITALS
HEIGHT: 66 IN | DIASTOLIC BLOOD PRESSURE: 60 MMHG | SYSTOLIC BLOOD PRESSURE: 112 MMHG | BODY MASS INDEX: 23.63 KG/M2 | HEART RATE: 66 BPM | WEIGHT: 147 LBS | OXYGEN SATURATION: 99 %

## 2025-02-24 DIAGNOSIS — R22.0 JAW SWELLING: Primary | ICD-10-CM

## 2025-02-24 PROCEDURE — 99213 OFFICE O/P EST LOW 20 MIN: CPT

## 2025-02-24 PROCEDURE — 3078F DIAST BP <80 MM HG: CPT

## 2025-02-24 PROCEDURE — 3008F BODY MASS INDEX DOCD: CPT

## 2025-02-24 PROCEDURE — 3074F SYST BP LT 130 MM HG: CPT

## 2025-02-24 RX ORDER — AZITHROMYCIN 250 MG/1
TABLET, FILM COATED ORAL
Qty: 6 TABLET | Refills: 0 | Status: SHIPPED | OUTPATIENT
Start: 2025-02-24 | End: 2025-03-01

## 2025-02-24 NOTE — PROGRESS NOTES
Subjective:   Flora Moraes is a 64 year old female who presents for Swelling (Pt states she has swelling on left side on face by her ear she states she feels pressure but no pain she states its going down. )    Patient states she is having swelling of her left jaw since 1 day. The swelling is between her ear and jaw. Pressure with chewing and opening her mouth. Patient denies chest pain, congestion, cough, fatigue, headaches, joint swelling, rash, nausea or vomiting. Patient has apt with dentist in 3 days.     History/Other:      Chief Complaint Reviewed and Verified  Nursing Notes Reviewed and   Verified  Tobacco Reviewed  Allergies Reviewed  Medications Reviewed    Problem List Reviewed  Medical History Reviewed  Surgical History   Reviewed  OB Status Reviewed  Family History Reviewed  Social History   Reviewed           Tobacco:  She has never smoked tobacco.      Current Outpatient Medications   Medication Sig Dispense Refill    azithromycin (ZITHROMAX Z-AMA) 250 MG Oral Tab Take 2 tablets (500 mg total) by mouth daily for 1 day, THEN 1 tablet (250 mg total) daily for 4 days. 6 tablet 0    dapagliflozin (FARXIGA) 10 MG Oral Tab Take 1 tablet (10 mg total) by mouth daily. 90 tablet 1    METFORMIN  MG Oral Tablet 24 Hr Take 2 tablets (1,000 mg total) by mouth 2 (two) times daily with meals. 360 tablet 0    lisinopril 5 MG Oral Tab TAKE ONE TABLET BY MOUTH ONE TIME DAILY 90 tablet 1    glimepiride 2 MG Oral Tab Take 1 tablet (2 mg total) by mouth daily with breakfast. 90 tablet 1    latanoprost 0.005 % Ophthalmic Solution INSTILL 1 DROP INTO BOTH EYES EVERY MORNING 3 each 3    OneTouch UltraSoft Lancets Does not apply Misc Test 1x daily (Patient not taking: Reported on 2/24/2025) 100 each 1    Glucose Blood (ONETOUCH ULTRA) In Vitro Strip Test 1x daily (Patient not taking: Reported on 2/24/2025) 100 each 1    ACCU-CHEK SOFTCLIX LANCETS Does not apply Misc  (Patient not taking: Reported  on 2/24/2025)  0    Glucose Blood In Vitro Strip place 1 by Intradermal route  every day (Patient not taking: Reported on 2/24/2025)         Allergies[1]       Review of Systems   Constitutional: Negative.  Negative for activity change and fatigue.   HENT: Negative.  Negative for congestion, ear pain, rhinorrhea and sneezing.         Jaw swelling   Eyes: Negative.  Negative for redness.   Respiratory: Negative.  Negative for cough, shortness of breath and wheezing.    Cardiovascular: Negative.  Negative for chest pain.   Gastrointestinal: Negative.  Negative for abdominal pain, constipation, diarrhea, nausea and vomiting.   Endocrine: Negative.    Genitourinary: Negative.  Negative for difficulty urinating and frequency.   Musculoskeletal: Negative.  Negative for back pain, joint swelling and myalgias.   Skin: Negative.  Negative for rash.   Allergic/Immunologic: Negative.    Neurological: Negative.  Negative for dizziness, syncope, light-headedness and headaches.   Hematological: Negative.    Psychiatric/Behavioral: Negative.           Objective:   /60 (BP Location: Left arm, Patient Position: Sitting, Cuff Size: adult)   Pulse 66   Ht 5' 6\" (1.676 m)   Wt 147 lb (66.7 kg)   LMP  (LMP Unknown)   SpO2 99%   BMI 23.73 kg/m²  Estimated body mass index is 23.73 kg/m² as calculated from the following:    Height as of this encounter: 5' 6\" (1.676 m).    Weight as of this encounter: 147 lb (66.7 kg).      Physical Exam  Vitals and nursing note reviewed.   Constitutional:       Appearance: Normal appearance. She is normal weight.   HENT:      Head: Normocephalic and atraumatic.      Right Ear: Tympanic membrane normal.      Left Ear: Tympanic membrane normal.      Nose: Nose normal.      Mouth/Throat:      Mouth: Mucous membranes are moist.      Pharynx: Oropharynx is clear.   Eyes:      Extraocular Movements: Extraocular movements intact.      Conjunctiva/sclera: Conjunctivae normal.      Pupils: Pupils are  equal, round, and reactive to light.   Cardiovascular:      Rate and Rhythm: Normal rate and regular rhythm.      Pulses: Normal pulses.      Heart sounds: Normal heart sounds.   Pulmonary:      Effort: Pulmonary effort is normal.      Breath sounds: Normal breath sounds.   Abdominal:      General: Abdomen is flat. Bowel sounds are normal.      Palpations: Abdomen is soft.   Musculoskeletal:         General: No swelling. Normal range of motion.      Cervical back: Normal range of motion and neck supple.   Skin:     General: Skin is warm and dry.   Neurological:      General: No focal deficit present.      Mental Status: She is alert and oriented to person, place, and time. Mental status is at baseline.   Psychiatric:         Mood and Affect: Mood normal.         Behavior: Behavior normal.         Thought Content: Thought content normal.         Judgment: Judgment normal.         Assessment & Plan:     Assessment & Plan  Jaw swelling    Orders:    azithromycin (ZITHROMAX Z-AMA) 250 MG Oral Tab; Take 2 tablets (500 mg total) by mouth daily for 1 day, THEN 1 tablet (250 mg total) daily for 4 days.     -Will start on antibiotics and patient to follow up with dentist     Medication use, effects and side effects discussed in detail with patient. The patient indicated understanding of the diagnosis and agreed with the plan of care.    Return in about 1 week (around 3/3/2025).    CATRACHITA Rodgers       [1]   Allergies  Allergen Reactions    Ceftriaxone RASH

## 2025-02-24 NOTE — TELEPHONE ENCOUNTER
Action Requested: Summary for Provider     []  Critical Lab, Recommendations Needed  [] Need Additional Advice  []   FYI    []   Need Orders  [] Need Medications Sent to Pharmacy  []  Other     SUMMARY:appt made -noticed yesterday , swelling area between left ear and jaw , feels pressure when she opens her mouth/clickling sound, no pain    Reason for call: No chief complaint on file.  Onset: 1 day                    Reason for Disposition   Patient wants to be seen    Protocols used: No Protocol [id number]

## 2025-03-20 ENCOUNTER — HOSPITAL ENCOUNTER (OUTPATIENT)
Dept: MAMMOGRAPHY | Age: 65
Discharge: HOME | End: 2025-03-20
Attending: FAMILY MEDICINE
Payer: COMMERCIAL

## 2025-03-20 DIAGNOSIS — Z12.31 SCREENING MAMMOGRAM, ENCOUNTER FOR: ICD-10-CM

## 2025-03-20 PROCEDURE — 77067 SCR MAMMO BI INCL CAD: CPT | Performed by: FAMILY MEDICINE

## 2025-03-20 PROCEDURE — 77063 BREAST TOMOSYNTHESIS BI: CPT | Performed by: FAMILY MEDICINE

## 2025-03-26 ENCOUNTER — OFFICE VISIT (OUTPATIENT)
Dept: ENDOCRINOLOGY CLINIC | Facility: CLINIC | Age: 65
End: 2025-03-26

## 2025-03-26 VITALS
SYSTOLIC BLOOD PRESSURE: 134 MMHG | DIASTOLIC BLOOD PRESSURE: 69 MMHG | HEART RATE: 60 BPM | BODY MASS INDEX: 24 KG/M2 | WEIGHT: 151 LBS

## 2025-03-26 DIAGNOSIS — G89.29 CHRONIC PAIN OF RIGHT KNEE: ICD-10-CM

## 2025-03-26 DIAGNOSIS — M25.561 CHRONIC PAIN OF RIGHT KNEE: ICD-10-CM

## 2025-03-26 DIAGNOSIS — E11.8 CONTROLLED TYPE 2 DIABETES MELLITUS WITH COMPLICATION, WITHOUT LONG-TERM CURRENT USE OF INSULIN (HCC): Primary | ICD-10-CM

## 2025-03-26 LAB
GLUCOSE BLOOD: 115
HEMOGLOBIN A1C: 6.3 % (ref 4.3–5.6)
TEST STRIP LOT #: NORMAL NUMERIC

## 2025-03-26 PROCEDURE — 83036 HEMOGLOBIN GLYCOSYLATED A1C: CPT | Performed by: INTERNAL MEDICINE

## 2025-03-26 PROCEDURE — 3075F SYST BP GE 130 - 139MM HG: CPT | Performed by: INTERNAL MEDICINE

## 2025-03-26 PROCEDURE — 99213 OFFICE O/P EST LOW 20 MIN: CPT | Performed by: INTERNAL MEDICINE

## 2025-03-26 PROCEDURE — 82947 ASSAY GLUCOSE BLOOD QUANT: CPT | Performed by: INTERNAL MEDICINE

## 2025-03-26 PROCEDURE — 3078F DIAST BP <80 MM HG: CPT | Performed by: INTERNAL MEDICINE

## 2025-03-26 PROCEDURE — 3044F HG A1C LEVEL LT 7.0%: CPT | Performed by: INTERNAL MEDICINE

## 2025-03-26 NOTE — PROGRESS NOTES
Name: Flora Moraes  Date: 3/26/2025    Referring Physician: No ref. provider found    HISTORY OF PRESENT ILLNESS   Flora Moraes is a 64 year old female who presents for diabetes mellitus, diagnosed over 8 years ago.     She has been slowly losing weight over past few years.     Prior HbA, C or glycohemoglobin were 10.3% 1/2020; 9.6% 8/2020; 7.2% 10/2020; 6.9% 5/2021; 6.9% 6/2022; 6.6% 3/2023; 6.3% 10/2023; 6.4% 10/2024; 6.3% POC Today     Dietary compliance: Fair - she admits to significant cheating, eating comfort foods, she did take classes at initial diagnosis   Exercise: No   Polyuria/polydipsia: No  Blurred vision: No    Episodes of hypoglycemia: No, improved with decreased glimepiride    Blood Glucose:  Not checking     Medications for DM   Metformin ER 1000mg PO BID   Farxiga 10mg PO daily -->she is now receiving from [place]   Glimepiride 2mg PO daily     REVIEW OF SYSTEMS  Eyes: Diabetic retinopathy present: No            Most recent visit to eye doctor in last 12 months: Yes    CV: Cardiovascular disease present: No         Hypertension present: Yes         Hyperlipidemia present: Yes         Peripheral Vascular Disease present: No    : Nephropathy present: No    Neuro: Neuropathy present: Yes - last podiatry visit 6 months ago     Skin: Infection or ulceration: Yes - LE ulceration s/p amputation in the past     Osteoporosis: No    Thyroid disease: No      Medications:     Current Outpatient Medications:     dapagliflozin (FARXIGA) 10 MG Oral Tab, Take 1 tablet (10 mg total) by mouth daily., Disp: 90 tablet, Rfl: 1    METFORMIN  MG Oral Tablet 24 Hr, Take 2 tablets (1,000 mg total) by mouth 2 (two) times daily with meals., Disp: 360 tablet, Rfl: 0    lisinopril 5 MG Oral Tab, TAKE ONE TABLET BY MOUTH ONE TIME DAILY, Disp: 90 tablet, Rfl: 1    glimepiride 2 MG Oral Tab, Take 1 tablet (2 mg total) by mouth daily with breakfast., Disp: 90 tablet, Rfl: 1    latanoprost 0.005 %  Ophthalmic Solution, INSTILL 1 DROP INTO BOTH EYES EVERY MORNING, Disp: 3 each, Rfl: 3    OneTouch UltraSoft Lancets Does not apply Misc, Test 1x daily, Disp: 100 each, Rfl: 1    Glucose Blood (ONETOUCH ULTRA) In Vitro Strip, Test 1x daily, Disp: 100 each, Rfl: 1    ACCU-CHEK SOFTCLIX LANCETS Does not apply Misc, , Disp: , Rfl: 0    Glucose Blood In Vitro Strip, place 1 by Intradermal route  every day, Disp: , Rfl:      Allergies:   Allergies   Allergen Reactions    Ceftriaxone RASH       Social History:   Social History     Socioeconomic History    Marital status:    Tobacco Use    Smoking status: Never    Smokeless tobacco: Never   Vaping Use    Vaping status: Never Used   Substance and Sexual Activity    Alcohol use: Yes     Comment:   Occasionally     Drug use: No   Other Topics Concern    Caffeine Concern Yes       Medical History:   Past Medical History:    Acute iritis    NG:  OD    After-cataract of left eye with vision obscured    Cataract    NG: OD    Cataract    NG:  OS    Episcleritis    NG:  OD    Ganglion    NG: Right thumb    High blood pressure    Iritis    NG: OD    Myopia with astigmatism and presbyopia    NG:  OU    Ocular hypertension    NG:  OS    Ophthalmological disorder    NG:  Macula pigmentation changes, OS, 2006    Type II or unspecified type diabetes mellitus without mention of complication, not stated as uncontrolled    NG:  No retinopathy, OU, 2006    Type II or unspecified type diabetes mellitus without mention of complication, not stated as uncontrolled    NG:  No retinopathy, OU, 2009    Type II or unspecified type diabetes mellitus without mention of complication, not stated as uncontrolled    NG:  No retinopathy, OU, 2010    Type II or unspecified type diabetes mellitus without mention of complication, not stated as uncontrolled    NG:  No retinopathy, OU, 2011    Type II or unspecified type diabetes mellitus without mention of complication, not stated as uncontrolled    NG:  No retinopathy, OU,        Surgical history:   Past Surgical History:   Procedure Laterality Date          Cataract extraction w/  intraocular lens implant Right 14     Phaco w/ PC IOL/general/vision blue/ R[facility]    Cataract extraction w/  intraocular lens implant Left 14    Phaco with PC IOL/ general/ Vision Blue/ R[facility]    Colonoscopy N/A 10/26/2022    Procedure: COLONOSCOPY;  Surgeon: Kathryn Ng MD;  Location: [facility]    Other surgical history      NG: Bunion/hammer toes surgery     Other surgical history      NG:  Excision ganglion, right thumb, 10/22/2013 - Lyle Seaman     Yag capsulotomy - od - right eye Right 2016    Dr. Andrade    Yag capsulotomy - os - left eye Left 16    [facility]         PHYSICAL EXAM  /69   Pulse 60   Wt 151 lb (68.5 kg)   LMP  (LMP Unknown)   BMI 24.37 kg/m²     General Appearance:  alert, well developed, in no acute distress  Eyes:  normal conjunctivae, sclera., normal sclera and normal pupils  Ears/Nose/Mouth/Throat/Neck:  no palpable thyroid nodules or cervical lymphadenopathy  Back: no kyphosis or back tenderness  Lymph Nodes:  No abnormal nodes noted  Musculoskeletal:  normal muscle strength and tone  Skin:  normal moisture and skin texture  Hair & Nails:  normal scalp hair     Hematologic:  no excessive bruising  Neuro:  sensory grossly intact and motor grossly intact  Psychiatric:  oriented to time, self, and place  Nutritional:  no abnormal weight gain or loss      ASSESSMENT/PLAN:      1. Diabetes Mellitus Type 2, controlled  -controlled; HgA1c 6.3% -->stable   -Congratulated patient on improved blood glucose levels  -Discussed importance of glycemic control to prevent complications of diabetes  -Discussed complications of diabetes include retinopathy, neuropathy, nephropathy and cardiovascular disease  -Discussed importance of SBGM  -Discussed importance of low CHO diet, recommend 45gm per meal or 135gm per day  -Continue Metformin ER  1000mg PO BID  -Continue Glimepiride 2mg PO daily  -Continue Farxiga 10mg PO daily   -She was not interested in GLPL-1 due to needle phobia   -Normotensive on ACE-I   -Normal lipids   -Normal kidney function  -Abnormal foot exam performed 9/2024   -Normal labs 10/2024     2. Chronic R knee Pain  - Recheck Xray     DM quality measures:  A1C/Blood pressure: as reported above   Nephropathy screening: 10/2024 continue ace /arb rx.   LIPID screening: 10/2024 . no statin rx.   Last dilated eye exam: Last Dilated Eye Exam: 09/12/24   Exam shows retinopathy? Eye Exam shows Diabetic Retinopathy?: No    Last diabetic foot exam: No data recorded    Dentist : recommend every 6m        RTC 6 months     3/26/2025  Sarah Ahuja MD

## 2025-04-03 RX ORDER — GLIMEPIRIDE 2 MG/1
2 TABLET ORAL
Qty: 90 TABLET | Refills: 0 | Status: SHIPPED | OUTPATIENT
Start: 2025-04-03

## 2025-04-03 RX ORDER — METFORMIN HYDROCHLORIDE 500 MG/1
1000 TABLET, EXTENDED RELEASE ORAL 2 TIMES DAILY WITH MEALS
Qty: 360 TABLET | Refills: 0 | Status: SHIPPED | OUTPATIENT
Start: 2025-04-03

## 2025-04-03 NOTE — TELEPHONE ENCOUNTER
Endocrine Refill protocol for oral and injectable diabetic medications    Protocol Criteria:  PASSED  Reason: N/A    If all below requirements are met, send a 90-day supply with 1 refill per provider protocol.    Verify appointment with Endocrinology completed in the last 6 months or scheduled in the next 3 months.  Verify A1C has been completed within the last 6 months and is below 8.5%     Last completed office visit: 3/26/2025 Sarah Ahuja MD   Next scheduled Follow up:   Future Appointments   Date Time Provider Department Center   4/14/2025  2:30 PM Batsheva Dwyer DPM [facility]      Last A1c result: Last A1c value was 6.3% done 3/26/2025.

## 2025-04-11 ENCOUNTER — HOSPITAL ENCOUNTER (OUTPATIENT)
Dept: GENERAL RADIOLOGY | Age: 65
Discharge: HOME | End: 2025-04-11
Attending: INTERNAL MEDICINE
Payer: COMMERCIAL

## 2025-04-11 DIAGNOSIS — G89.29 CHRONIC PAIN OF RIGHT KNEE: ICD-10-CM

## 2025-04-11 DIAGNOSIS — M25.561 CHRONIC PAIN OF RIGHT KNEE: ICD-10-CM

## 2025-04-11 PROCEDURE — 73562 X-RAY EXAM OF KNEE 3: CPT | Performed by: INTERNAL MEDICINE

## 2025-04-14 ENCOUNTER — OFFICE VISIT (OUTPATIENT)
Dept: PODIATRY CLINIC | Facility: CLINIC | Age: 65
End: 2025-04-14

## 2025-04-14 DIAGNOSIS — E11.42 TYPE 2 DIABETES MELLITUS WITH DIABETIC POLYNEUROPATHY, WITHOUT LONG-TERM CURRENT USE OF INSULIN (HCC): Primary | ICD-10-CM

## 2025-04-14 DIAGNOSIS — Z89.419 HISTORY OF AMPUTATION OF GREAT TOE (HCC): ICD-10-CM

## 2025-04-14 DIAGNOSIS — L60.3 NAIL DYSTROPHY: ICD-10-CM

## 2025-04-14 DIAGNOSIS — L84 CALLUS OF FOOT: ICD-10-CM

## 2025-04-14 PROCEDURE — 99213 OFFICE O/P EST LOW 20 MIN: CPT | Performed by: STUDENT IN AN ORGANIZED HEALTH CARE EDUCATION/TRAINING PROGRAM

## 2025-04-14 NOTE — PROGRESS NOTES
[facility]  Progress Note      Flora Moraes is a 64 year old female.   Chief Complaint   Patient presents with    Diabetic Foot Care     Routine nail and foot care follow up - FBS not recorded - AIC 6.3 on 3/26/25 - LOV w Samanta Ahuja on 3/26/25              HPI:     Patient is a 64-year-old diabetic female presents to clinic for bilateral foot evaluation.  Denies any injuries and denies any acute signs of infection to bilateral feet.       Allergies: Ceftriaxone    Current Outpatient Medications   Medication Sig Dispense Refill    GLIMEPIRIDE 2 MG Oral Tab Take 1 tablet by mouth daily with breakfast 90 tablet 0    METFORMIN  MG Oral Tablet 24 Hr Take 2 tablets (1,000 mg total) by mouth 2 (two) times daily with meals. 360 tablet 0    dapagliflozin (FARXIGA) 10 MG Oral Tab Take 1 tablet (10 mg total) by mouth daily. 90 tablet 1    lisinopril 5 MG Oral Tab TAKE ONE TABLET BY MOUTH ONE TIME DAILY 90 tablet 1    latanoprost 0.005 % Ophthalmic Solution INSTILL 1 DROP INTO BOTH EYES EVERY MORNING 3 each 3    OneTouch UltraSoft Lancets Does not apply Misc Test 1x daily 100 each 1    Glucose Blood (ONETOUCH ULTRA) In Vitro Strip Test 1x daily 100 each 1    ACCU-CHEK SOFTCLIX LANCETS Does not apply Misc   0    Glucose Blood In Vitro Strip place 1 by Intradermal route  every day        Past Medical History:    Acute iritis    NG:  OD    After-cataract of left eye with vision obscured    Cataract    NG: OD    Cataract    NG:  OS    Episcleritis    NG:  OD    Ganglion    NG: Right thumb    High blood pressure    Iritis    NG: OD    Myopia with astigmatism and presbyopia    NG:  OU    Ocular hypertension    NG:  OS    Ophthalmological disorder    NG:  Macula pigmentation changes, OS, 2006    Type II or unspecified type diabetes mellitus without mention of complication, not stated as uncontrolled    NG:  No retinopathy, OU, 2006    Type II or unspecified type diabetes mellitus without mention of  complication, not stated as uncontrolled    NG:  No retinopathy, OU,     Type II or unspecified type diabetes mellitus without mention of complication, not stated as uncontrolled    NG:  No retinopathy, OU,     Type II or unspecified type diabetes mellitus without mention of complication, not stated as uncontrolled    NG:  No retinopathy, OU,     Type II or unspecified type diabetes mellitus without mention of complication, not stated as uncontrolled    NG: No retinopathy, OU,       Past Surgical History:   Procedure Laterality Date          Cataract extraction w/  intraocular lens implant Right 14     Phaco w/ PC IOL/general/vision blue/ RJM    Cataract extraction w/  intraocular lens implant Left 14    Phaco with PC IOL/ general/ Vision Blue/ RJM    Colonoscopy N/A 10/26/2022    Procedure: COLONOSCOPY;  Surgeon: Kathryn Ng MD;  [facility]: [facility]    Other surgical history      NG: Bunion/hammer toes surgery     Other surgical history      NG:  Excision ganglion, right thumb, 10/22/2013 - Lyle Seaman     Yag capsulotomy - od - right eye Right 2016    Dr. Andrade    Yag capsulotomy - os - left eye Left 16    [facility]      Family History   Problem Relation Age of Onset    Cancer Father         NG: Esophageal    Heart Disease Mother         NG:  CAD    Cancer Mother     Diabetes Mother     Psychiatric Paternal Grandmother     Ovarian Cancer Paternal Grandmother     Breast Cancer Cousin     Ovarian Cancer Paternal Cousin Female     Breast Cancer Paternal Cousin Female         pre isidro    Glaucoma Neg     Macular degeneration Neg       Social History     Socioeconomic History    Marital status:    Tobacco Use    Smoking status: Never    Smokeless tobacco: Never   Vaping Use    Vaping status: Never Used   Substance and Sexual Activity    Alcohol use: Yes     Comment:   Occasionally     Drug use: No   Other Topics Concern    Caffeine Concern Yes           REVIEW OF  SYSTEMS:     Denies nause, fever, chills  No calf pain  Denies chest pain or SOB      EXAM:   LMP  (LMP Unknown)   GENERAL: well developed, well nourished, in no apparent distress  EXTREMITIES:   1. Integument: Normal skin temperature and turgor. Toenails x 9 are elongated, thickened and discolored with subungal derbi.     2. Vascular: Dorsalis pedis two out of four bilateral and posterior tibial pulses two out of   four bilateral, capillary refill normal.   3. Musculoskeletal: All muscle groups are graded 5 out of 5 in the foot and ankle.  Status post partial right hallux amputation   4. Neurological: Normal sharp dull sensation; reflexes normal.             ASSESSMENT AND PLAN:   Diagnoses and all orders for this visit:    Type 2 diabetes mellitus with diabetic polyneuropathy, without long-term current use of insulin (HCC)    History of amputation of great toe (HCC)    Nail dystrophy    Callus of foot          Plan:       -Patient examined, chart history reviewed.  -Discussed importance of proper pedal hygiene, regular foot checks, and tight glucose control.  -Sharply debrided nails x 9 with a sterile nail nipper achieving a 20% reduction in thickness and length, without incident.   -pared HPK to delio feet with sterile #15 blade to healthy skin with no incident.   -advised pt to moisturize delio feet  -Ambulate with supportive shoes and inserts and avoid walking barefoot.  -Educated patient on acute signs of infection advised patient to seek immediate medical attention if symptoms arise.    RTC in 6 months      The patient indicates understanding of these issues and agrees to the plan.        Batsheva Dwyer DPM

## 2025-04-22 ENCOUNTER — TELEPHONE (OUTPATIENT)
Dept: ENDOCRINOLOGY CLINIC | Facility: CLINIC | Age: 65
End: 2025-04-22

## 2025-04-22 NOTE — TELEPHONE ENCOUNTER
Per Dr. Ahuja: Please call patient - she does have significant arthritis on xray.  I would recommend evaluation by orthopedics. Thanks.     Called pt, LVM to call back.

## 2025-04-23 NOTE — TELEPHONE ENCOUNTER
Patient returned call (Name and  of pt verified). All results and recommendations reviewed. Patient verbalizes understanding, denies further questions and agrees with plan of care. Provided phone number for [facility] orthopedics: [phone].    Dr. Ahuja, patient inquiring if you have a particular physician recommendation or referral. Thank you.

## 2025-04-23 NOTE — TELEPHONE ENCOUNTER
Called pt and notified that Dr. Ahuja does not have a preferred MD. Patient verbalized understanding.

## 2025-05-20 ENCOUNTER — OFFICE VISIT (OUTPATIENT)
Age: 65
End: 2025-05-20
Payer: COMMERCIAL

## 2025-05-20 ENCOUNTER — HOSPITAL ENCOUNTER (OUTPATIENT)
Dept: GENERAL RADIOLOGY | Facility: HOSPITAL | Age: 65
Discharge: HOME | End: 2025-05-20
Attending: ORTHOPAEDIC SURGERY
Payer: COMMERCIAL

## 2025-05-20 VITALS — WEIGHT: 132 LBS | BODY MASS INDEX: 21.21 KG/M2 | HEIGHT: 66 IN

## 2025-05-20 DIAGNOSIS — R52 PAIN: Primary | ICD-10-CM

## 2025-05-20 DIAGNOSIS — M16.11 PRIMARY OSTEOARTHRITIS OF RIGHT HIP: ICD-10-CM

## 2025-05-20 DIAGNOSIS — R52 PAIN: ICD-10-CM

## 2025-05-20 PROCEDURE — 3008F BODY MASS INDEX DOCD: CPT | Performed by: ORTHOPAEDIC SURGERY

## 2025-05-20 PROCEDURE — 73502 X-RAY EXAM HIP UNI 2-3 VIEWS: CPT | Performed by: ORTHOPAEDIC SURGERY

## 2025-05-20 PROCEDURE — 99205 OFFICE O/P NEW HI 60 MIN: CPT | Performed by: ORTHOPAEDIC SURGERY

## 2025-05-20 NOTE — PROGRESS NOTES
Chief Complaint: Knee Pain      HPI  Ms. Moraes is referred by No ref. provider found. Flora Moraes is a 64 year old female being seen in the office today for Knee Pain    Patient is a 64-year-old female presents today with complaints of right knee pain.  She states the pain has been present for over a year.  About 1 month ago she noted worsening pain.  She did have a fall which she states may have exacerbated her symptoms.  Her pain localizes anteriorly and medially.  She states that she has minimal pain with weightbearing.  She has trouble mostly at night.  She also has some trouble with motion, cannot put her shoes and socks on.  She has trouble navigating stairs.  She does have trouble getting in the seated position.  She has not had any formal treatment.      History:  Past Medical History[1]  Past Surgical History[2]  Family History[3]  Family Status   Relation Status    Fa     Mo     PGMA (Not Specified)    Cousin  at age 49    Pat Cous Fem (Not Specified)    Pat Cous Fem (Not Specified)    NEG (Not Specified)     Social History     Occupational History    Not on file   Tobacco Use    Smoking status: Never    Smokeless tobacco: Never   Vaping Use    Vaping status: Never Used   Substance and Sexual Activity    Alcohol use: Yes     Comment:   Occasionally     Drug use: No    Sexual activity: Not on file       Medications:  Current Medications[4]    Allergies:  Allergies[5]    Review of Systems  A comprehensive review of systems was completed and is negative unless noted above or in the HPI.    Physical Exam  LMP  (LMP Unknown)   There is no height or weight on file to calculate BMI.    Constitutional: The patient appears well-developed and well-nourished, in no apparent distress.   Psychiatric: The patient demonstrates good comprehension, judgment and decision making. Normal mood and affect.  Eyes: PER and EOM are normal.  ENT: Hearing appropriate for normal  conversation.  Cardiovascular: The patient has symmetric pulses, 2+.  Distal extremity is warm and well perfused with good capillary refill.  Respiratory:  The patient is breathing comfortably without increased respiratory effort or use of accessory muscles.  Hematologic/Lymphatic: No lymphangitis. There is no appreciable enlargement of lymph nodes. No calf swelling, calf non-tender, negative Hoyt's sign. No edema.  Skin: No wounds or ulcers. No hypertrophic scarring.  Neck: FROM without pain.  MSK:  Gait:     Right Knee        Alignment: Neutral, no obvious deformity       Skin: Intact at the knee       Swelling: None   Effusion: Trace   Tenderness: None       Range of Motion:      Extension: 0     Flexion: 130     Flexion Contracture: 0     Extensor La           McMurrays: Negative   Lachmann: Negative   Anterior Drawer: Negative   Posterior Drawer: Negative   Varus Stress Test: stable   Valgus Stress Test: stable       Patellar Tracking: Centrally   Patellar Crepitus: No   Extensor Mechanism: Intact       Hip ROM:   Intact       Sensation: intact   Motor: intact   Vascular: intact     Right Hip        Skin: NA       Deformity: None       Tenderness: None       Range of Motion:      Extension: 10     Flexion Contracture: 10     Flexion: 80     Internal Rotation: 5     External Rotation: 20     Abduction: 10     Adduction: 10       Muscle Strength      Abduction: 5/5     Adduction: 5/5     Flexion: 4/5         Gait: Normal and Antalgic   Trendelenburg Lurch Negative   Trendelenburg Sign Negative   Stinchfield: Positive   Ventura Test: Negative   Dudley Test: Negative   Anterior Impingement: Positive   Posterior Impingement: Negative   Leg Length Discrepancy Negative       Motor: intact   Sensory: intact   Vascular: intact            Imaging:  I independently reviewed the patient's relevant imaging studies today.    4 weightbearing views of the affected knee were obtained today.  They demonstrate no acute fracture  or dislocation.  They show minimal joint space narrowing, osteophyte formation, and subchondral sclerosis Kellgren-Anoop grade 1 OA.    Standing AP pelvis and 2 views of the right hip were obtained today.  They demonstrate joint space narrowing, osteophyte formation, subchondral cystic changes and sclerosis consistent with Tonis grade 3 osteoarthritis of the right hip.  No acute fracture or dislocation is identified.    Labs:  Lab Results   Component Value Date    WBC 6.9 10/10/2024    HGB 13.3 10/10/2024    HCT 38.3 10/10/2024    .0 10/10/2024     Lab Results   Component Value Date    ALB 4.2 10/10/2024    A1C 6.3 (A) 03/26/2025     Lab Results   Component Value Date     (H) 10/10/2024     (H) 03/31/2024    GLU 99 03/17/2023       Assessment and Plan  Assessment & Plan  Pain    Orders:    XR HIP W OR WO PELVIS 2 OR 3 VIEWS, RIGHT (CPT=73502) [[id number]] - Orthopedic providers only; Future    Primary osteoarthritis of right hip         I had a lengthy discussion with the patient today about the patient's symptoms.  Based on her clinical examination, radiographs, her knee pain is related to her right hip osteoarthritis.  This is not an uncommon finding.  I recommend treatment for this pathology.   Non operative and operative treatment options were discussed. In general, non operative management would include activity modification, rest, anti-inflammatories, physical therapy, weight loss if applicable, intraarticular cortisone injections and the use of assistive devices.  If they fail these modalities and continue to have severe life altering pain and functional limitations, a total hip arthroplasty may be warranted.      Given the severity of the disease, which is now limiting her function and negatively affecting her quality of life, I recommend surgical intervention in the form of a total hip arthroplasty.      I had a lengthy discussion about joint replacement.  They were also given [facility]  handouts on joint replacement.  We discussed the importance of preoperative clearances, labs and appropriate studies.  We discussed the preoperative, intraoperative and postoperative course related to the surgery.  We discussed the importance of postoperative rehabilitation after surgery.  Risks, benefits and alternatives of the surgery were discussed.  Risks include but are not limited to superficial and deep infection, DVT/PE, damage to surrounding nerves and vessels, bleeding, need for blood transfusion, injury to surrounding structures including nerves, vessels, ligaments, tendons or bone, iatrogenic fractures, dislocation/instability, leg length inequality, loosening, polyethylene wear, osteolysis, continued pain, need for future surgeries, risks for anesthesia including MI, stroke, loss of limb, or loss of life.  Understanding of all topics was conveyed to me by the patient.  She would like to discuss this with the family and let us know what she decides.    As such, I recommend the following:    Surgery scheduling was provided for the patient.  She will give us a call if she decides to proceed with surgery..  They will need medical clearance and dental clearance.   In the interim, they will continue activity modification, Tylenol, anti-inflammatory medication ( until 1 week prior to surgery date), and assistive device usage for symptomatic relief.  Activities: As tolerated  Follow-up: As needed    BMI is is in the acceptable range    Reynaldo Harden MD    Rationale for 57 Modifier Addendum    Decision for Major Surgery  The decision for a major surgery was made during this visit/consultation based on review and discussion of the history of presentation, examination, available labs/imaging, and the patient's functional status. The medical and surgical history were considered with regards to risk and potential modifications needed.         [1]   Past Medical History:   Acute iritis    NG:  OD     After-cataract of left eye with vision obscured    Cataract    NG: OD    Cataract    NG:  OS    Episcleritis    NG:  OD    Ganglion    NG: Right thumb    High blood pressure    Iritis    NG: OD    Myopia with astigmatism and presbyopia    NG:  OU    Ocular hypertension    NG:  OS    Ophthalmological disorder    NG:  Macula pigmentation changes, OS, 2006    Type II or unspecified type diabetes mellitus without mention of complication, not stated as uncontrolled    NG:  No retinopathy, OU, 2006    Type II or unspecified type diabetes mellitus without mention of complication, not stated as uncontrolled    NG:  No retinopathy, OU,     Type II or unspecified type diabetes mellitus without mention of complication, not stated as uncontrolled    NG:  No retinopathy, OU,     Type II or unspecified type diabetes mellitus without mention of complication, not stated as uncontrolled    NG:  No retinopathy, OU,     Type II or unspecified type diabetes mellitus without mention of complication, not stated as uncontrolled    NG: No retinopathy, OU,    [2]   Past Surgical History:  Procedure Laterality Date          Cataract extraction w/  intraocular lens implant Right 14     Phaco w/ PC IOL/general/vision blue/ RJM    Cataract extraction w/  intraocular lens implant Left 14    Phaco with PC IOL/ general/ Vision Blue/ RJM    Colonoscopy N/A 10/26/2022    Procedure: COLONOSCOPY;  Surgeon: Kathryn Ng MD;  Location: [facility]    Other surgical history      NG: Bunion/hammer toes surgery     Other surgical history      NG:  Excision ganglion, right thumb, 10/22/2013 - Lyle Seaman     Yag capsulotomy - od - right eye Right 2016    Dr. Andrade    Yag capsulotomy - os - left eye Left 16    RJPUNEET   [3]   Family History  Problem Relation Age of Onset    Cancer Father         NG: Esophageal    Heart Disease Mother         NG:  CAD    Cancer Mother     Diabetes Mother     Psychiatric  Paternal Grandmother     Ovarian Cancer Paternal Grandmother     Breast Cancer Cousin     Ovarian Cancer Paternal Cousin Female     Breast Cancer Paternal Cousin Female         pre isidro    Glaucoma Neg     Macular degeneration Neg    [4]   Current Outpatient Medications   Medication Sig Dispense Refill    GLIMEPIRIDE 2 MG Oral Tab Take 1 tablet by mouth daily with breakfast 90 tablet 0    METFORMIN  MG Oral Tablet 24 Hr Take 2 tablets (1,000 mg total) by mouth 2 (two) times daily with meals. 360 tablet 0    dapagliflozin (FARXIGA) 10 MG Oral Tab Take 1 tablet (10 mg total) by mouth daily. 90 tablet 1    lisinopril 5 MG Oral Tab TAKE ONE TABLET BY MOUTH ONE TIME DAILY 90 tablet 1    latanoprost 0.005 % Ophthalmic Solution INSTILL 1 DROP INTO BOTH EYES EVERY MORNING 3 each 3    OneTouch UltraSoft Lancets Does not apply Misc Test 1x daily 100 each 1    Glucose Blood (ONETOUCH ULTRA) In Vitro Strip Test 1x daily 100 each 1    ACCU-CHEK SOFTCLIX LANCETS Does not apply Misc   0    Glucose Blood In Vitro Strip place 1 by Intradermal route  every day     [5]   Allergies  Allergen Reactions    Ceftriaxone RASH

## 2025-05-21 PROBLEM — M16.11 PRIMARY OSTEOARTHRITIS OF RIGHT HIP: Status: ACTIVE | Noted: 2025-05-21

## 2025-05-27 RX ORDER — GLIMEPIRIDE 2 MG/1
2 TABLET ORAL
Qty: 90 TABLET | Refills: 0 | Status: SHIPPED | OUTPATIENT
Start: 2025-05-27

## 2025-05-27 NOTE — TELEPHONE ENCOUNTER
Endocrine Refill protocol for oral and injectable diabetic medications    Protocol Criteria:  PASSED  Reason: N/A    If all below requirements are met, send a 90-day supply with 1 refill per provider protocol.    Verify appointment with Endocrinology completed in the last 6 months or scheduled in the next 3 months.  Verify A1C has been completed within the last 6 months and is below 8.5%     Last completed office visit: 3/26/2025 Sarah Ahuja MD   Next scheduled Follow up: No future appointments.   Last A1c result: Last A1c value was 6.3% done 3/26/2025.

## 2025-06-02 ENCOUNTER — LAB ENCOUNTER (OUTPATIENT)
Dept: LAB | Age: 65
End: 2025-06-02
Attending: Nurse Practitioner
Payer: COMMERCIAL

## 2025-06-02 ENCOUNTER — NURSE TRIAGE (OUTPATIENT)
Dept: FAMILY MEDICINE CLINIC | Facility: CLINIC | Age: 65
End: 2025-06-02

## 2025-06-02 ENCOUNTER — OFFICE VISIT (OUTPATIENT)
Dept: INTERNAL MEDICINE CLINIC | Facility: CLINIC | Age: 65
End: 2025-06-02

## 2025-06-02 VITALS
WEIGHT: 146.81 LBS | TEMPERATURE: 98 F | HEART RATE: 57 BPM | BODY MASS INDEX: 23.59 KG/M2 | HEIGHT: 66 IN | SYSTOLIC BLOOD PRESSURE: 126 MMHG | DIASTOLIC BLOOD PRESSURE: 58 MMHG | OXYGEN SATURATION: 98 %

## 2025-06-02 DIAGNOSIS — R41.3 MEMORY CHANGES: ICD-10-CM

## 2025-06-02 DIAGNOSIS — R29.6 FALLS: Primary | ICD-10-CM

## 2025-06-02 DIAGNOSIS — G24.9 DYSTONIA: ICD-10-CM

## 2025-06-02 DIAGNOSIS — R29.6 FALLS: ICD-10-CM

## 2025-06-02 LAB
ALBUMIN SERPL-MCNC: 4.9 G/DL (ref 3.2–4.8)
ALBUMIN/GLOB SERPL: 2 {RATIO} (ref 1–2)
ALP LIVER SERPL-CCNC: 103 U/L (ref 50–130)
ALT SERPL-CCNC: 29 U/L (ref 10–49)
ANION GAP SERPL CALC-SCNC: 8 MMOL/L (ref 0–18)
AST SERPL-CCNC: 26 U/L (ref ?–34)
BASOPHILS # BLD AUTO: 0.03 X10(3) UL (ref 0–0.2)
BASOPHILS NFR BLD AUTO: 0.4 %
BILIRUB SERPL-MCNC: 0.3 MG/DL (ref 0.2–1.1)
BUN BLD-MCNC: 14 MG/DL (ref 9–23)
BUN/CREAT SERPL: 16.9 (ref 10–20)
CALCIUM BLD-MCNC: 10.2 MG/DL (ref 8.7–10.4)
CHLORIDE SERPL-SCNC: 104 MMOL/L (ref 98–112)
CO2 SERPL-SCNC: 29 MMOL/L (ref 21–32)
CREAT BLD-MCNC: 0.83 MG/DL (ref 0.55–1.02)
DEPRECATED RDW RBC AUTO: 41.7 FL (ref 35.1–46.3)
EGFRCR SERPLBLD CKD-EPI 2021: 79 ML/MIN/1.73M2 (ref 60–?)
EOSINOPHIL # BLD AUTO: 0.15 X10(3) UL (ref 0–0.7)
EOSINOPHIL NFR BLD AUTO: 2.1 %
ERYTHROCYTE [DISTWIDTH] IN BLOOD BY AUTOMATED COUNT: 12.1 % (ref 11–15)
ERYTHROCYTE [SEDIMENTATION RATE] IN BLOOD: 5 MM/HR (ref 0–30)
FASTING STATUS PATIENT QL REPORTED: NO
GLOBULIN PLAS-MCNC: 2.4 G/DL (ref 2–3.5)
GLUCOSE BLD-MCNC: 146 MG/DL (ref 70–99)
HCT VFR BLD AUTO: 42.9 % (ref 35–48)
HGB BLD-MCNC: 14.6 G/DL (ref 12–16)
IMM GRANULOCYTES # BLD AUTO: 0.01 X10(3) UL (ref 0–1)
IMM GRANULOCYTES NFR BLD: 0.1 %
LYMPHOCYTES # BLD AUTO: 2.54 X10(3) UL (ref 1–4)
LYMPHOCYTES NFR BLD AUTO: 35.4 %
MCH RBC QN AUTO: 31.7 PG (ref 26–34)
MCHC RBC AUTO-ENTMCNC: 34 G/DL (ref 31–37)
MCV RBC AUTO: 93.1 FL (ref 80–100)
MONOCYTES # BLD AUTO: 0.5 X10(3) UL (ref 0.1–1)
MONOCYTES NFR BLD AUTO: 7 %
NEUTROPHILS # BLD AUTO: 3.95 X10 (3) UL (ref 1.5–7.7)
NEUTROPHILS # BLD AUTO: 3.95 X10(3) UL (ref 1.5–7.7)
NEUTROPHILS NFR BLD AUTO: 55 %
OSMOLALITY SERPL CALC.SUM OF ELEC: 295 MOSM/KG (ref 275–295)
PLATELET # BLD AUTO: 276 10(3)UL (ref 150–450)
POTASSIUM SERPL-SCNC: 4.5 MMOL/L (ref 3.5–5.1)
PROT SERPL-MCNC: 7.3 G/DL (ref 5.7–8.2)
RBC # BLD AUTO: 4.61 X10(6)UL (ref 3.8–5.3)
SODIUM SERPL-SCNC: 141 MMOL/L (ref 136–145)
WBC # BLD AUTO: 7.2 X10(3) UL (ref 4–11)

## 2025-06-02 PROCEDURE — 80053 COMPREHEN METABOLIC PANEL: CPT

## 2025-06-02 PROCEDURE — 36415 COLL VENOUS BLD VENIPUNCTURE: CPT

## 2025-06-02 PROCEDURE — 3078F DIAST BP <80 MM HG: CPT | Performed by: NURSE PRACTITIONER

## 2025-06-02 PROCEDURE — 86225 DNA ANTIBODY NATIVE: CPT

## 2025-06-02 PROCEDURE — 85652 RBC SED RATE AUTOMATED: CPT

## 2025-06-02 PROCEDURE — 99214 OFFICE O/P EST MOD 30 MIN: CPT | Performed by: NURSE PRACTITIONER

## 2025-06-02 PROCEDURE — 86038 ANTINUCLEAR ANTIBODIES: CPT

## 2025-06-02 PROCEDURE — 3008F BODY MASS INDEX DOCD: CPT | Performed by: NURSE PRACTITIONER

## 2025-06-02 PROCEDURE — 3074F SYST BP LT 130 MM HG: CPT | Performed by: NURSE PRACTITIONER

## 2025-06-02 PROCEDURE — 85025 COMPLETE CBC W/AUTO DIFF WBC: CPT

## 2025-06-02 NOTE — TELEPHONE ENCOUNTER
Action Requested: Summary for Provider     []  Critical Lab, Recommendations Needed  [] Need Additional Advice  [x]   FYI    []   Need Orders  [] Need Medications Sent to Pharmacy  []  Other     SUMMARY: Per protocol, patient should be seen in the office within 3 days. Appointment scheduled.    Future Appointments   Date Time Provider Department Center   6/2/2025  3:30 PM Lissy Perales [facility]   6/4/2025  2:45 PM Reynaldo Harden MD [facility]      Reason for call: Fall, requesting to see a counselor.  Onset: 2 Weeks Ago    Patient reports of falling in the basement last night. States she was going down the stairs and fell \"6 steps\". She was able to get up and walk after the fall. Area between the left shoulder and elbow, and the right elbow are bruised. Otherwise no other injury sustained. Denies pain when walking but walks with a limp which she has always had before.     States she did fall 2 weeks ago. She cannot tell if she was sleepwalking at that time but she found herself on the floor that night. Hurt the palm of her hand from bracing the fall. She cannot recall how she got on the floor. She cannot remember the rest of the times she fell in the past.    States she does not walk normally, like when going up the stairs she only puts pressure in the left leg because she has a sensation that her right leg is going to give out. She has been following up with Ortho and was advised to get a surgery on the right hip but is something that she cannot deal with right now.    Patient becomes very tearful as she discussed her situation with her , who did not even come to check on her last night when she fell. She has been having a strained relationship with spouse for quite some time now, not talking to each other even if they live in the same house, and thinks she needs to get a divorce.    Despite of this, she is still very concerned of her . Spouse has a major thing  going on with his family and has a lot of things in his plate - mother-in-law suffers from severe depression with history of suicide, needs to go to a home but needs to get evaluated at the hospital first. Spouse's sister is not around so the burden is all on her  and herself since they are the ones who gets the calls.    She does not want her  to have the same kind of reaction as his mother's with the depression and suicide. States spouse is very secretive and does not tell her any information about his health. She does not even know who is his PCP, only that he is being treated for thyroid imbalance. Patient tried reaching out to her children to check on their dad. They tried counseling in the past but she was the only one who attended the sessions.     Only positive thing she has heard from spouse recently is that he agrees for her to see a counselor again after she apologized to him being not the perfect wife.     Care advice given per protocol. Advised to schedule an appointment for evaluation. Patient verbalized understanding and agreed with plan of care.    Call lasted 27 minutes.    Reason for Disposition   Patient wants to be seen    Protocols used: Falls and [id number]

## 2025-06-02 NOTE — PATIENT INSTRUCTIONS
Please call to scheduled a CT scan of your head    Please call to schedule an appointment with a neurologist    Please call to schedule an appointment with our  for assistance finding a therapist

## 2025-06-02 NOTE — PROGRESS NOTES
Subjective:   Flora Moraes is a 64 year old female with PMH HTN, HL, T2DM who presents for Fall     History of Present Illness  Abridge tool was used for dictation purposes only and the patient was not recorded at any point during the visit.     Ms. Flora Moraes is a 64 year old female who presents with recent falls and memory issues.    She has experienced 2 falls over the last couple of weeks. Two weeks ago, she fell in her bedroom and does not recall the events leading to the fall or waking up. She found herself on the floor upon waking but did not sustain any injuries. Last night, she fell down six steps in the basement, possibly due to slipping while wearing socks. She has some bruising on her left upper outer arm and right inner elbow, but no pain or deformity. She exhibits some gait instability, which she attributes to her right knee.    She has difficulty walking and an inability to bend her right leg to put on her shoe, attributing this to right knee pain. An orthopedic consultation suggested the issue is related to her hip rather than her knee.  She was advised to have hip replacement, but states \"I do not have time for that.\"    She reports recent memory issues over the past few months, including forgetting plans and times for events, which is irritating to her  and their daughter. Her  has noted these issues and uses post-its to help her remember things rather than talk to her about them. This has contributed to marital difficulties.    She is experiencing emotional distress related to her marital situation, stating that her , who sleeps in the basement, did not assist her after her fall. She has been having conflicts since Friday, primarily about their relationship, and her  has expressed a desire for divorce after forty years of marriage. This is her third marriage, and she feels guilty for 'cheating him out of a wife.' She said they rarely communicate,  and he is upset about her recent memory issues because he already has a lot of stress due to his own mother's mental illness. She is interested in pursuing therapy for her marital issues, although her  is currently unwilling to participate.    She said some unusual things during the visit - such as \"when I put my head back time goes by so fast and I feel like I'm going to fall\" when I asked her if she had neck pain.    She denies drinking etoh or using drugs.    History/Other:    Chief Complaint Reviewed and Verified  No Further Nursing Notes to   Review  Tobacco Reviewed  Allergies Reviewed  Medications Reviewed    Problem List Reviewed  Medical History Reviewed  Surgical History   Reviewed  OB Status Reviewed  Family History Reviewed  Social History   Reviewed         Tobacco:  She has never smoked tobacco.    Current Medications[1]      Review of Systems:  Review of Systems  10 point review of systems otherwise negative with the exception of HPI and assessment and plan.    Objective:   /58   Pulse 57   Temp 98.1 °F (36.7 °C) (Temporal)   Ht 5' 6\" (1.676 m)   Wt 146 lb 12.8 oz (66.6 kg)   LMP  (LMP Unknown)   SpO2 98%   BMI 23.69 kg/m²  Estimated body mass index is 23.69 kg/m² as calculated from the following:    Height as of this encounter: 5' 6\" (1.676 m).    Weight as of this encounter: 146 lb 12.8 oz (66.6 kg).        Physical Exam  Vitals reviewed.   Constitutional:       General: She is not in acute distress.  HENT:      Head: Normocephalic.      Right Ear: Tympanic membrane normal.      Left Ear: Tympanic membrane normal.      Mouth/Throat:      Mouth: Mucous membranes are moist.      Pharynx: Oropharynx is clear.   Eyes:      Extraocular Movements: Extraocular movements intact.      Conjunctiva/sclera: Conjunctivae normal.      Pupils: Pupils are equal, round, and reactive to light.   Cardiovascular:      Rate and Rhythm: Normal rate and regular rhythm.      Heart sounds:  Normal heart sounds. No murmur heard.  Pulmonary:      Effort: Pulmonary effort is normal. No respiratory distress.      Breath sounds: Normal breath sounds.   Musculoskeletal:      Comments: 2 bruises L upper lateral arm; no deformity/swelling or decreased ROM  Bruising R medial antecubital area. No swelling, deformity, decreased ROM.   Neurological:      General: No focal deficit present.      Mental Status: She is alert and oriented to person, place, and time.      GCS: GCS eye subscore is 4. GCS verbal subscore is 5. GCS motor subscore is 6.      Motor: Motor function is intact. No weakness, tremor or pronator drift.      Coordination: Finger-Nose-Finger Test normal.      Comments: Dystonia present BUE/upper body. Slight limping gait which she attributes to her R knee pain.   Psychiatric:         Mood and Affect: Affect is tearful.      Comments: Thought process is scattered; difficulty staying on topic       Assessment & Plan:   1. Falls  - Neuro Referral - In Network  - CT BRAIN OR HEAD (CPT=70450); Future  - Connective Tissue Disease (TESSA) Screen [E]; Future  - Comp Metabolic Panel (14) [E]; Future  - CBC W Differential W Platelet [E]; Future  - Sed Rate, Westergren (Automated) [E]; Future  - Ceruloplasmin [E]; Future    2. Dystonia  - Connective Tissue Disease (TESSA) Screen [E]; Future  - Comp Metabolic Panel (14) [E]; Future  - CBC W Differential W Platelet [E]; Future  - Sed Rate, Westergren (Automated) [E]; Future  - Ceruloplasmin [E]; Future    3. Memory changes  - Neuro Referral - In Network  - [facility]FERR[facility] TO [facility]  - CT BRAIN OR HEAD (CPT=70450); Future  - Connective Tissue Disease (TESSA) Screen [E]; Future  - Comp Metabolic Panel (14) [E]; Future  - CBC W Differential W Platelet [E]; Future  - Sed Rate, Westergren (Automated) [E]; Future    4. Left arm pain    Assessment & Plan  Falls  Recent falls with unclear memories of each event. Bruising from falls. Possible causes: gait instability, knee pain,  emotional distress.  - Evaluate for neurological causes.  - Discuss emotional support or counseling options.    Dystonic movements  Dystonic movements in arms and head. Neurological exam grossly intact.  - Refer to neurology for further evaluation.    Memory issues  Ongoing memory issues possibly exacerbated by emotional stress from marital difficulties.  - Referral for counseling / consider neuropsychological evaluation for cognitive function.  - Discuss cognitive therapy or memory aids.    Right hip pain  Right knee pain linked to hip issue. Declined surgery, interested in alternative treatments.  - has follow up with orthopedics this week          Return if symptoms worsen or fail to improve, and call, for annual physical.    CATRACHITA Meehan, 6/2/2025, 2:46 PM     This note was prepared using Dragon Medical voice recognition dictation software. As a result errors may occur. When identified, these errors have been corrected. While every attempt is made to correct errors during dictation discrepancies may still exist.         [1]   Current Outpatient Medications   Medication Sig Dispense Refill    GLIMEPIRIDE 2 MG Oral Tab Take 1 tablet by mouth daily with breakfast 90 tablet 0    METFORMIN  MG Oral Tablet 24 Hr Take 2 tablets (1,000 mg total) by mouth 2 (two) times daily with meals. 360 tablet 0    dapagliflozin (FARXIGA) 10 MG Oral Tab Take 1 tablet (10 mg total) by mouth daily. 90 tablet 1    lisinopril 5 MG Oral Tab TAKE ONE TABLET BY MOUTH ONE TIME DAILY 90 tablet 1    latanoprost 0.005 % Ophthalmic Solution INSTILL 1 DROP INTO BOTH EYES EVERY MORNING 3 each 3    OneTouch UltraSoft Lancets Does not apply Misc Test 1x daily 100 each 1    Glucose Blood (ONETOUCH ULTRA) In Vitro Strip Test 1x daily 100 each 1    ACCU-CHEK SOFTCLIX LANCETS Does not apply Misc   0    Glucose Blood In Vitro Strip place 1 by Intradermal route  every day

## 2025-06-03 LAB
DSDNA IGG SERPL IA-ACNC: 1.9 IU/ML (ref ?–10)
ENA AB SER QL IA: 0.4 UG/L (ref ?–0.7)
ENA AB SER QL IA: NEGATIVE

## 2025-06-05 ENCOUNTER — HOSPITAL ENCOUNTER (OUTPATIENT)
Dept: CT IMAGING | Facility: HOSPITAL | Age: 65
Discharge: HOME | End: 2025-06-05
Attending: Nurse Practitioner
Payer: COMMERCIAL

## 2025-06-05 DIAGNOSIS — R29.6 FALLS: ICD-10-CM

## 2025-06-05 DIAGNOSIS — R41.3 MEMORY CHANGES: ICD-10-CM

## 2025-06-05 PROCEDURE — 70450 CT HEAD/BRAIN W/O DYE: CPT | Performed by: NURSE PRACTITIONER

## 2025-07-01 ENCOUNTER — OFFICE VISIT (OUTPATIENT)
Dept: FAMILY MEDICINE CLINIC | Facility: CLINIC | Age: 65
End: 2025-07-01

## 2025-07-01 VITALS
HEART RATE: 56 BPM | BODY MASS INDEX: 24.43 KG/M2 | TEMPERATURE: 98 F | HEIGHT: 66 IN | WEIGHT: 152 LBS | DIASTOLIC BLOOD PRESSURE: 64 MMHG | RESPIRATION RATE: 18 BRPM | SYSTOLIC BLOOD PRESSURE: 135 MMHG | OXYGEN SATURATION: 99 %

## 2025-07-01 DIAGNOSIS — M16.11 PRIMARY OSTEOARTHRITIS OF RIGHT HIP: ICD-10-CM

## 2025-07-01 DIAGNOSIS — R41.3 MEMORY LOSS: ICD-10-CM

## 2025-07-01 DIAGNOSIS — F32.A DEPRESSION, UNSPECIFIED DEPRESSION TYPE: ICD-10-CM

## 2025-07-01 DIAGNOSIS — R26.9 GAIT ABNORMALITY: ICD-10-CM

## 2025-07-01 DIAGNOSIS — I10 ESSENTIAL HYPERTENSION: ICD-10-CM

## 2025-07-01 DIAGNOSIS — E11.65 TYPE 2 DIABETES MELLITUS WITH HYPERGLYCEMIA, WITHOUT LONG-TERM CURRENT USE OF INSULIN (HCC): ICD-10-CM

## 2025-07-01 DIAGNOSIS — Z00.00 ROUTINE GENERAL MEDICAL EXAMINATION AT A HEALTH CARE FACILITY: Primary | ICD-10-CM

## 2025-07-01 PROBLEM — S89.91XA INJURY OF RIGHT KNEE: Status: RESOLVED | Noted: 2022-05-12 | Resolved: 2025-07-01

## 2025-07-01 PROBLEM — Z12.83 SKIN EXAM, SCREENING FOR CANCER: Status: RESOLVED | Noted: 2022-04-14 | Resolved: 2025-07-01

## 2025-07-01 PROBLEM — L98.9 FACE LESION: Status: RESOLVED | Noted: 2022-04-12 | Resolved: 2025-07-01

## 2025-07-01 PROBLEM — B35.1 TOENAIL FUNGUS: Status: RESOLVED | Noted: 2018-12-27 | Resolved: 2025-07-01

## 2025-07-01 PROBLEM — N63.10 BREAST MASS, RIGHT: Status: RESOLVED | Noted: 2021-11-16 | Resolved: 2025-07-01

## 2025-07-01 PROBLEM — M79.644 PAIN OF RIGHT THUMB: Status: RESOLVED | Noted: 2022-04-12 | Resolved: 2025-07-01

## 2025-07-01 PROBLEM — S91.109A WOUND, OPEN, TOE: Status: RESOLVED | Noted: 2018-12-27 | Resolved: 2025-07-01

## 2025-07-01 NOTE — PROGRESS NOTES
Subjective:   Flora Moraes is a 64 year old female who presents for Physical       History/Other:    Chief Complaint Reviewed and Verified  No Further Nursing Notes to   Review  Allergies Reviewed  Medications Reviewed         Tobacco:  She has never smoked tobacco.    Current Medications[1]      Review of Systems:  Review of Systems   Constitutional: Negative.    HENT: Negative.     Eyes: Negative.    Respiratory: Negative.     Cardiovascular: Negative.    Gastrointestinal: Negative.    Genitourinary: Negative.    Musculoskeletal: Negative.    Skin: Negative.    Neurological: Negative.    Psychiatric/Behavioral:  Positive for decreased concentration. The patient is nervous/anxious.          Objective:   /64   Pulse 56   Temp 97.5 °F (36.4 °C) (Temporal)   Resp 18   Ht 5' 6\" (1.676 m)   Wt 152 lb (68.9 kg)   LMP  (LMP Unknown)   SpO2 99%   BMI 24.53 kg/m²  Estimated body mass index is 24.53 kg/m² as calculated from the following:    Height as of this encounter: 5' 6\" (1.676 m).    Weight as of this encounter: 152 lb (68.9 kg).  Physical Exam  Vitals reviewed.   Constitutional:       Appearance: Normal appearance. She is well-developed.   HENT:      Head: Normocephalic.      Right Ear: Hearing, tympanic membrane and ear canal normal.      Left Ear: Hearing, tympanic membrane and ear canal normal.      Nose: Nose normal.   Eyes:      Conjunctiva/sclera: Conjunctivae normal.      Pupils: Pupils are equal, round, and reactive to light.      Funduscopic exam:     Right eye: No hemorrhage or AV nicking.         Left eye: No hemorrhage or AV nicking.   Neck:      Thyroid: No thyroid mass or thyromegaly.   Cardiovascular:      Heart sounds: Normal heart sounds.   Pulmonary:      Breath sounds: Normal breath sounds.   Abdominal:      Tenderness: There is no abdominal tenderness.   Musculoskeletal:      Cervical back: Normal range of motion and neck supple.      Lumbar back: Normal.    Lymphadenopathy:      Upper Body:      Right upper body: No supraclavicular adenopathy.      Left upper body: No supraclavicular adenopathy.   Skin:     Comments: No suspicious findings waist up exam   Neurological:      General: No focal deficit present.      Mental Status: She is alert and oriented to person, place, and time.      Sensory: No sensory deficit.      Coordination: Coordination abnormal.      Gait: Gait abnormal.      Deep Tendon Reflexes: Reflexes are normal and symmetric.   Psychiatric:         Mood and Affect: Mood is depressed. Mood is not anxious.           Assessment & Plan:   1. Routine general medical examination at a health care facility (Primary)  2. Essential hypertension  3. Type 2 diabetes mellitus with hyperglycemia, without long-term current use of insulin ([facility])  4. Gait abnormality  -     NEURO - INTERNAL  -     MRI BRAIN (CPT=70551); Future; Expected date: 07/01/2025  5. Memory loss  -     MRI BRAIN (CPT=70551); Future; Expected date: 07/01/2025  6. Depression, unspecified depression type  -     Behavioral Health Consultation    Gets labs through endocrinologist.  Last were stable.    Recommend seeing neurologist for coordination and apparent movement disorder.  MRI prior to that visit. See behavioral health for her depression.        No follow-ups on file.    Conor Dobbs DO, 7/1/2025, 3:19 PM        [1]   Current Outpatient Medications   Medication Sig Dispense Refill    GLIMEPIRIDE 2 MG Oral Tab Take 1 tablet by mouth daily with breakfast 90 tablet 0    METFORMIN  MG Oral Tablet 24 Hr Take 2 tablets (1,000 mg total) by mouth 2 (two) times daily with meals. 360 tablet 0    dapagliflozin (FARXIGA) 10 MG Oral Tab Take 1 tablet (10 mg total) by mouth daily. 90 tablet 1    lisinopril 5 MG Oral Tab TAKE ONE TABLET BY MOUTH ONE TIME DAILY 90 tablet 1    latanoprost 0.005 % Ophthalmic Solution INSTILL 1 DROP INTO BOTH EYES EVERY MORNING 3 each 3    OneTouch UltraSoft Lancets  Does not apply Misc Test 1x daily 100 each 1    Glucose Blood (ONETOUCH ULTRA) In Vitro Strip Test 1x daily 100 each 1    ACCU-CHEK SOFTCLIX LANCETS Does not apply Misc   0    Glucose Blood In Vitro Strip place 1 by Intradermal route  every day

## 2025-07-07 ENCOUNTER — TELEPHONE (OUTPATIENT)
Facility: CLINIC | Age: 65
End: 2025-07-07

## 2025-07-17 ENCOUNTER — LAB ENCOUNTER (OUTPATIENT)
Dept: LAB | Age: 65
End: 2025-07-17
Attending: Nurse Practitioner
Payer: COMMERCIAL

## 2025-07-17 DIAGNOSIS — G24.9 DYSTONIA: ICD-10-CM

## 2025-07-17 DIAGNOSIS — R29.6 FALLS: ICD-10-CM

## 2025-07-17 PROCEDURE — 82390 ASSAY OF CERULOPLASMIN: CPT

## 2025-07-17 PROCEDURE — 36415 COLL VENOUS BLD VENIPUNCTURE: CPT

## 2025-07-18 LAB — CERULOPLASMIN SERPL-MCNC: 20 MG/DL (ref 20–60)

## 2025-07-22 ENCOUNTER — HOSPITAL ENCOUNTER (OUTPATIENT)
Dept: MRI IMAGING | Age: 65
Discharge: HOME | End: 2025-07-22
Attending: FAMILY MEDICINE
Payer: COMMERCIAL

## 2025-07-22 DIAGNOSIS — R41.3 MEMORY LOSS: ICD-10-CM

## 2025-07-22 DIAGNOSIS — R26.9 GAIT ABNORMALITY: ICD-10-CM

## 2025-07-22 PROCEDURE — 70551 MRI BRAIN STEM W/O DYE: CPT | Performed by: FAMILY MEDICINE

## 2025-07-26 DIAGNOSIS — E11.8 CONTROLLED TYPE 2 DIABETES MELLITUS WITH COMPLICATION, WITHOUT LONG-TERM CURRENT USE OF INSULIN (HCC): ICD-10-CM

## 2025-07-28 ENCOUNTER — TELEPHONE (OUTPATIENT)
Dept: ENDOCRINOLOGY CLINIC | Facility: CLINIC | Age: 65
End: 2025-07-28

## 2025-07-28 DIAGNOSIS — E11.8 CONTROLLED TYPE 2 DIABETES MELLITUS WITH COMPLICATION, WITHOUT LONG-TERM CURRENT USE OF INSULIN (HCC): Primary | ICD-10-CM

## 2025-07-28 RX ORDER — LISINOPRIL 5 MG/1
5 TABLET ORAL DAILY
Qty: 90 TABLET | Refills: 0 | Status: SHIPPED | OUTPATIENT
Start: 2025-07-28

## 2025-07-28 NOTE — TELEPHONE ENCOUNTER
Endocrine Refill protocol for oral antihypertensive medications    Protocol Criteria:  PASSED  Reason: N/A     If all below requirements are met, send a 90-day supply with 1 refill per provider protocol.    Verify appointment with Endocrinology completed in the last 6 months or scheduled in the next 3 months.  Verify BMP or CMP completed in the last 12 months   Verify last GFR result is greater than or equal to 50     Last completed office visit:3/26/2025 Sarah Ahuja MD   Last completed telemed visit: Visit date not found  Next scheduled Follow up:   Future Appointments   Date Time Provider Department Center   8/8/2025 12:00 PM Radha Elizabeth [facility] [place]   9/17/2025  1:20 PM Maldonado Monahan DO [facility] [place] [facility]      Last BMP or CMP completion date:  Lab Results   Component Value Date    GFRAA 97 12/03/2021    GFRNAA 84 12/03/2021    EGFRCR 79 06/02/2025

## 2025-07-31 RX ORDER — GLIMEPIRIDE 2 MG/1
2 TABLET ORAL
Qty: 90 TABLET | Refills: 1 | Status: SHIPPED | OUTPATIENT
Start: 2025-07-31

## (undated) DIAGNOSIS — F32.A DEPRESSION, UNSPECIFIED DEPRESSION TYPE: Primary | ICD-10-CM

## (undated) DEVICE — KIT CLEAN ENDOKIT 1.1OZ GOWNX2

## (undated) DEVICE — KIT ENDO ORCAPOD 160/180/190

## (undated) DEVICE — MEDI-VAC NON-CONDUCTIVE SUCTION TUBING 6MM X 1.8M (6FT.) L: Brand: CARDINAL HEALTH

## (undated) DEVICE — LINE MNTR ADLT SET O2 INTMD

## (undated) DEVICE — STERILE LATEX POWDER-FREE SURGICAL GLOVESWITH NITRILE COATING: Brand: PROTEXIS

## (undated) DEVICE — 35 ML SYRINGE REGULAR TIP: Brand: MONOJECT

## (undated) DEVICE — FORCEP RADIAL JAW 4

## (undated) NOTE — LETTER
4/13/2018              [facility] [address] [facility] [address]         To Whom it may concern: This is to certify that More Raymundo had an appointment on 4/13/2018 at 10:14 AM with Annie Trejo DO.   Off from 4/10

## (undated) NOTE — LETTER
3/22/2022              [facility]        [id number] [address]         To Whom it may concern: This is to certify that Cony Hunt had an appointment on 3/22/2022 at 4:30 PM with Baldomero Villeda DO. Off work due to illness. May return to work 3/26/2022 without restriction.        Sincerely,    Baldomero Villeda DO  [place] [address] [facility]  [id number] [place] [address] [address] [address] [phone]        Document electronically generated by:  Baldomero Villeda DO

## (undated) NOTE — LETTER
7/6/2020          To Whom It May Concern:    Baudilio Chun is currently under my medical care and may not return to work until further notice. If you require additional information please contact our office.         Sincerely,    Sienna Me,

## (undated) NOTE — LETTER
04/05/18        [address] [facility] [address] [address]      Dear Vida Epstein,    [facility] records indicate that you have outstanding lab work and or testing that was ordered for you and has not yet been completed:          Hemoglobin A1C [E]

## (undated) NOTE — MR AVS SNAPSHOT
[place]  [address]  [phone]               Thank you for choosing us for your health care visit with Henry Davies.   We are glad to serve you and happy to provide you with this barnes MyChart     Visit [email or web address]  You can access your MyChart to more actively manage your health care and view more details from this visit by going to [email or web address].   If you've recently had a stay at the Hospital you can access your disc

## (undated) NOTE — Clinical Note
5/25/2017          To Whom It May Concern:    More Raymundo is currently under my medical care. Please excuse the patient from work missed as she has been ill. May return to work tomorrow if well.   If you require additional information please contac

## (undated) NOTE — LETTER
12/17/2020          To Whom It May Concern:    David Rios is currently under my medical care and may not return to work at this time. Please excuse Jignesh Wood from work at this time. She may return to work 1/4/2021.      Activity is restricted

## (undated) NOTE — LETTER
AUTHORIZATION FOR SURGICAL OPERATION OR OTHER PROCEDURE    1. I hereby authorize Dr. Remington Hoffmann , and [place] [facility] staff assigned to my case to perform the following operation and/or procedure at the [place] [facility]:    IND on left foot   _______________________________________________________________________________________________      _______________________________________________________________________________________________    2. My physician has explained the nature and purpose of the operation or other procedure, possible alternative methods of treatment, the risks involved, and the possibility of complication to me. I acknowledge that no guarantee has been made as to the result that may be obtained. 3.  I recognize that, during the course of this operation, or other procedure, unforseen conditions may necessitate additional or different procedure than those listed above. I, therefore, further authorize and request that the above named physician, his/her physician assistants or designees perform such procedures as are, in his/her professional opinion, necessary and desirable. 4.  Any tissue or organs removed in the operation or other procedure may be disposed of by and at the discretion of the [facility], [facility] and [facility] AT [facility]. 5.  I understand that in the event of a medical emergency, I will be transported by local paramedics to [facility] or other [facility] emergency department. 6.  I certify that I have read and fully understand the above consent to operation and/or other procedure. 7.  I acknowledge that my physician has explained sedation/analgesia administration to me including the risks and benefits. I consent to the administration of sedation/analgesia as may be necessary or desirable in the judgement of my physician.     Witness signature: ___________________________________________________ Date:  ______/______/_____                    Time:  ________ A.M.  P.M. Patient Name:  ______________________________________________________  (please print)      Patient signature:  ___________________________________________________             Relationship to Patient:           []  Parent    Responsible person                          []  Spouse  In case of minor or                    [] Other  _____________   Incompetent name:  __________________________________________________                               (please print)      _____________      Responsible person  In case of minor or  Incompetent signature:  _______________________________________________    Statement of Physician  My signature below affirms that prior to the time of the procedure, I have explained to the patient and/or his/her guardian, the risks and benefits involved in the proposed treatment and any reasonable alternative to the proposed treatment. I have also explained the risks and benefits involved in the refusal of the proposed treatment and have answered the patient's questions.                         Date:  ______/______/_______  Provider                      Signature:  __________________________________________________________       Time:  ___________ A.M    P.M.

## (undated) NOTE — LETTER
July 15, 2021    Marco Garcia DO  [address] [facility] [address]     Patient: Kae Regan   YOB: 1960   Date of Visit: 7/15/2021       Dear Dr. Sheron Landis DO:    Thank you for referring Amrik Gip to me for evaluatio complication, not stated as uncontrolled     NG:  No retinopathy, OU, 2011   • Type II or unspecified type diabetes mellitus without mention of complication, not stated as uncontrolled     NG: No retinopathy, OU, 2014       Surgical History: Odin Acre (1,000 mg total) by mouth 2 (two) times daily with meals.  360 tablet 1   • Blood Glucose Monitoring Suppl (ONETOUCH ULTRA 2) w/Device Does not apply Kit Test 1x daily 1 kit 0   • OneTouch UltraSoft Lancets Does not apply Misc Test 1x daily 100 each 1   • G BDR    Vessels Normal Normal    Periphery Normal Normal            Refraction     Wearing Rx       Sphere Cylinder Axis Add    Right -2.25 +2.50 010 +2.25    Left -2.00 +3.00 155 +2.25    Type: Progressive bifocal          Manifest Refraction       Sphere Sincerely,        Patria Cruz MD        CC: Contreras Viveros MD    Document electronically generated by: Patria Cruz

## (undated) NOTE — LETTER
12/23/2019              [facility] [address]         Dear Carina Holley,    This letter is to inform you that our office has made several attempts to reach you by phone without success.   We were attempting to c

## (undated) NOTE — LETTER
6/2/2020    [facility] [address]            Dear Neeta Newman,      Our records indicate that you are due for an appointment for a Colonoscopy in July 2020, or shortly there after, with Gia Feliz

## (undated) NOTE — MR AVS SNAPSHOT
[address]  [address]  [phone]               Thank you for choosing us for your health care visit with Shirley Barr DPM.  We are glad to serve you and happy to provide yo TAKE ONE TABLET BY MOUTH THREE TIMES DAILY AS NEEDED FOR DIZZINESS   Commonly known as:  ANTIVERT           MetFORMIN HCl  MG Tb24   Take 2 tablets (1,000 mg total) by mouth 2 (two) times daily with meals.    Commonly known as:  Giovanni Cobos

## (undated) NOTE — LETTER
June 16, 2020    Siobhan Malin DO  [address] [facility] [address]     Patient: Armani Castellanos   YOB: 1960   Date of Visit: 6/16/2020       Dear Dr. Tatianna Garrido DO:    Thank you for referring Priscila Lester to me for evaluation.  Her NG:  No retinopathy, OU, 2011   • Type II or unspecified type diabetes mellitus without mention of complication, not stated as uncontrolled     NG: No retinopathy, OU, 2014       Surgical History: Daphnieyury Talamantes has a past surgical history that include • aspirin 81 MG Oral Tab Take 81 mg by mouth daily. • ACCU-CHEK SOFTCLIX LANCETS Does not apply Misc   0   • Blood Glucose Monitoring Suppl (ACCU-CHEK RADHA PLUS) W/DEVICE Does not apply Kit To use as directed.  1 kit 0   • Glucose Blood (ACCU-CHEK SATYA Patient is happy with her glasses                  ASSESSMENT/PLAN:     Diagnoses and Plan:     Primary open angle glaucoma of both eyes, mild stage  Discussed with patient Intraocular pressure stable, tolerating medications.   Will continue same regimen of

## (undated) NOTE — Clinical Note
Saloni Peter Md  [address] [place] [address]       04/20/2017        Patient: Tiffany Mcknight   YOB: 1960   Date of Visit: 4/20/2017       Dear  Dr. Axel Skiff, MD,      Thank you for referring Tiffany Mcknight to my practi

## (undated) NOTE — LETTER
10/6/2022              [facility]        [address] [facility] [address]         Dear Coby Cook,    Our records indicate that the tests ordered for you by Shazia Serna  have not been done. If you have, in fact, already completed the tests or you do not wish to have the tests done, please contact our office at [phone]. Otherwise, please proceed with the testing. Enclosed is a duplicate order for your convenience. Labs and Imaging order :   Please call Central scheduling at [phone] to schedule this test order. ACTIN (SMOOTH MUSCLE) ANTIBODY   ALPHA-1-ANTITRYPSIN, SERUM   CERULOPLASMIN   FERRITIN   HCV ANTIBODY   HEP A AB, TOTAL   HEP B CORE AB, TOT   HEPATIC FUNCTION PANEL (7)   HEPATITIS B SURFACE ANTIBODY   HEPATITIS B SURFACE ANTIGEN   IMMUNOGLOBULIN A/G/M, QUANT   IRON AND TIBC   MITOCHONDRIAL (M2) ANTIBODY   Less     Imaging   US ABDOMEN COMPLETE (CPT=76700)        Sincerely,    Jessa Still, CATRACHITA Bhatt , [address] [place] [address]  [address]  [address] [facility] [address] [phone]

## (undated) NOTE — LETTER
5/1/2019            [facility] [address]       Dear Michelle Dorado,    The visual field test you took on 5/1/2019 showed an enlarged blind spot in both eyes.   The visual field was unchanged when compared to

## (undated) NOTE — LETTER
March 22, 2018    Ian Thompson DO  [address] [facility]     Patient: Claudia Liao   YOB: 1960   Date of Visit: 3/22/2018       Dear Dr. Sj Rodriguez DO:    Thank you for referring Kala Parminder to me for evaluation.  He • Type II or unspecified type diabetes mellitus without mention of complication, not stated as uncontrolled     NG: No retinopathy, OU, 2014       Surgical History: Lemont Siemens has a past surgical history that includes ; other surgical histo aspirin 81 MG Oral Tab Take 81 mg by mouth daily.  Disp:  Rfl:    Meclizine HCl 25 MG Oral Tab TAKE ONE TABLET BY MOUTH THREE TIMES DAILY AS NEEDED FOR DIZZINESS Disp: 30 tablet Rfl: 0   LISINOPRIL 5 MG Oral Tab TAKE ONE TABLET BY MOUTH ONCE DAILY Disp: 30 Wearing Rx       Sphere Cylinder Axis Add    Right -2.25 +2.75 003 +2.25    Left -2.25 +3.00 155 +2.25    Type:  Progressive bifocal          Manifest Refraction     Patient is happy with her glasses                 ASSESSMENT/PLAN:     Diagnoses and Plan

## (undated) NOTE — LETTER
11/16/2020          To Whom It May Concern:    Marta Antonio is currently under my medical care and may return to work at [employer] 4 hours a day for 4 days a week. No Restrictions during the four hours a day.     If you require additional informa

## (undated) NOTE — LETTER
July 18, 2023      No Recipients     Patient: Rajni Shearer   YOB: 1960   Date of Visit: 7/18/2023       Dear Dr. Olga Padilla Recipients: Thank you for referring Josephine Quezada to me for evaluation. Here is my assessment and plan of care:    Rajni Shearer is a 65year old female. HPI:     HPI    Patient is here for VF, OCT, DM EE and Photos. Patient is on Latanoprost in both eyes in the morning because she was forgetting to put them in at night. Pt denies vision changes.       Pt has been a diabetic for 15 years       Pt's diabetes is currently controlled by pills  Pt checks BS once in a while    Pt's last blood sugar was 121   Last HA1C was 6.6 on 3/13/23  Endocrinologist: Dr. Carlo Joaquin edited by Sha Hamilton O.NGUYEN on 7/18/2023  4:30 PM.        Patient History:  Past Medical History:   Diagnosis Date    Acute iritis 09/26/2008    NG:  OD    After-cataract of left eye with vision obscured 12/22/2015    Cataract 2006    NG: OD    Cataract 2006    NG:  OS    Episcleritis 12/14/2009    NG:  OD    Ganglion 10/22/2013    NG: Right thumb    High blood pressure     Iritis 12/14/2009    NG: OD    Myopia with astigmatism and presbyopia 2008    NG:  OU    Ocular hypertension 2008    NG:  OS    Ophthalmological disorder     NG:  Macula pigmentation changes, OS, 2006    Type II or unspecified type diabetes mellitus without mention of complication, not stated as uncontrolled     NG:  No retinopathy, OU, 2006    Type II or unspecified type diabetes mellitus without mention of complication, not stated as uncontrolled     NG:  No retinopathy, OU, 2009    Type II or unspecified type diabetes mellitus without mention of complication, not stated as uncontrolled     NG:  No retinopathy, OU, 2010    Type II or unspecified type diabetes mellitus without mention of complication, not stated as uncontrolled     NG:  No retinopathy, OU, 2011    Type II or unspecified type diabetes mellitus without mention of complication, not stated as uncontrolled     NG: No retinopathy, OU,        Surgical History: Jazmin Castaneda has a past surgical history that includes ; other surgical history (NG: Bunion/hammer toes surgery ); other surgical history (NG:  Excision ganglion, right thumb, 10/22/2013 - Amrita Hoffman ); Cataract extraction w/  intraocular lens implant (Right, 14) ( Phaco w/ PC IOL/general/vision blue/ RJM); Cataract extraction w/  intraocular lens implant (Left, 14) (Phaco with PC IOL/ general/ Vision Blue/ RJM); Yag Capsulotomy - OS - Left Eye (Left, 16) (RJM); Yag Capsulotomy - OD - Right Eye (Right, 2016) (Dr. Benigno Roberts); and colonoscopy (N/A, 10/26/2022) (Procedure: COLONOSCOPY;  Surgeon: Evie Ghotra MD;  Location: [facility]). Family History   Problem Relation Age of Onset    Cancer Father         NG: Esophageal    Heart Disease Mother         NG:  CAD    Cancer Mother     Diabetes Mother     Psychiatric Paternal Grandmother     Ovarian Cancer Paternal Grandmother     Breast Cancer Cousin     Ovarian Cancer Paternal Cousin Female     Breast Cancer Paternal Cousin Female         pre isidro    Glaucoma Neg     Macular degeneration Neg        Social History:   Social History     Socioeconomic History    Marital status:    Tobacco Use    Smoking status: Never    Smokeless tobacco: Never   Vaping Use    Vaping Use: Never used   Substance and Sexual Activity    Alcohol use: Yes     Comment:   Occasionally     Drug use: No   Other Topics Concern    Caffeine Concern Yes       Medications:  Current Outpatient Medications   Medication Sig Dispense Refill    lisinopril 5 MG Oral Tab Take 1 tablet (5 mg total) by mouth daily. 90 tablet 0    metFORMIN  MG Oral Tablet 24 Hr Take 2 tablets (1,000 mg total) by mouth 2 (two) times daily with meals.  360 tablet 0    GLIMEPIRIDE 4 MG Oral Tab Take 1 tablet by mouth every morning before breakfast 90 tablet 0    dapagliflozin (FARXIGA) 10 MG Oral Tab Take 1 tablet (10 mg total) by mouth daily. 90 tablet 1    latanoprost 0.005 % Ophthalmic Solution INSTILL 1 DROP INTO BOTH EYES EVERY MORNING 3 each 3    sulfamethoxazole-trimethoprim DS (BACTRIM DS) 800-160 MG Oral Tab per tablet Take 1 tablet by mouth 2 (two) times daily.  14 tablet 0    OneTouch UltraSoft Lancets Does not apply Misc Test 1x daily 100 each 1    Glucose Blood (ONETOUCH ULTRA) In Vitro Strip Test 1x daily 100 each 1    ACCU-CHEK SOFTCLIX LANCETS Does not apply Misc   0    Glucose Blood In Vitro Strip place 1 by Intradermal route  every day         Allergies:    Ceftriaxone             RASH    ROS:       PHYSICAL EXAM:     Base Eye Exam       Visual Acuity (Snellen - Linear)         Right Left    Dist cc 20/40 +2 20/20 -1    Near cc 20/25 20/20              Tonometry (Applanation, 4:10 PM)         Right Left    Pressure 18 19              Pachymetry (10/20/2008)         Right Left    Thickness 567/-1 574/-2              Pupils         Pupils    Right PERRL    Left PERRL              Visual Fields         Left Right     Full Full              Extraocular Movement         Right Left     Full, Ortho Full, Ortho              Dilation       Both eyes: 1.0% Mydriacyl and 2.5% Deon Synephrine x 2 @ 4:01 PM                  Slit Lamp and Fundus Exam       Slit Lamp Exam         Right Left    Lids/Lashes Dermatochalasis, Meibomian gland dysfunction Dermatochalasis, Meibomian gland dysfunction, everted SOLO- no FB    Conjunctiva/Sclera 1+ Injection 1+ Injection    Cornea no K spindles, mild MDF no K spindles, mild MDF    Anterior Chamber Deep and quiet Deep and quiet    Iris No transillumination defects No transillumination defects    Lens PC IOL with YAG  PC IOL with YAG    Vitreous Vitreous floaters Vitreous floaters              Fundus Exam         Right Left    Disc Sloping margin, Peripapillary atrophy Good rim, Peripapillary atrophy    C/D Ratio 0.7 0.3    Macula Normal- no BDR RPE hypopigment above fovea- no BDR    Vessels Normal Normal    Periphery Normal Normal                  Refraction       Wearing Rx         Sphere Cylinder Axis Add    Right -2.00 +2.50 010 +2.25    Left -2.00 +3.00 155 +2.25      Type: Progressive bifocal              Manifest Refraction    Patient deferred refraction. She is happy with her current glasses. ASSESSMENT/PLAN:     Diagnoses and Plan:     Primary open angle glaucoma of both eyes, mild stage  Visual field and OCT completed in office today with stable results that were discussed   Photos taken today to document optic nerves. Discussed with patient Intraocular pressure stable, tolerating medications. Will continue same regimen of Latanoprost in both eyes every morning. Stressed importance of taking Latanoprost every morning. Will have patient back in 4 months for a pressure check. Pseudophakia of both eyes  No treatment. Continue same glasses. Vitreous floaters of both eyes  No treatment. There is no evidence of retinal pathology. All signs and symptoms of retinal detachment/tears explained in detail. Patient instructed to call the office if they experience increase in floaters, increase in flashes of light, loss of vision or curtain or veil effect. Diabetes mellitus type 2 without retinopathy ([facility] [address]  Diabetes type II: no background of retinopathy, no signs of neovascularization noted. Discussed ocular and systemic benefits of blood sugar control. Diagnosis and treatment discussed in detail with patient. No orders of the defined types were placed in this encounter. Meds This Visit:  Requested Prescriptions      No prescriptions requested or ordered in this encounter        Follow up instructions:  Return in about 4 months (around 11/18/2023) for IOP check. 7/18/2023  Scribed by: Fran Coombs MD        If you have questions, please do not hesitate to call me.  I look forward to following Uzma Ni along with you.     Sincerely,        Angelique Mosqueda MD        CC:   No Recipients    Document electronically generated by: Angelique Mosqueda MD

## (undated) NOTE — MR AVS SNAPSHOT
[facility] SPECIALTY [facility] - [place] [address] [address] [id number] [phone]               Thank you for choosing us for your health care visit with Lorraine Wiggins MD.  We are glad to serve you and happy to provide you with this summary of y authorization, such as [place], please feel free to schedule your appointment immediately. However, if you are unsure about the requirements for authorization, please wait 5-7 days and then contact your physician's   office.  At that time, you norris [address], [phone]  [address] [facility]     Phone:  [phone]    - Meclizine HCl 25 MG Tabs            MyChart     Visit MyChart  You can access your MyChart to more actively manage your health care an

## (undated) NOTE — LETTER
Date & Time: 4/9/2018, 6:37 PM  Patient: Glendy Godinez  Attending Provider:    Sincerely,    Germaine Carter MD         To Whom It May Concern:    Salvatore Hewitt was seen and treated in our department on 4/9/2018.  She should not return to work until the

## (undated) NOTE — MR AVS SNAPSHOT
[facility] SPECIALTY [facility] - [place] [address] [address] [id number] [phone]               Thank you for choosing us for your health care visit with Jada Pagan MD.  We are glad to serve you and happy to provide you with this summary of y Take 5 mL by mouth every 6 (six) hours as needed for cough. Medication may causes sedation. Not to operate heavy machinery or drive on medication.    Commonly known as:  CHERATUSSIN AC           latanoprost 0.005 % Soln   Instill 1 drop by ophthalmic route return for a follow-up Blood Pressure Check in 1 month.      Lifestyle Modification Recommendations:    Modification Recommendation   Weight Reduction Maintain normal body weight (body mass index 18.5-24.9 kg/m2)   DASH eating plan Adopt a diet rich in frui

## (undated) NOTE — Clinical Note
5/25/2017          To Whom It May Concern:    Lucila Méndez is currently under my medical care. Please excuse the patient from work missed as she has been ill. May return to work on Monday, May 29, 2017.   If you require additional information please

## (undated) NOTE — LETTER
12/17/2020          To Whom It May Concern:    Jailyn Sol is currently under my medical care and may not return to work at this time. Please excuse Jazmyn Salcido from work until 01/04/2021. Activity is restricted as follows: none.     If you

## (undated) NOTE — LETTER
12/05/17        [address] [facility] [id number]      Dear Caresse Hodgkin,    [address] records indicate that you have outstanding lab work and or testing that was ordered for you and has not yet been completed:          Hemoglobin A1C [E]

## (undated) NOTE — MR AVS SNAPSHOT
[address]  [address]  [phone]               Thank you for choosing us for your health care visit with Tom Alarcon DPM.  We are glad to serve you and happy to provide yo Commonly known as:  XALATAN           lisinopril 5 MG Tabs   TAKE ONE TABLET BY MOUTH ONCE DAILY   Commonly known as:  PRINIVIL,ZESTRIL           Meclizine HCl 25 MG Tabs   TAKE ONE TABLET BY MOUTH THREE TIMES DAILY AS NEEDED FOR DIZZINESS   Commonly known

## (undated) NOTE — MR AVS SNAPSHOT
[address] [facility]  [phone]               Thank you for choosing us for your health care visit with Joseph Parker.  Daxa June MD.  We are glad to serve you and happy to provide you with this summar or be assured that none are required. You can then schedule your appointment. Failure to obtain required authorization numbers can create reimbursement difficulties for you.         Referral for Evaluation  Comprehensive Hearing Evaluation and Tympanometry Summaries. If you've been to the Emergency Department or your doctor's office, you can view your past visit information in [employer] by going to Visits < Visit Summaries. [employer] questions? Call [phone] for help.   [employer] is NOT to be used for urge

## (undated) NOTE — MR AVS SNAPSHOT
[place]  [address]  [phone]               Thank you for choosing us for your health care visit with Cynthia Jones MD.  We are glad to serve you and happy to provide you with this summa medications. Will continue same regimen of Latanoprost in both eyes at bedtime. Will have patient back in 4 months for a visual field, optic nerve scan and pressure check.                  Allergies as of Apr 08, 2017     No Known Allergies